# Patient Record
Sex: MALE | Race: WHITE | NOT HISPANIC OR LATINO | Employment: OTHER | URBAN - METROPOLITAN AREA
[De-identification: names, ages, dates, MRNs, and addresses within clinical notes are randomized per-mention and may not be internally consistent; named-entity substitution may affect disease eponyms.]

---

## 2021-02-15 ENCOUNTER — APPOINTMENT (OUTPATIENT)
Dept: PREADMISSION TESTING | Facility: HOSPITAL | Age: 76
End: 2021-02-15

## 2021-03-30 ENCOUNTER — OFFICE VISIT (OUTPATIENT)
Dept: CARDIOLOGY | Facility: CLINIC | Age: 76
End: 2021-03-30

## 2021-03-30 ENCOUNTER — TELEPHONE (OUTPATIENT)
Dept: CARDIOLOGY | Facility: CLINIC | Age: 76
End: 2021-03-30

## 2021-03-30 VITALS
SYSTOLIC BLOOD PRESSURE: 136 MMHG | BODY MASS INDEX: 31.35 KG/M2 | HEIGHT: 70 IN | WEIGHT: 219 LBS | HEART RATE: 69 BPM | RESPIRATION RATE: 12 BRPM | OXYGEN SATURATION: 98 % | TEMPERATURE: 97.8 F | DIASTOLIC BLOOD PRESSURE: 78 MMHG

## 2021-03-30 DIAGNOSIS — Z79.4 TYPE 2 DIABETES MELLITUS WITHOUT COMPLICATION, WITH LONG-TERM CURRENT USE OF INSULIN (HCC): ICD-10-CM

## 2021-03-30 DIAGNOSIS — E11.9 TYPE 2 DIABETES MELLITUS WITHOUT COMPLICATION, WITH LONG-TERM CURRENT USE OF INSULIN (HCC): ICD-10-CM

## 2021-03-30 DIAGNOSIS — I42.0 CARDIOMYOPATHY, DILATED (HCC): Primary | ICD-10-CM

## 2021-03-30 DIAGNOSIS — E78.5 HYPERLIPIDEMIA LDL GOAL <70: ICD-10-CM

## 2021-03-30 DIAGNOSIS — I10 ESSENTIAL HYPERTENSION: Primary | ICD-10-CM

## 2021-03-30 PROCEDURE — 99204 OFFICE O/P NEW MOD 45 MIN: CPT | Performed by: INTERNAL MEDICINE

## 2021-03-30 PROCEDURE — 93000 ELECTROCARDIOGRAM COMPLETE: CPT | Performed by: INTERNAL MEDICINE

## 2021-03-30 RX ORDER — SACUBITRIL AND VALSARTAN 97; 103 MG/1; MG/1
1 TABLET, FILM COATED ORAL 2 TIMES DAILY
Qty: 180 TABLET | Refills: 3 | Status: SHIPPED | OUTPATIENT
Start: 2021-03-30 | End: 2021-04-01 | Stop reason: SDUPTHER

## 2021-03-30 RX ORDER — HYDRALAZINE HYDROCHLORIDE 25 MG/1
25 TABLET, FILM COATED ORAL 2 TIMES DAILY
Qty: 180 TABLET | Refills: 3 | Status: SHIPPED | OUTPATIENT
Start: 2021-03-30

## 2021-03-30 RX ORDER — SACUBITRIL AND VALSARTAN 97; 103 MG/1; MG/1
1 TABLET, FILM COATED ORAL 2 TIMES DAILY
Qty: 180 TABLET | Refills: 3 | Status: SHIPPED | OUTPATIENT
Start: 2021-03-30 | End: 2021-03-30 | Stop reason: SDUPTHER

## 2021-03-30 RX ORDER — HYDRALAZINE HYDROCHLORIDE 25 MG/1
25 TABLET, FILM COATED ORAL 2 TIMES DAILY
Qty: 14 TABLET | Refills: 0 | Status: SHIPPED | OUTPATIENT
Start: 2021-03-30 | End: 2021-08-02

## 2021-03-30 NOTE — PROGRESS NOTES
[facility]  [facility]ARDIOLOGY  [address] [place] [address]  Dept: [phone]  Dept Fax: [phone]    Joe Fung  1945    New Patient Office Note    History of Present Illness:  Joe Fung is a 76 y.o. male who presents to the clinic for new patient. For cardiomyopathy- Male 76 years old who has been seen by Dr West but his insurance will not allow him to see him anymore so he came over to see us, He has heart failure and apparently cardiomyopathy, he was in [place] in 2019 and was treated with diuretics, Entresto, Toprol , and Torsemide, he is doing better, , no edema, he takes torsemide PRN< feels tired and fatigue BP is 150.80, has SOB on exertion, walking over 2 blocks, . EKG sinus rhythm with HR 63, , will increase Entresto to 97.103 bid, , keep all the others meds, we might need to add Aldactone     The following portions of the patient's history were reviewed and updated as appropriate: allergies, current medications, past family history, past medical history, past social history, past surgical history and problem list.    Medications:  Alpha-Lipoic Acid tablet  cetirizine  Cinnamon  coenzyme Q10  DULoxetine  Entresto tablet  fexofenadine  finasteride  gabapentin  insulin glargine  melatonin  meloxicam  metFORMIN  metoprolol succinate XL  montelukast  multivitamin with minerals tablet  torsemide  traZODone  vitamin B-12  Vitamin D3 capsule  vitamin E    Subjective  Allergies   Allergen Reactions   • Ace Inhibitors Cough        Past Medical History:   Diagnosis Date   • Adenomatous polyp of colon    • Bilateral exudative age-related macular degeneration (CMS/HCC)    • BMI 33.0-33.9,adult    • BPH associated with nocturia    • Bursitis of left elbow    • Chronic fatigue    • Chronic systolic heart failure (CMS/HCC)    • CKD (chronic kidney disease)    • Deficiency anemia    • Diabetic retinopathy (CMS/HCC)    • Diastolic dysfunction    • Dilated  cardiomyopathy (CMS/HCC)    • Elevated lipids    • Frequent falls    • Insomnia, unspecified    • Long term (current) use of insulin (CMS/HCC)    • Mild episode of recurrent major depressive disorder (CMS/HCC)    • Mixed hyperlipidemia    • Nocturia    • Other long term (current) drug therapy    • Perennial allergic rhinitis    • Peripheral polyneuropathy    • Primary osteoarthritis involving multiple joints    • Rotator cuff tear    • Skin cancer    • Small vessel coronary artery disease    • Type 2 diabetes mellitus with diabetic neuropathy, with long-term current use of insulin (CMS/HCC)    • Vitamin D insufficiency        Past Surgical History:   Procedure Laterality Date   • CATARACT EXTRACTION     • ELBOW OLECRANNON BURSA EXCISION     • KNEE ARTHROSCOPY Bilateral    • ROTATOR CUFF REPAIR         Family History   Problem Relation Age of Onset   • Stroke Mother    • Diabetes Mother    • Cancer Mother    • Diabetes Maternal Grandmother    • Heart disease Maternal Grandmother         Social History     Socioeconomic History   • Marital status:      Spouse name: Not on file   • Number of children: Not on file   • Years of education: Not on file   • Highest education level: Not on file   Tobacco Use   • Smoking status: Never Smoker   • Smokeless tobacco: Never Used   Substance and Sexual Activity   • Drug use: Never   • Sexual activity: Defer       Review of Systems   Constitutional: Negative.    HENT: Negative.    Respiratory: Negative.    Cardiovascular: Negative.    Endocrine: Negative.    Genitourinary: Negative.    Musculoskeletal: Negative.    Skin: Negative.    Allergic/Immunologic: Negative.    Neurological: Negative.    Hematological: Negative.    Psychiatric/Behavioral: Negative.        Cardiovascular Procedures    ECHO/MUGA:   STRESS TESTS:   CARDIAC CATH:   DEVICES:   HOLTER:   CT/MRI:   VASCULAR:   CARDIOTHORACIC:     Objective  Vitals:    03/30/21 1421   BP: 136/78   BP Location: Left arm  "  Patient Position: Sitting   Cuff Size: Adult   Pulse: 69   Resp: 12   Temp: 97.8 °F (36.6 °C)   TempSrc: Infrared   SpO2: 98%   Weight: 99.3 kg (219 lb)   Height: 177.8 cm (70\")   PainSc: 0-No pain       Physical Exam  Cardiovascular:      PMI at left midclavicular line. Normal rate. Regular rhythm. Normal S1. Normal S2.      Murmurs: There is no murmur.      No gallop. No click. No rub.   Pulses:     Intact distal pulses.   Edema:     Peripheral edema absent.          Diagnostic Data    ECG 12 Lead    Date/Time: 3/30/2021 3:12 PM  Performed by: Cade North MD  Authorized by: Cade North MD   Comparison: compared with previous ECG   Rhythm: sinus rhythm  Rate: normal  BPM: 63  QRS axis: left    Clinical impression: normal ECG            Assessment and Plan  Diagnoses and all orders for this visit:    Cardiomyopathy, dilated (CMS/HCC)-He seems to have non ischemic cardiomyopathy- no data. Has had cardiac cath at [place], will increase Entresto 97.103 bid, keep Toprol xl 50 mg     Hyperlipidemia LDL goal <70- on Atorvastatin 20 mg    Type 2 diabetes mellitus without complication, with long-term current use of insulin (CMS/HCC)    Other orders  -     sacubitril-valsartan (Entresto)  MG tablet; Take 1 tablet by mouth 2 (Two) Times a Day.        Return in about 4 months (around 7/30/2021) for Recheck.    Cade North MD  03/30/2021  "

## 2021-04-01 DIAGNOSIS — I42.0 CARDIOMYOPATHY, DILATED (HCC): ICD-10-CM

## 2021-04-01 RX ORDER — SACUBITRIL AND VALSARTAN 97; 103 MG/1; MG/1
1 TABLET, FILM COATED ORAL 2 TIMES DAILY
Qty: 180 TABLET | Refills: 3 | Status: SHIPPED | OUTPATIENT
Start: 2021-04-01 | End: 2021-05-21

## 2021-04-01 NOTE — TELEPHONE ENCOUNTER
WIFE CALLING BACK HE'S ON 2 BLOOD PRESSURE MEDS AND THEY ADDED hydrALAZINE (APRESOLINE) 25 MG tablet    HER QUESTION IS WHY IS HE TAKING 2 BLOOD PRESSURE MEDICATIONS    [phone]

## 2021-04-01 NOTE — TELEPHONE ENCOUNTER
Patient said [employer] to get his Entresto, Dr North needs to get a form from [email or web address] and send the completed form and a new prescription

## 2021-04-07 ENCOUNTER — TELEPHONE (OUTPATIENT)
Dept: CARDIOLOGY | Facility: CLINIC | Age: 76
End: 2021-04-07

## 2021-04-15 ENCOUNTER — TELEPHONE (OUTPATIENT)
Dept: CARDIOLOGY | Facility: CLINIC | Age: 76
End: 2021-04-15

## 2021-04-27 ENCOUNTER — TELEPHONE (OUTPATIENT)
Dept: CARDIOLOGY | Facility: CLINIC | Age: 76
End: 2021-04-27

## 2021-04-27 NOTE — TELEPHONE ENCOUNTER
Pt needs to fill out application and needs last taxes or last social security notice. Pt needs Pic id and issuance (a copy of it ) left message for pt

## 2021-04-27 NOTE — TELEPHONE ENCOUNTER
ENTRESTO TOO EXPENSIVE,,,,,,,, [employer] IS SUPPOSE TO BE HELPING BUT NEEDS INFO SENT OVER  FAX:  [phone]

## 2021-04-29 ENCOUNTER — TELEPHONE (OUTPATIENT)
Dept: CARDIOLOGY | Facility: CLINIC | Age: 76
End: 2021-04-29

## 2021-05-12 ENCOUNTER — TELEPHONE (OUTPATIENT)
Dept: CARDIOLOGY | Facility: CLINIC | Age: 76
End: 2021-05-12

## 2021-05-12 NOTE — TELEPHONE ENCOUNTER
Information not right need her to fill out right form and bring in tax  Social security form  information  Insurance pic id and she need to fill out right form . Left messge for pt

## 2021-05-12 NOTE — TELEPHONE ENCOUNTER
CALLING TO MAKE SURE YOU HAVE FAXED OVER MED REQUEST TO [facility] FOR ENTRESTO

## 2021-05-13 DIAGNOSIS — I42.0 CARDIOMYOPATHY, DILATED (HCC): ICD-10-CM

## 2021-05-13 RX ORDER — SACUBITRIL AND VALSARTAN 97; 103 MG/1; MG/1
1 TABLET, FILM COATED ORAL 2 TIMES DAILY
Qty: 180 TABLET | Refills: 3 | Status: CANCELLED | OUTPATIENT
Start: 2021-05-13

## 2021-05-17 ENCOUNTER — TELEPHONE (OUTPATIENT)
Dept: CARDIOLOGY | Facility: CLINIC | Age: 76
End: 2021-05-17

## 2021-05-21 DIAGNOSIS — I42.0 CARDIOMYOPATHY, DILATED (HCC): ICD-10-CM

## 2021-05-21 RX ORDER — SACUBITRIL AND VALSARTAN 97; 103 MG/1; MG/1
1 TABLET, FILM COATED ORAL 2 TIMES DAILY
Qty: 180 TABLET | Refills: 3 | Status: SHIPPED | OUTPATIENT
Start: 2021-05-21 | End: 2022-10-24

## 2021-06-02 ENCOUNTER — TELEPHONE (OUTPATIENT)
Dept: CARDIOLOGY | Facility: CLINIC | Age: 76
End: 2021-06-02

## 2021-06-02 NOTE — TELEPHONE ENCOUNTER
Pt was told that he needs to keep the same medication and to keep not  chairs that swille . Pt did fall from that . Pt asked if they could finish his 49-51 I said yes take two .

## 2021-06-02 NOTE — TELEPHONE ENCOUNTER
WIFE CALLING ASKING IF HE CAN TAKE A LOWER DOES IN MORNING AND HIGHER ONE AT NIGHT??    PLEASE CALL HER    [phone]      sacubitril-valsartan (Entresto)  MG tablet

## 2021-08-02 ENCOUNTER — OFFICE VISIT (OUTPATIENT)
Dept: CARDIOLOGY | Facility: CLINIC | Age: 76
End: 2021-08-02

## 2021-08-02 VITALS
TEMPERATURE: 99 F | SYSTOLIC BLOOD PRESSURE: 128 MMHG | OXYGEN SATURATION: 96 % | BODY MASS INDEX: 32.64 KG/M2 | DIASTOLIC BLOOD PRESSURE: 72 MMHG | HEIGHT: 70 IN | WEIGHT: 228 LBS | RESPIRATION RATE: 12 BRPM | HEART RATE: 86 BPM

## 2021-08-02 DIAGNOSIS — Z79.4 TYPE 2 DIABETES MELLITUS WITHOUT COMPLICATION, WITH LONG-TERM CURRENT USE OF INSULIN (HCC): ICD-10-CM

## 2021-08-02 DIAGNOSIS — I42.0 CARDIOMYOPATHY, DILATED (HCC): Primary | ICD-10-CM

## 2021-08-02 DIAGNOSIS — E11.9 TYPE 2 DIABETES MELLITUS WITHOUT COMPLICATION, WITH LONG-TERM CURRENT USE OF INSULIN (HCC): ICD-10-CM

## 2021-08-02 DIAGNOSIS — E78.5 HYPERLIPIDEMIA LDL GOAL <70: ICD-10-CM

## 2021-08-02 PROCEDURE — 99214 OFFICE O/P EST MOD 30 MIN: CPT | Performed by: INTERNAL MEDICINE

## 2021-08-02 NOTE — PROGRESS NOTES
[facility]  [facility] CARDIOLOGY  [address] [place] [address]  Dept: [phone]  Dept Fax: [phone]    Joe Fung  1945    Follow Up Office Visit Note    History of Present Illness:  Joe Fung is a 76 y.o. male who presents to the clinic for Follow-up. Heart failure- we do not have the records from [place] but last echo his Ef is normal ? He does not takes the water pill because makes him to go to the bathroom , he claims to have Cough and SOB specially at night time, advised to take the water pill, on Entresto 49.51 bid and Toprol xl 50 mg    The following portions of the patient's history were reviewed and updated as appropriate: allergies, current medications, past family history, past medical history, past social history, past surgical history and problem list.    Medications:  Alpha-Lipoic Acid tablet  cetirizine  Cinnamon  coenzyme Q10  DULoxetine  Entresto tablet  fexofenadine  finasteride  gabapentin  hydrALAZINE  insulin glargine  melatonin  meloxicam  metFORMIN  metoprolol succinate XL  montelukast  multivitamin with minerals tablet  torsemide  traZODone  vitamin B-12  Vitamin D3 capsule  vitamin E    Subjective  Allergies   Allergen Reactions   • Ace Inhibitors Cough        Past Medical History:   Diagnosis Date   • Adenomatous polyp of colon    • Bilateral exudative age-related macular degeneration (CMS/HCC)    • BMI 33.0-33.9,adult    • BPH associated with nocturia    • Bursitis of left elbow    • Chronic fatigue    • Chronic systolic heart failure (CMS/HCC)    • CKD (chronic kidney disease)    • Deficiency anemia    • Diabetic retinopathy (CMS/HCC)    • Diastolic dysfunction    • Dilated cardiomyopathy (CMS/HCC)    • Elevated lipids    • Frequent falls    • Insomnia, unspecified    • Long term (current) use of insulin (CMS/HCC)    • Mild episode of recurrent major depressive disorder (CMS/HCC)    • Mixed hyperlipidemia    • Nocturia    •  Other long term (current) drug therapy    • Perennial allergic rhinitis    • Peripheral polyneuropathy    • Primary osteoarthritis involving multiple joints    • Rotator cuff tear    • Skin cancer    • Small vessel coronary artery disease    • Type 2 diabetes mellitus with diabetic neuropathy, with long-term current use of insulin (CMS/[facility])    • Vitamin D insufficiency        Past Surgical History:   Procedure Laterality Date   • CATARACT EXTRACTION     • ELBOW OLECRANNON BURSA EXCISION     • KNEE ARTHROSCOPY Bilateral    • ROTATOR CUFF REPAIR         Family History   Problem Relation Age of Onset   • Stroke Mother    • Diabetes Mother    • Cancer Mother    • Diabetes Maternal Grandmother    • Heart disease Maternal Grandmother         Social History     Socioeconomic History   • Marital status:      Spouse name: Not on file   • Number of children: Not on file   • Years of education: Not on file   • Highest education level: Not on file   Tobacco Use   • Smoking status: Never Smoker   • Smokeless tobacco: Never Used   Vaping Use   • Vaping Use: Never used   Substance and Sexual Activity   • Alcohol use: Yes   • Drug use: Never   • Sexual activity: Defer       Review of Systems   Constitutional: Negative.    HENT: Negative.    Respiratory: Positive for shortness of breath.    Cardiovascular: Negative.    Endocrine: Negative.    Genitourinary: Negative.    Musculoskeletal: Negative.    Skin: Negative.    Allergic/Immunologic: Negative.    Neurological: Negative.    Hematological: Negative.    Psychiatric/Behavioral: Negative.    All other systems reviewed and are negative.      Cardiovascular Procedures    ECHO/MUGA:   STRESS TESTS:   CARDIAC CATH:   DEVICES:   HOLTER:   CT/MRI:   VASCULAR:   CARDIOTHORACIC:     Objective  Vitals:    08/02/21 1532   BP: 128/72   BP Location: Left arm   Patient Position: Sitting   Cuff Size: Adult   Pulse: 86   Resp: 12   Temp: 99 °F (37.2 °C)   TempSrc: Infrared   SpO2: 96%  "  Weight: 103 kg (228 lb)   Height: 177.8 cm (70\")   PainSc: 0-No pain     Body mass index is 32.71 kg/m².     Physical Exam  Vitals reviewed.   Constitutional:       Appearance: Healthy appearance. Not in distress.   Eyes:      Pupils: Pupils are equal, round, and reactive to light.   HENT:    Mouth/Throat:      Pharynx: Oropharynx is clear.   Neck:      Thyroid: Thyroid normal.      Vascular: No JVR. JVD normal.   Pulmonary:      Effort: Pulmonary effort is normal.      Breath sounds: Normal breath sounds. No wheezing. No rhonchi. No rales.   Chest:      Chest wall: Not tender to palpatation.   Cardiovascular:      PMI at left midclavicular line. Normal rate. Regular rhythm. Normal S1. Normal S2.      Murmurs: There is no murmur.      No gallop. No click. No rub.   Pulses:     Intact distal pulses.   Edema:     Thigh: bilateral 2+ edema of the thigh.     Pretibial: bilateral 2+ edema of the pretibial area.     Ankle: bilateral 2+ edema of the ankle.     Feet: bilateral 2+ edema of the feet.  Abdominal:      General: Bowel sounds are normal.      Palpations: Abdomen is soft.      Tenderness: There is no abdominal tenderness.   Musculoskeletal: Normal range of motion.         General: No tenderness.      Cervical back: Normal range of motion and neck supple. Skin:     General: Skin is warm and dry.   Neurological:      General: No focal deficit present.      Mental Status: Alert and oriented to person, place and time.          Diagnostic Data  Procedures    Assessment and Plan  Diagnoses and all orders for this visit:    Cardiomyopathy, dilated (CMS/HCC)-apparently was low in [place] no records, last echo normal, on Entresto and Toprol plus torsemide 10 mg, advised to take the water pill daily, he has some cough and SOB when he lye down at the recliner     Hyperlipidemia LDL goal <70- on Lipitor 20 mg    Type 2 diabetes mellitus without complication, with long-term current use of insulin (CMS/HCC)         Return " in about 6 months (around 2/2/2022) for Recheck.    Cade North MD  08/02/2021

## 2022-03-30 ENCOUNTER — TELEPHONE (OUTPATIENT)
Dept: FAMILY MEDICINE CLINIC | Facility: CLINIC | Age: 77
End: 2022-03-30

## 2022-04-07 ENCOUNTER — TELEPHONE (OUTPATIENT)
Dept: FAMILY MEDICINE CLINIC | Facility: CLINIC | Age: 77
End: 2022-04-07

## 2022-04-07 NOTE — TELEPHONE ENCOUNTER
Referrral to Hematology/oncology, hematologists are experts in persistent anemia, so we need to refer him to one. See where they would prefer to go..[facility]

## 2022-04-07 NOTE — TELEPHONE ENCOUNTER
Patients wife called, stated that Dr Irene referred patient to Gastro, and all the testing came back normal. Yet his counts are still low and patient is always tired. They were asking on what their next steps would be. Please advise.

## 2022-04-08 DIAGNOSIS — D64.9 ANEMIA, UNSPECIFIED TYPE: Primary | ICD-10-CM

## 2022-04-21 ENCOUNTER — TELEPHONE (OUTPATIENT)
Dept: FAMILY MEDICINE CLINIC | Facility: CLINIC | Age: 77
End: 2022-04-21

## 2022-05-17 ENCOUNTER — TELEPHONE (OUTPATIENT)
Dept: FAMILY MEDICINE CLINIC | Facility: CLINIC | Age: 77
End: 2022-05-17

## 2022-05-27 RX ORDER — METOPROLOL SUCCINATE 50 MG/1
TABLET, EXTENDED RELEASE ORAL
Qty: 90 TABLET | Refills: 0 | Status: SHIPPED | OUTPATIENT
Start: 2022-05-27 | End: 2022-06-23

## 2022-05-27 RX ORDER — TORSEMIDE 10 MG/1
TABLET ORAL
Qty: 90 TABLET | Refills: 0 | Status: SHIPPED | OUTPATIENT
Start: 2022-05-27 | End: 2022-05-31

## 2022-05-27 RX ORDER — VENLAFAXINE HYDROCHLORIDE 37.5 MG/1
CAPSULE, EXTENDED RELEASE ORAL
Qty: 90 CAPSULE | Refills: 0 | Status: SHIPPED | OUTPATIENT
Start: 2022-05-27 | End: 2022-08-18 | Stop reason: SDUPTHER

## 2022-05-31 ENCOUNTER — CONSULT (OUTPATIENT)
Dept: ONCOLOGY | Facility: CLINIC | Age: 77
End: 2022-05-31

## 2022-05-31 VITALS
TEMPERATURE: 98.2 F | RESPIRATION RATE: 18 BRPM | HEART RATE: 80 BPM | BODY MASS INDEX: 28.77 KG/M2 | OXYGEN SATURATION: 98 % | HEIGHT: 70 IN | SYSTOLIC BLOOD PRESSURE: 135 MMHG | WEIGHT: 201 LBS | DIASTOLIC BLOOD PRESSURE: 64 MMHG

## 2022-05-31 DIAGNOSIS — D64.9 NORMOCHROMIC NORMOCYTIC ANEMIA: Chronic | ICD-10-CM

## 2022-05-31 DIAGNOSIS — D64.9 NORMOCHROMIC NORMOCYTIC ANEMIA: Primary | Chronic | ICD-10-CM

## 2022-05-31 PROCEDURE — 99204 OFFICE O/P NEW MOD 45 MIN: CPT | Performed by: INTERNAL MEDICINE

## 2022-05-31 RX ORDER — DAPAGLIFLOZIN 5 MG/1
TABLET, FILM COATED ORAL
COMMUNITY
End: 2022-12-05 | Stop reason: SDUPTHER

## 2022-05-31 RX ORDER — BUMETANIDE 2 MG/1
TABLET ORAL
COMMUNITY

## 2022-05-31 RX ORDER — PANTOPRAZOLE SODIUM 40 MG/1
TABLET, DELAYED RELEASE ORAL
COMMUNITY
End: 2022-11-29

## 2022-05-31 RX ORDER — ATORVASTATIN CALCIUM 20 MG/1
TABLET, FILM COATED ORAL
COMMUNITY
Start: 2022-05-17 | End: 2022-10-24

## 2022-05-31 NOTE — PROGRESS NOTES
"CHIEF COMPLAINT: Fatigue disproportional to anemia    REASON FOR REFERRAL: Same      RECORDS OBTAINED  Records of the patients history including those obtained from primary care were reviewed and summarized in detail.    Oncology/Hematology History Overview Note   1.  Normochromic normocytic anemia  2.  History of adenoma  3.  Diastolic dysfunction   4.  Diabetes  5.  Hyperlipidemia  6.  Polyneuropathy  7.  Macular degeneration  8.  Chronic kidney disease  9.  Diarrhea felt to be due to Bumex  10.  Osteoarthritis    Hematology history timeline:  -2019 colonoscopy with polyps.  Next 1 due 2024.  -November 2021 hemoglobin 12.4 with normal ferritin, iron, B12, folate Per primary care note.  -2/10/2022 iron 82 ferritin 67.5.  Folic acid greater than 22.5.  Hemoglobin 11.9 with MCV 97 and normal white count platelet count and differential.      -5/31/2022 [facility] initial consultation: Here because of his \"anemia\".  He is barely anemic with normochromic normocytic indices which would make iron deficiency or folate deficiency or B12 deficiency unlikely and none of the serologies suggest this.  His wife and he are both convinced he has had an EGD and a colonoscopy done within the last year with Dr. Jerez and I will try to get those results but in the meantime before putting him through an M panel, hemolytic work-up, megaloblastic work-up, and certainly before do anything more aggressive like a bone marrow biopsy to look for myelodysplasia, I will simply repeat his blood work now and again in 4 to 5 months to see what the trend is.  If it is normalizing or stable he is not sure how much work-up he did want to do.  The odds of myelodysplasia or some more sinister marrow disorder with normochromic normocytic indices is unlikely.  With a combination of mild chronic kidney disease and polyneuropathy, if his anemia worsens we will also check the M panel for possible monoclonal gammopathy as a hint towards plasma " cell dyscrasia.           HISTORY OF PRESENT ILLNESS:  The patient is a 77 y.o.  male, referred for fatigue without jaundice or icterus or change in the color caliber or consistency of stools.  Patient's wife states that he has had EGD and colonoscopy with Dr. Jerez done within the last year and he recalls that as well though I do not have those reports.  Note from Dr. Irene states that that was in 2019.    REVIEW OF SYSTEMS:  No jaundice or icterus.  No rash.  No rachel synovitis.  Chronic peripheral polyneuropathy.    Past Medical History:   Diagnosis Date   • Adenomatous polyp of colon    • Bilateral exudative age-related macular degeneration (HCC)    • BMI 33.0-33.9,adult    • BPH associated with nocturia    • Bursitis of left elbow    • Chronic fatigue    • Chronic systolic heart failure (HCC)    • CKD (chronic kidney disease)    • Diabetic retinopathy (HCC)    • Diastolic dysfunction    • Dilated cardiomyopathy (HCC)    • Elevated lipids    • Frequent falls    • Insomnia, unspecified    • Long term (current) use of insulin (HCC)    • Mild episode of recurrent major depressive disorder (HCC)    • Mixed hyperlipidemia    • Nocturia    • Other long term (current) drug therapy    • Perennial allergic rhinitis    • Peripheral polyneuropathy    • Primary osteoarthritis involving multiple joints    • Rotator cuff tear    • Skin cancer    • Small vessel coronary artery disease    • Type 2 diabetes mellitus with diabetic neuropathy, with long-term current use of insulin (HCC)    • Vitamin D insufficiency      Past Surgical History:   Procedure Laterality Date   • CATARACT EXTRACTION     • ELBOW OLECRANNON BURSA EXCISION     • KNEE ARTHROSCOPY Bilateral    • ROTATOR CUFF REPAIR         Current Outpatient Medications on File Prior to Visit   Medication Sig Dispense Refill   • Alpha-Lipoic Acid 600 MG tablet Take  by mouth.     • atorvastatin (LIPITOR) 20 MG tablet      • bumetanide (BUMEX) 2 MG tablet bumetanide 2 mg  tablet   Take 1 tablet every day by oral route in the morning.     • cetirizine (zyrTEC) 10 MG tablet Take 10 mg by mouth Daily.     • Cholecalciferol (Vitamin D3) 25 MCG (1000 UT) capsule Take  by mouth.     • Cinnamon 500 MG capsule Take  by mouth.     • coenzyme Q10 100 MG capsule Take  by mouth.     • dapagliflozin (Farxiga) 5 MG tablet tablet Farxiga 5 mg tablet   Take 1 tablet every day by oral route.     • fexofenadine (ALLEGRA) 180 MG tablet Take 180 mg by mouth Daily.     • finasteride (PROSCAR) 5 MG tablet Take 5 mg by mouth Daily.     • gabapentin (NEURONTIN) 100 MG capsule Take 100 mg by mouth 2 (Two) Times a Day As Needed.     • hydrALAZINE (APRESOLINE) 25 MG tablet Take 1 tablet by mouth 2 (two) times a day. 180 tablet 3   • insulin glargine (LANTUS, SEMGLEE) 100 UNIT/ML injection Inject 40 Units under the skin into the appropriate area as directed 2 (Two) Times a Day. QAM QPM     • melatonin 1 MG tablet Take 1 mg by mouth.     • meloxicam (MOBIC) 7.5 MG tablet Take  by mouth Daily As Needed. 1-2 TABLETS WITH FOOD     • metFORMIN (GLUCOPHAGE) 500 MG tablet Take 500 mg by mouth 2 (Two) Times a Day With Meals.     • metoprolol succinate XL (TOPROL-XL) 50 MG 24 hr tablet TAKE 1 TABLET DAILY 90 tablet 0   • Mirabegron ER (Myrbetriq) 50 MG tablet sustained-release 24 hour 24 hr tablet Myrbetriq 50 mg tablet,extended release   Take 1 tablet every day by oral route in the morning.     • montelukast (SINGULAIR) 10 MG tablet Take 10 mg by mouth Every Night.     • multivitamin with minerals (Multivitamin Adults) tablet tablet Take 1 tablet by mouth Daily. WITH FOOD     • pantoprazole (PROTONIX) 40 MG EC tablet pantoprazole 40 mg tablet,delayed release   Take 1 tablet every day by oral route.     • sacubitril-valsartan (Entresto)  MG tablet Take 1 tablet by mouth 2 (Two) Times a Day. 180 tablet 3   • traZODone (DESYREL) 50 MG tablet Take  by mouth At Night As Needed for Sleep. 1/2 - 1 TABLET     •  "venlafaxine XR (EFFEXOR-XR) 37.5 MG 24 hr capsule TAKE 1 CAPSULE DAILY WITH  FOOD FOR DEPRESSION AND    CHRONIC BACK/JOINT PAIN. 90 capsule 0   • vitamin B-12 (CYANOCOBALAMIN) 1000 MCG tablet Take 1,000 mcg by mouth Daily.     • vitamin E 100 UNIT capsule Take  by mouth.     • [DISCONTINUED] torsemide (DEMADEX) 10 MG tablet TAKE 1 TABLET DAILY AS     NEEDED 90 tablet 0     No current facility-administered medications on file prior to visit.       Allergies   Allergen Reactions   • Ace Inhibitors Cough       Social History     Socioeconomic History   • Marital status:    Tobacco Use   • Smoking status: Never Smoker   • Smokeless tobacco: Never Used   Vaping Use   • Vaping Use: Never used   Substance and Sexual Activity   • Alcohol use: Yes   • Drug use: Never   • Sexual activity: Defer       Family History   Problem Relation Age of Onset   • Stroke Mother    • Diabetes Mother    • Cancer Mother    • Diabetes Maternal Grandmother    • Heart disease Maternal Grandmother        PHYSICAL EXAM:  No jaundice or icterus.  No respiratory distress.  No rashes.    /64   Pulse 80   Temp 98.2 °F (36.8 °C)   Resp 18   Ht 177.8 cm (70\")   Wt 91.2 kg (201 lb)   SpO2 98%   BMI 28.84 kg/m²     ECOG score: 2           ECOG: (2) Ambulatory & Capable of Self Care, Unable to Carry Out Work Activity, Up & About Greater Than 50% of Waking Hours    No results found for: HGB, HCT, MCV, PLT, WBC, NEUTROABS, LYMPHSABS, MONOSABS, EOSABS, BASOSABS  No results found for: GLUCOSE, BUN, CREATININE, NA, K, CL, CO2, CALCIUM, PROTEINTOT, ALBUMIN, BILITOT, ALKPHOS, AST, ALT      Assessment & Plan     1.  Normochromic normocytic anemia  2.  History of adenoma  3.  Diastolic dysfunction   4.  Diabetes  5.  Hyperlipidemia  6.  Polyneuropathy  7.  Macular degeneration  8.  Chronic kidney disease  9.  Diarrhea felt to be due to Bumex  10.  Osteoarthritis    Hematology history timeline:  -2019 colonoscopy with polyps.  Next 1 due " "2024.  -November 2021 hemoglobin 12.4 with normal ferritin, iron, B12, folate Per primary care note.  -2/10/2022 iron 82 ferritin 67.5.  Folic acid greater than 22.5.  Hemoglobin 11.9 with MCV 97 and normal white count platelet count and differential.    -5/31/2022 [facility] initial consultation: Here because of his \"anemia\".  He is barely anemic with normochromic normocytic indices which would make iron deficiency or folate deficiency or B12 deficiency unlikely and none of the serologies suggest this.  His wife and he are both convinced he has had an EGD and a colonoscopy done within the last year with Dr. Jerez and I will try to get those results but in the meantime before putting him through an M panel, hemolytic work-up, megaloblastic work-up, and certainly before do anything more aggressive like a bone marrow biopsy to look for myelodysplasia, I will simply repeat his blood work now and again in 4 to 5 months to see what the trend is.  If it is normalizing or stable he is not sure how much work-up he did want to do.  The odds of myelodysplasia or some more sinister marrow disorder with normochromic normocytic indices is unlikely.  With a combination of mild chronic kidney disease and polyneuropathy, if his anemia worsens we will also check the M panel for possible monoclonal gammopathy as a hint towards plasma cell dyscrasia.    Total time of care today inclusive of time spent today prior to his visit collecting records and cataloging results of those records as outlined above from primary care and during visit translating that information and investigating other potential signs or symptoms relative to potential causes of anemia during his visit and after visit instituting the plan as outlined above took 45 minutes of total patient care time throughout the day today.    Afshin Das MD    5/31/2022  "

## 2022-06-01 LAB
BASOPHILS # BLD AUTO: 0.1 X10E3/UL (ref 0–0.2)
BASOPHILS NFR BLD AUTO: 1 %
EOSINOPHIL # BLD AUTO: 0.5 X10E3/UL (ref 0–0.4)
EOSINOPHIL NFR BLD AUTO: 5 %
ERYTHROCYTE [DISTWIDTH] IN BLOOD BY AUTOMATED COUNT: 14.6 % (ref 11.6–15.4)
HCT VFR BLD AUTO: 34 % (ref 37.5–51)
HGB BLD-MCNC: 11.5 G/DL (ref 13–17.7)
IMM GRANULOCYTES # BLD AUTO: 0.1 X10E3/UL (ref 0–0.1)
IMM GRANULOCYTES NFR BLD AUTO: 1 %
LYMPHOCYTES # BLD AUTO: 3.9 X10E3/UL (ref 0.7–3.1)
LYMPHOCYTES NFR BLD AUTO: 43 %
MCH RBC QN AUTO: 32.6 PG (ref 26.6–33)
MCHC RBC AUTO-ENTMCNC: 33.8 G/DL (ref 31.5–35.7)
MCV RBC AUTO: 96 FL (ref 79–97)
MONOCYTES # BLD AUTO: 0.5 X10E3/UL (ref 0.1–0.9)
MONOCYTES NFR BLD AUTO: 6 %
NEUTROPHILS # BLD AUTO: 4.1 X10E3/UL (ref 1.4–7)
NEUTROPHILS NFR BLD AUTO: 44 %
PLATELET # BLD AUTO: 232 X10E3/UL (ref 150–450)
RBC # BLD AUTO: 3.53 X10E6/UL (ref 4.14–5.8)
WBC # BLD AUTO: 9.2 X10E3/UL (ref 3.4–10.8)

## 2022-06-07 ENCOUNTER — TELEPHONE (OUTPATIENT)
Dept: ONCOLOGY | Facility: CLINIC | Age: 77
End: 2022-06-07

## 2022-06-07 NOTE — TELEPHONE ENCOUNTER
Called patients wife and left message for her to return call. Patients Hgb and MCV on CBC from 5/31/22 are stable.

## 2022-06-07 NOTE — TELEPHONE ENCOUNTER
Caller: KYREE RAMIREZ    Relationship: Emergency Contact    Best call back number: [phone]    What test was performed: LABS    When was the test performed: 05/31    Where was the test performed:

## 2022-06-23 ENCOUNTER — OFFICE VISIT (OUTPATIENT)
Dept: FAMILY MEDICINE CLINIC | Facility: CLINIC | Age: 77
End: 2022-06-23

## 2022-06-23 VITALS
DIASTOLIC BLOOD PRESSURE: 76 MMHG | BODY MASS INDEX: 30.35 KG/M2 | HEIGHT: 70 IN | SYSTOLIC BLOOD PRESSURE: 118 MMHG | OXYGEN SATURATION: 95 % | HEART RATE: 88 BPM | WEIGHT: 212 LBS

## 2022-06-23 DIAGNOSIS — E78.2 MIXED HYPERLIPIDEMIA: ICD-10-CM

## 2022-06-23 DIAGNOSIS — M19.042 PRIMARY OSTEOARTHRITIS OF BOTH HANDS: ICD-10-CM

## 2022-06-23 DIAGNOSIS — I42.0 CARDIOMYOPATHY, DILATED: ICD-10-CM

## 2022-06-23 DIAGNOSIS — N32.81 OVERACTIVE BLADDER: ICD-10-CM

## 2022-06-23 DIAGNOSIS — E78.5 HYPERLIPIDEMIA LDL GOAL <70: ICD-10-CM

## 2022-06-23 DIAGNOSIS — E11.621 DIABETIC ULCER OF TOE OF RIGHT FOOT ASSOCIATED WITH TYPE 2 DIABETES MELLITUS, LIMITED TO BREAKDOWN OF SKIN: ICD-10-CM

## 2022-06-23 DIAGNOSIS — E11.22 TYPE 2 DIABETES MELLITUS WITH STAGE 3B CHRONIC KIDNEY DISEASE, WITH LONG-TERM CURRENT USE OF INSULIN: Primary | ICD-10-CM

## 2022-06-23 DIAGNOSIS — M19.041 PRIMARY OSTEOARTHRITIS OF BOTH HANDS: ICD-10-CM

## 2022-06-23 DIAGNOSIS — E11.42 TYPE 2 DIABETES MELLITUS WITH PERIPHERAL NEUROPATHY: ICD-10-CM

## 2022-06-23 DIAGNOSIS — Z79.4 TYPE 2 DIABETES MELLITUS WITH STAGE 3B CHRONIC KIDNEY DISEASE, WITH LONG-TERM CURRENT USE OF INSULIN: Primary | ICD-10-CM

## 2022-06-23 DIAGNOSIS — N39.41 URGE INCONTINENCE OF URINE: ICD-10-CM

## 2022-06-23 DIAGNOSIS — R53.83 FATIGUE, UNSPECIFIED TYPE: ICD-10-CM

## 2022-06-23 DIAGNOSIS — D64.9 NORMOCHROMIC NORMOCYTIC ANEMIA: Chronic | ICD-10-CM

## 2022-06-23 DIAGNOSIS — N40.1 LOWER URINARY TRACT SYMPTOMS DUE TO BENIGN PROSTATIC HYPERPLASIA: ICD-10-CM

## 2022-06-23 DIAGNOSIS — R35.1 NOCTURIA: ICD-10-CM

## 2022-06-23 DIAGNOSIS — I50.22 CHRONIC SYSTOLIC HEART FAILURE: ICD-10-CM

## 2022-06-23 DIAGNOSIS — Z79.899 ENCOUNTER FOR LONG-TERM (CURRENT) USE OF OTHER MEDICATIONS: ICD-10-CM

## 2022-06-23 DIAGNOSIS — D53.9 DEFICIENCY ANEMIA: ICD-10-CM

## 2022-06-23 DIAGNOSIS — N18.32 TYPE 2 DIABETES MELLITUS WITH STAGE 3B CHRONIC KIDNEY DISEASE, WITH LONG-TERM CURRENT USE OF INSULIN: Primary | ICD-10-CM

## 2022-06-23 DIAGNOSIS — L97.511 DIABETIC ULCER OF TOE OF RIGHT FOOT ASSOCIATED WITH TYPE 2 DIABETES MELLITUS, LIMITED TO BREAKDOWN OF SKIN: ICD-10-CM

## 2022-06-23 PROBLEM — I50.20 SYSTOLIC HEART FAILURE: Status: ACTIVE | Noted: 2018-03-01

## 2022-06-23 PROCEDURE — 1160F RVW MEDS BY RX/DR IN RCRD: CPT | Performed by: FAMILY MEDICINE

## 2022-06-23 PROCEDURE — G0438 PPPS, INITIAL VISIT: HCPCS | Performed by: FAMILY MEDICINE

## 2022-06-23 PROCEDURE — 36415 COLL VENOUS BLD VENIPUNCTURE: CPT | Performed by: FAMILY MEDICINE

## 2022-06-23 PROCEDURE — 1170F FXNL STATUS ASSESSED: CPT | Performed by: FAMILY MEDICINE

## 2022-06-23 RX ORDER — ASPIRIN 81 MG/1
81 TABLET, CHEWABLE ORAL DAILY
COMMUNITY

## 2022-06-24 ENCOUNTER — TELEPHONE (OUTPATIENT)
Dept: FAMILY MEDICINE CLINIC | Facility: CLINIC | Age: 77
End: 2022-06-24

## 2022-06-24 NOTE — TELEPHONE ENCOUNTER
I SENT THE REFERRAL FOR HIM TO [facility].  THEY HAVE CONTACTED HIM AND HE TOLD THEM HE WILL ONLY COME TO AN APPOINTMENT THAT IS 4:00PM OR AFTER.  THEY DO NOT SCHEDULE THIS LATE.  [facility] STATED HE SAID HIS WIFE CAN'T GET OFF WORK TO TAKE HIM.   THEY TOLD THE PATIENT TO JUST CALL THEM BACK IF HE CHANGED HIS MIND.

## 2022-06-25 LAB
ALBUMIN SERPL-MCNC: 4.3 G/DL (ref 3.7–4.7)
ALBUMIN/GLOB SERPL: 1.9 {RATIO} (ref 1.2–2.2)
ALP SERPL-CCNC: 101 IU/L (ref 44–121)
ALT SERPL-CCNC: 19 IU/L (ref 0–44)
AST SERPL-CCNC: 29 IU/L (ref 0–40)
BASOPHILS # BLD AUTO: 0.1 X10E3/UL (ref 0–0.2)
BASOPHILS NFR BLD AUTO: 1 %
BILIRUB SERPL-MCNC: 1.4 MG/DL (ref 0–1.2)
BUN SERPL-MCNC: 35 MG/DL (ref 8–27)
BUN/CREAT SERPL: 30 (ref 10–24)
CALCIUM SERPL-MCNC: 9 MG/DL (ref 8.6–10.2)
CHLORIDE SERPL-SCNC: 102 MMOL/L (ref 96–106)
CHOLEST SERPL-MCNC: 100 MG/DL (ref 100–199)
CO2 SERPL-SCNC: 26 MMOL/L (ref 20–29)
CREAT SERPL-MCNC: 1.15 MG/DL (ref 0.76–1.27)
EGFRCR SERPLBLD CKD-EPI 2021: 66 ML/MIN/1.73
EOSINOPHIL # BLD AUTO: 0.3 X10E3/UL (ref 0–0.4)
EOSINOPHIL NFR BLD AUTO: 3 %
ERYTHROCYTE [DISTWIDTH] IN BLOOD BY AUTOMATED COUNT: 13.6 % (ref 11.6–15.4)
FERRITIN SERPL-MCNC: 87 NG/ML (ref 30–400)
FOLATE SERPL-MCNC: >20 NG/ML
GLOBULIN SER CALC-MCNC: 2.3 G/DL (ref 1.5–4.5)
GLUCOSE SERPL-MCNC: 113 MG/DL (ref 65–99)
HBA1C MFR BLD: 5.3 % (ref 4.8–5.6)
HCT VFR BLD AUTO: 38.1 % (ref 37.5–51)
HDLC SERPL-MCNC: 34 MG/DL
HGB BLD-MCNC: 13 G/DL (ref 13–17.7)
IMM GRANULOCYTES # BLD AUTO: 0 X10E3/UL (ref 0–0.1)
IMM GRANULOCYTES NFR BLD AUTO: 0 %
IRON SERPL-MCNC: 73 UG/DL (ref 38–169)
LDLC SERPL CALC-MCNC: 44 MG/DL (ref 0–99)
LYMPHOCYTES # BLD AUTO: 4.3 X10E3/UL (ref 0.7–3.1)
LYMPHOCYTES NFR BLD AUTO: 43 %
MCH RBC QN AUTO: 33.2 PG (ref 26.6–33)
MCHC RBC AUTO-ENTMCNC: 34.1 G/DL (ref 31.5–35.7)
MCV RBC AUTO: 97 FL (ref 79–97)
MONOCYTES # BLD AUTO: 0.6 X10E3/UL (ref 0.1–0.9)
MONOCYTES NFR BLD AUTO: 6 %
NEUTROPHILS # BLD AUTO: 4.7 X10E3/UL (ref 1.4–7)
NEUTROPHILS NFR BLD AUTO: 47 %
PLATELET # BLD AUTO: 201 X10E3/UL (ref 150–450)
POTASSIUM SERPL-SCNC: 4.7 MMOL/L (ref 3.5–5.2)
PROT SERPL-MCNC: 6.6 G/DL (ref 6–8.5)
RBC # BLD AUTO: 3.92 X10E6/UL (ref 4.14–5.8)
SODIUM SERPL-SCNC: 141 MMOL/L (ref 134–144)
TRIGL SERPL-MCNC: 119 MG/DL (ref 0–149)
TSH SERPL DL<=0.005 MIU/L-ACNC: 2.5 UIU/ML (ref 0.45–4.5)
VIT B12 SERPL-MCNC: >2000 PG/ML (ref 232–1245)
VLDLC SERPL CALC-MCNC: 22 MG/DL (ref 5–40)
WBC # BLD AUTO: 10 X10E3/UL (ref 3.4–10.8)

## 2022-06-26 ENCOUNTER — TELEPHONE (OUTPATIENT)
Dept: FAMILY MEDICINE CLINIC | Facility: CLINIC | Age: 77
End: 2022-06-26

## 2022-06-26 NOTE — PROGRESS NOTES
The ABCs of the Annual Wellness Visit  Initial Medicare Wellness Visit    Chief Complaint   Patient presents with   • Annual Exam     Medicare wellness check up      Subjective   History of Present Illness:  Joe Fung is a 77 y.o. male who presents for an Initial Medicare Wellness Visit.    The following portions of the patient's history were reviewed and   updated as appropriate: .     Compared to one year ago, the patient feels his physical   health is better.    Compared to one year ago, the patient feels his mental   health is better.    Recent Hospitalizations:  He was not admitted to the hospital during the last year.       Current Medical Providers:  Patient Care Team:  Vimal Irene MD as PCP - General (Family Medicine)  Cade North MD as Consulting Physician (Cardiology)    Outpatient Medications Prior to Visit   Medication Sig Dispense Refill   • Alpha-Lipoic Acid 600 MG tablet Take  by mouth.     • aspirin 81 MG chewable tablet Chew 81 mg Daily.     • atorvastatin (LIPITOR) 20 MG tablet      • bumetanide (BUMEX) 2 MG tablet bumetanide 2 mg tablet   Take 1 tablet every day by oral route in the morning.     • cetirizine (zyrTEC) 10 MG tablet Take 10 mg by mouth Daily.     • Cholecalciferol (Vitamin D3) 25 MCG (1000 UT) capsule Take  by mouth.     • Cinnamon 500 MG capsule Take  by mouth.     • coenzyme Q10 100 MG capsule Take  by mouth.     • dapagliflozin (Farxiga) 5 MG tablet tablet Farxiga 5 mg tablet   Take 1 tablet every day by oral route.     • finasteride (PROSCAR) 5 MG tablet Take 5 mg by mouth Daily.     • gabapentin (NEURONTIN) 100 MG capsule Take 100 mg by mouth 2 (Two) Times a Day As Needed.     • hydrALAZINE (APRESOLINE) 25 MG tablet Take 1 tablet by mouth 2 (two) times a day. 180 tablet 3   • insulin glargine (LANTUS, SEMGLEE) 100 UNIT/ML injection Inject 40 Units under the skin into the appropriate area as directed 2 (Two) Times a Day. QAM QPM     • meloxicam (MOBIC) 7.5 MG  tablet Take  by mouth Daily As Needed. 1-2 TABLETS WITH FOOD     • metFORMIN (GLUCOPHAGE) 500 MG tablet Take 500 mg by mouth 2 (Two) Times a Day With Meals.     • Mirabegron ER (MYRBETRIQ) 50 MG tablet sustained-release 24 hour 24 hr tablet Myrbetriq 50 mg tablet,extended release   Take 1 tablet every day by oral route in the morning.     • montelukast (SINGULAIR) 10 MG tablet Take 10 mg by mouth Every Night.     • multivitamin with minerals tablet tablet Take 1 tablet by mouth Daily. WITH FOOD     • pantoprazole (PROTONIX) 40 MG EC tablet pantoprazole 40 mg tablet,delayed release   Take 1 tablet every day by oral route.     • sacubitril-valsartan (Entresto)  MG tablet Take 1 tablet by mouth 2 (Two) Times a Day. 180 tablet 3   • venlafaxine XR (EFFEXOR-XR) 37.5 MG 24 hr capsule TAKE 1 CAPSULE DAILY WITH  FOOD FOR DEPRESSION AND    CHRONIC BACK/JOINT PAIN. 90 capsule 0   • vitamin B-12 (CYANOCOBALAMIN) 1000 MCG tablet Take 1,000 mcg by mouth Daily.     • vitamin E 100 UNIT capsule Take  by mouth.     • traZODone (DESYREL) 50 MG tablet Take  by mouth At Night As Needed for Sleep. 1/2 - 1 TABLET     • fexofenadine (ALLEGRA) 180 MG tablet Take 180 mg by mouth Daily.     • melatonin 1 MG tablet Take 1 mg by mouth.     • metoprolol succinate XL (TOPROL-XL) 50 MG 24 hr tablet TAKE 1 TABLET DAILY 90 tablet 0     No facility-administered medications prior to visit.       No opioid medication identified on active medication list. I have reviewed chart for other potential  high risk medication/s and harmful drug interactions in the elderly.          Aspirin is on active medication list.       Patient Active Problem List   Diagnosis   • Cardiomyopathy, dilated (HCC)   • Hyperlipidemia LDL goal <70   • Type 2 diabetes mellitus with peripheral neuropathy (HCC)   • Normochromic normocytic anemia   • Hyperlipidemia   • Urge incontinence of urine   • Nocturia   • Lower urinary tract symptoms due to benign prostatic hyperplasia  "  • Systolic heart failure (HCC)   • Encounter for long-term (current) use of other medications   • Deficiency anemia   • Overactive bladder   • Primary osteoarthritis of both hands   • Diabetic ulcer of toe of right foot associated with type 2 diabetes mellitus, limited to breakdown of skin (HCC)     Advance Care Planning  Advance Directive is on file.  ACP discussion was declined by the patient. Patient has an advance directive in EMR which is still valid.           Objective       Vitals:    06/23/22 1457   BP: 118/76   BP Location: Left arm   Patient Position: Sitting   Cuff Size: Large Adult   Pulse: 88   SpO2: 95%   Weight: 96.2 kg (212 lb)   Height: 177.8 cm (70\")     Estimated body mass index is 30.42 kg/m² as calculated from the following:    Height as of this encounter: 177.8 cm (70\").    Weight as of this encounter: 96.2 kg (212 lb).    BMI is >= 30 and <35. (Class 1 Obesity). The following options were offered after discussion;: exercise counseling/recommendations and nutrition counseling/recommendations      Does the patient have evidence of cognitive impairment? No    Physical Exam  Lab Results   Component Value Date    CHLPL 100 06/23/2022    TRIG 119 06/23/2022    HDL 34 (L) 06/23/2022    LDL 44 06/23/2022    VLDL 22 06/23/2022    HGBA1C 5.3 06/23/2022          HEALTH RISK ASSESSMENT    Smoking Status:  Social History     Tobacco Use   Smoking Status Never Smoker   Smokeless Tobacco Never Used     Alcohol Consumption:  Social History     Substance and Sexual Activity   Alcohol Use Yes     Fall Risk Screen:    JOSEADI Fall Risk Assessment was completed, and patient is at HIGH risk for falls. Assessment completed on:6/23/2022    Depression Screen:   PHQ-2/PHQ-9 Depression Screening 6/23/2022   Little Interest or Pleasure in Doing Things 0-->not at all   Feeling Down, Depressed or Hopeless 3-->nearly every day   Trouble Falling or Staying Asleep, or Sleeping Too Much 3-->nearly every day   Feeling Tired " or Having Little Energy 3-->nearly every day   Poor Appetite or Overeating 3-->nearly every day   Feeling Bad about Yourself - or that You are a Failure or Have Let Yourself or Your Family Down 3-->nearly every day   Trouble Concentrating on Things, Such as Reading the Newspaper or Watching Television 0-->not at all   Moving or Speaking So Slowly that Other People Could Have Noticed? Or the Opposite - Being So Fidgety 0-->not at all   Thoughts that You Would be Better Off Dead or of Hurting Yourself in Some Way 1-->several days   PHQ-9: Brief Depression Severity Measure Score 16   If You Checked Off Any Problems, How Difficult Have These Problems Made It For You to Do Your Work, Take Care of Things at Home, or Get Along with Other People? somewhat difficult       Health Habits and Functional and Cognitive Screening:  Functional & Cognitive Status 6/23/2022   Do you have difficulty preparing food and eating? No   Do you have difficulty bathing yourself, getting dressed or grooming yourself? No   Do you have difficulty using the toilet? No   Do you have difficulty moving around from place to place? Yes   Do you have trouble with steps or getting out of a bed or a chair? Yes   Current Diet Well Balanced Diet   Dental Exam Up to date   Eye Exam Up to date   Exercise (times per week) 0 times per week   Current Exercises Include Walking   Do you need help using the phone?  No   Are you deaf or do you have serious difficulty hearing?  Yes   Do you need help with transportation? Yes   Do you need help shopping? No   Do you need help preparing meals?  No   Do you need help with housework?  No   Do you need help with laundry? No   Do you need help taking your medications? Yes   Do you need help managing money? No   Do you ever drive or ride in a car without wearing a seat belt? No   Have you felt unusual stress, anger or loneliness in the last month? Yes   Who do you live with? Spouse   If you need help, do you have trouble  finding someone available to you? No   Have you been bothered in the last four weeks by sexual problems? No   Do you have difficulty concentrating, remembering or making decisions? No       Age-appropriate Screening Schedule:  Refer to the list below for future screening recommendations based on patient's age, sex and/or medical conditions. Orders for these recommended tests are listed in the plan section. The patient has been provided with a written plan.    Health Maintenance   Topic Date Due   • URINE MICROALBUMIN  Never done   • TDAP/TD VACCINES (1 - Tdap) Never done   • ZOSTER VACCINE (1 of 2) Never done   • DIABETIC FOOT EXAM  Never done   • INFLUENZA VACCINE  10/01/2022   • HEMOGLOBIN A1C  12/23/2022   • DIABETIC EYE EXAM  04/29/2023   • LIPID PANEL  06/23/2023            Assessment & Plan   CMS Preventative Services Quick Reference  Risk Factors Identified During Encounter  Cardiovascular Disease  Chronic Pain   Depression/Dysphoria  Fall Risk-High or Moderate  Inadequate Social Support, Isolation, Loneliness, Lack of Transportation, Financial Difficulties, or Caregiver Stress   Inactivity/Sedentary  Obesity/Overweight   Urinary Incontinence  The above risks/problems have been discussed with the patient.  Follow up actions/plans if indicated are seen below in the Assessment/Plan Section.  Pertinent information has been shared with the patient in the After Visit Summary.    Diagnoses and all orders for this visit:    1. Type 2 diabetes mellitus with stage 3b chronic kidney disease, with long-term current use of insulin (HCC) (Primary)  -     Hemoglobin A1c; Future  -     Hemoglobin A1c    2. Type 2 diabetes mellitus with peripheral neuropathy (HCC)    3. Encounter for long-term (current) use of other medications  -     CBC Auto Differential; Future  -     Comprehensive Metabolic Panel; Future  -     CBC Auto Differential  -     Comprehensive Metabolic Panel    4. Mixed hyperlipidemia  -     Lipid Panel;  Future  -     Lipid Panel    5. Cardiomyopathy, dilated (HCC)    6. Hyperlipidemia LDL goal <70    7. Normochromic normocytic anemia    8. Urge incontinence of urine    9. Nocturia    10. Lower urinary tract symptoms due to benign prostatic hyperplasia    11. Chronic systolic heart failure (HCC)    12. Deficiency anemia  -     Ferritin; Future  -     Iron; Future  -     Vitamin B12; Future  -     Folate; Future  -     Ferritin  -     Iron  -     Vitamin B12  -     Folate    13. Overactive bladder    14. Fatigue, unspecified type  -     TSH; Future  -     TSH    15. Primary osteoarthritis of both hands  -     Ambulatory Referral to Orthopedic Surgery    16. Diabetic ulcer of toe of right foot associated with type 2 diabetes mellitus, limited to breakdown of skin (HCC)  -     Ambulatory Referral to Wound Clinic        Follow Up:  No follow-ups on file.     An After Visit Summary and PPPS were made available to the patient.        Patient did say the arthritis in his hands is worse and wanted to be referred to an orthopedic hand specialist.  We are trying to increase his gabapentin to 1-3 100 mg pills at at bedtime to help with better sleep, as he still has not been sleeping well.  The dose can be titrated further if needed.  He was once again advised to use meloxicam very sparingly if at all and the hazards are risk were discussed.  Labs were drawn and meds refilled

## 2022-06-26 NOTE — TELEPHONE ENCOUNTER
Computer is saying they are incomplete variables or smart list that need to be completed before I can close the note.  Do you know what this is?  Please let me know

## 2022-06-27 ENCOUNTER — TELEPHONE (OUTPATIENT)
Dept: FAMILY MEDICINE CLINIC | Facility: CLINIC | Age: 77
End: 2022-06-27

## 2022-06-27 NOTE — TELEPHONE ENCOUNTER
Not sure if you can help me but I did an initial wellness exam on this patient last week, and the computer is not letting me close at the note because it said that incomplete variables or smart sets are pending on this patient that I need to fill out.  Betsey is not here so I thought I would ask you if you can help me figure this 1 out.  Thanks

## 2022-07-19 ENCOUNTER — OFFICE VISIT (OUTPATIENT)
Dept: FAMILY MEDICINE CLINIC | Facility: CLINIC | Age: 77
End: 2022-07-19

## 2022-07-19 VITALS
RESPIRATION RATE: 18 BRPM | DIASTOLIC BLOOD PRESSURE: 70 MMHG | HEART RATE: 85 BPM | SYSTOLIC BLOOD PRESSURE: 122 MMHG | BODY MASS INDEX: 30.43 KG/M2 | WEIGHT: 212.6 LBS | HEIGHT: 70 IN | OXYGEN SATURATION: 98 %

## 2022-07-19 DIAGNOSIS — E11.42 TYPE 2 DIABETES MELLITUS WITH PERIPHERAL NEUROPATHY: ICD-10-CM

## 2022-07-19 DIAGNOSIS — Z79.899 ENCOUNTER FOR LONG-TERM (CURRENT) USE OF OTHER MEDICATIONS: ICD-10-CM

## 2022-07-19 DIAGNOSIS — I42.0 CARDIOMYOPATHY, DILATED: ICD-10-CM

## 2022-07-19 DIAGNOSIS — I50.22 CHRONIC SYSTOLIC HEART FAILURE: ICD-10-CM

## 2022-07-19 DIAGNOSIS — K21.9 GASTROESOPHAGEAL REFLUX DISEASE WITHOUT ESOPHAGITIS: ICD-10-CM

## 2022-07-19 DIAGNOSIS — I51.89 DIASTOLIC DYSFUNCTION: ICD-10-CM

## 2022-07-19 DIAGNOSIS — F51.01 PRIMARY INSOMNIA: Primary | ICD-10-CM

## 2022-07-19 PROCEDURE — 99214 OFFICE O/P EST MOD 30 MIN: CPT | Performed by: FAMILY MEDICINE

## 2022-07-19 RX ORDER — ESZOPICLONE 1 MG/1
TABLET, FILM COATED ORAL
Qty: 30 TABLET | Refills: 5 | Status: SHIPPED | OUTPATIENT
Start: 2022-07-19 | End: 2022-12-16

## 2022-07-20 PROBLEM — I51.89 DIASTOLIC DYSFUNCTION: Status: ACTIVE | Noted: 2022-07-20

## 2022-07-20 PROBLEM — K21.9 GASTROESOPHAGEAL REFLUX DISEASE WITHOUT ESOPHAGITIS: Status: ACTIVE | Noted: 2022-07-20

## 2022-07-20 NOTE — PROGRESS NOTES
"Chief Complaint  ER follow up    Subjective      Joe Fung presents to [facility]  History of Present Illness  Patient woke up at 5 AM recently with severe pain in the upper chest.  His wife drove him to the emergency room, and cardiac work-up was unremarkable.  He said he was given a GI cocktail which instantly relieved his symptoms.  He says he has had acid reflux for many years but never had symptoms like this before.  There has been no dysphagia or odynophagia, and he did not eat anything out of the ordinary.  No blood in stool or melena.  He has not had any recurrence of symptoms.  When he has had cardiac symptoms in the past, they have been different from this as well.  He has followed up with his cardiologist as well.  We discussed differential diagnosis.    Patient says he still is having insomnia problems.  When he tries to take a little bit more gabapentin at bedtime, he stays drowsy a lot of the day.  Therefore this is not a suitable option.  Nonpharmacologic measures have not been of benefit to him.  He denies any depression or anxiety symptoms currently.    Objective   Vital Signs:   Vitals:    07/19/22 1535   BP: 122/70   Pulse: 85   Resp: 18   SpO2: 98%   Weight: 96.4 kg (212 lb 9.6 oz)   Height: 177.8 cm (70\")      /70   Pulse 85   Resp 18   Ht 177.8 cm (70\")   Wt 96.4 kg (212 lb 9.6 oz)   SpO2 98%   BMI 30.50 kg/m²     Body mass index is 30.5 kg/m².    Review of Systems   Constitutional: Negative for activity change, appetite change, chills, diaphoresis and fever.   HENT: Negative for nosebleeds, sore throat and voice change.    Respiratory: Negative for choking, chest tightness, shortness of breath and wheezing.    Cardiovascular: Negative for chest pain, palpitations and leg swelling.   Gastrointestinal: Negative for abdominal distention, abdominal pain, anal bleeding, blood in stool, constipation, diarrhea, nausea, vomiting, GERD and indigestion. "   Endocrine: Negative for polydipsia and polyphagia.   Genitourinary: Negative for dysuria, frequency, hematuria and urgency.   Musculoskeletal: Negative for back pain, joint swelling, myalgias and neck pain.   Neurological: Positive for numbness. Negative for tremors, seizures, syncope, facial asymmetry, speech difficulty, headache, memory problem and confusion.   Hematological: Negative for adenopathy.       Past History:  Medical History: has a past medical history of Adenomatous polyp of colon, Bilateral exudative age-related macular degeneration (HCC), BMI 33.0-33.9,adult, BPH associated with nocturia, Bursitis of left elbow, Chronic fatigue, Chronic systolic heart failure ([facility]), CKD (chronic kidney disease), Diabetic retinopathy ([facility]), Diastolic dysfunction, Dilated cardiomyopathy ([facility]), Elevated lipids, Frequent falls, Insomnia, unspecified, Long term (current) use of insulin ([facility]), Mild episode of recurrent major depressive disorder ([facility]), Mixed hyperlipidemia, Nocturia, Other long term (current) drug therapy, Perennial allergic rhinitis, Peripheral polyneuropathy, Primary osteoarthritis involving multiple joints, Rotator cuff tear, Skin cancer, Small vessel coronary artery disease, Type 2 diabetes mellitus with diabetic neuropathy, with long-term current use of insulin ([facility]), and Vitamin D insufficiency.   Surgical History: has a past surgical history that includes Knee arthroscopy (Bilateral); Rotator cuff repair; Elbow bursa surgery; and Cataract extraction.   Family History: family history includes Cancer in his mother; Diabetes in his maternal grandmother and mother; Heart disease in his maternal grandmother; Stroke in his mother.   Social History: reports that he has never smoked. He has never used smokeless tobacco. He reports current alcohol use. He reports that he does not use drugs.      Current Outpatient Medications:   •  Alpha-Lipoic Acid 600 MG tablet, Take  by mouth., Disp: , Rfl:   •  aspirin  81 MG chewable tablet, Chew 81 mg Daily., Disp: , Rfl:   •  atorvastatin (LIPITOR) 20 MG tablet, , Disp: , Rfl:   •  bumetanide (BUMEX) 2 MG tablet, bumetanide 2 mg tablet  Take 1 tablet every day by oral route in the morning., Disp: , Rfl:   •  cetirizine (zyrTEC) 10 MG tablet, Take 10 mg by mouth Daily., Disp: , Rfl:   •  Cholecalciferol (Vitamin D3) 25 MCG (1000 UT) capsule, Take  by mouth., Disp: , Rfl:   •  Cinnamon 500 MG capsule, Take  by mouth., Disp: , Rfl:   •  coenzyme Q10 100 MG capsule, Take  by mouth., Disp: , Rfl:   •  Continuous Glucose Monitor Sup kit, 1 kit Every 14 (Fourteen) Days. Apply one sensor every 14 days as instructed, Disp: 6 kit, Rfl: 3  •  dapagliflozin (Farxiga) 5 MG tablet tablet, Farxiga 5 mg tablet  Take 1 tablet every day by oral route., Disp: , Rfl:   •  finasteride (PROSCAR) 5 MG tablet, Take 5 mg by mouth Daily., Disp: , Rfl:   •  gabapentin (NEURONTIN) 100 MG capsule, Take 100 mg by mouth 2 (Two) Times a Day As Needed., Disp: , Rfl:   •  hydrALAZINE (APRESOLINE) 25 MG tablet, Take 1 tablet by mouth 2 (two) times a day., Disp: 180 tablet, Rfl: 3  •  insulin glargine (LANTUS, SEMGLEE) 100 UNIT/ML injection, Inject 40 Units under the skin into the appropriate area as directed 2 (Two) Times a Day. QAM QPM, Disp: , Rfl:   •  meloxicam (MOBIC) 7.5 MG tablet, Take  by mouth Daily As Needed. 1-2 TABLETS WITH FOOD, Disp: , Rfl:   •  metFORMIN (GLUCOPHAGE) 500 MG tablet, Take 500 mg by mouth 2 (Two) Times a Day With Meals., Disp: , Rfl:   •  Mirabegron ER (MYRBETRIQ) 50 MG tablet sustained-release 24 hour 24 hr tablet, Myrbetriq 50 mg tablet,extended release  Take 1 tablet every day by oral route in the morning., Disp: , Rfl:   •  montelukast (SINGULAIR) 10 MG tablet, Take 10 mg by mouth Every Night., Disp: , Rfl:   •  multivitamin with minerals tablet tablet, Take 1 tablet by mouth Daily. WITH FOOD, Disp: , Rfl:   •  pantoprazole (PROTONIX) 40 MG EC tablet, pantoprazole 40 mg  tablet,delayed release  Take 1 tablet every day by oral route., Disp: , Rfl:   •  sacubitril-valsartan (Entresto)  MG tablet, Take 1 tablet by mouth 2 (Two) Times a Day., Disp: 180 tablet, Rfl: 3  •  venlafaxine XR (EFFEXOR-XR) 37.5 MG 24 hr capsule, TAKE 1 CAPSULE DAILY WITH  FOOD FOR DEPRESSION AND    CHRONIC BACK/JOINT PAIN., Disp: 90 capsule, Rfl: 0  •  vitamin B-12 (CYANOCOBALAMIN) 1000 MCG tablet, Take 1,000 mcg by mouth Daily., Disp: , Rfl:   •  vitamin E 100 UNIT capsule, Take  by mouth., Disp: , Rfl:   •  eszopiclone (LUNESTA) 1 MG tablet, Take immediately before bedtime, 1 or 2 pills po, Disp: 30 tablet, Rfl: 5    Allergies: Lortab [hydrocodone-acetaminophen]    Physical Exam  Constitutional:       General: He is not in acute distress.     Appearance: He is obese. He is not toxic-appearing.   HENT:      Head: Normocephalic and atraumatic.      Right Ear: Ear canal and external ear normal.      Left Ear: Ear canal and external ear normal.      Nose: Nose normal.      Mouth/Throat:      Mouth: Mucous membranes are moist.      Pharynx: Oropharynx is clear.   Eyes:      General: No scleral icterus.     Extraocular Movements: Extraocular movements intact.      Conjunctiva/sclera: Conjunctivae normal.      Pupils: Pupils are equal, round, and reactive to light.   Neck:      Vascular: No carotid bruit.   Cardiovascular:      Rate and Rhythm: Normal rate and regular rhythm.      Pulses: Normal pulses.      Heart sounds: Normal heart sounds. No murmur heard.    No gallop.   Pulmonary:      Effort: Pulmonary effort is normal.      Breath sounds: Normal breath sounds. No wheezing or rales.   Chest:      Chest wall: No tenderness.   Abdominal:      General: Bowel sounds are normal. There is no distension.      Palpations: Abdomen is soft.      Tenderness: There is no abdominal tenderness. There is no guarding or rebound.   Musculoskeletal:         General: No swelling, tenderness or deformity. Normal range of  motion.      Cervical back: Normal range of motion. No rigidity.      Right lower leg: No edema.      Left lower leg: No edema.   Lymphadenopathy:      Cervical: No cervical adenopathy.   Skin:     General: Skin is warm and dry.      Capillary Refill: Capillary refill takes less than 2 seconds.      Coloration: Skin is not pale.      Findings: No erythema or rash.   Neurological:      General: No focal deficit present.      Mental Status: He is alert and oriented to person, place, and time.      Cranial Nerves: No cranial nerve deficit.      Coordination: Coordination normal.      Gait: Gait abnormal (Somewhat unsteady gait).   Psychiatric:         Mood and Affect: Mood normal.         Behavior: Behavior normal.         Judgment: Judgment normal.          Result Review :                 Assessment and Plan   Diagnoses and all orders for this visit:    1. Primary insomnia (Primary)  -     eszopiclone (LUNESTA) 1 MG tablet; Take immediately before bedtime, 1 or 2 pills po  Dispense: 30 tablet; Refill: 5  Patient has tried nonpharmacologic methods for sleep as well as over-the-counter medications and gabapentin with no relief.  We discussed the risk and possible side effects of sleeping medicines, including memory loss, increased risk for falls and serious or life-threatening injuries, etc.  Patient is willing to accept the risks.  Therefore were going to try Lunesta 1 mg at bedtime, and he may titrate up to 2 mg if needed.  His wife watch him closely and if this is not well-tolerated or ineffective they will stop and call me back.  2. Type 2 diabetes mellitus with peripheral neuropathy (HCC)  Patient's diabetes is well controlled at this time but is peripheral neuropathy does cause ongoing problems with gait.  Patient has been doing cardiac rehab and was improving significantly, but that he had to stop going for some other medical issue.  Therefore, he has lost some ground.  A [facility] nurse said something to him about  possibly going to vestibular rehab, but when I discussed signs and symptoms of benign paroxysmal positional vertigo, he says he does not have those and does not think that will help him.  He has been planning on going to the gym to start exercising more, and is leaving today to go to the local indoor pool to start exercising, so he wishes to just do this for now.  3. Cardiomyopathy, dilated (HCC)  Patient's chronic heart failure seems well controlled, and the episode of chest pain that took him to the ER recently was deemed noncardiac.  We discussed signs and symptoms of coronary ischemia as well as acid reflux disease and esophageal spasm.  If symptoms return he will come back or go to the ER or notify his cardiologist of all the above  4. Chronic systolic heart failure (HCC)    5. Encounter for long-term (current) use of other medications    6. Gastroesophageal reflux disease without esophagitis  Patient's GERD has typically been well controlled with his current medications.  The recent episode was unlike his previous, it may been due to esophageal spasm.  If symptoms return I will refer to GI for upper endoscopy.  7. Diastolic dysfunction                 Follow Up   No follow-ups on file.  Patient was given instructions and counseling regarding his condition or for health maintenance advice. Please see specific information pulled into the AVS if appropriate.     Vimal Irene MD

## 2022-08-18 RX ORDER — VENLAFAXINE HYDROCHLORIDE 37.5 MG/1
37.5 CAPSULE, EXTENDED RELEASE ORAL DAILY
Qty: 90 CAPSULE | Refills: 3 | Status: SHIPPED | OUTPATIENT
Start: 2022-08-18

## 2022-08-18 RX ORDER — TORSEMIDE 10 MG/1
TABLET ORAL
Qty: 90 TABLET | Refills: 0 | OUTPATIENT
Start: 2022-08-18

## 2022-08-18 RX ORDER — VENLAFAXINE HYDROCHLORIDE 37.5 MG/1
CAPSULE, EXTENDED RELEASE ORAL
Qty: 90 CAPSULE | Refills: 0 | OUTPATIENT
Start: 2022-08-18

## 2022-08-18 RX ORDER — METOPROLOL SUCCINATE 50 MG/1
TABLET, EXTENDED RELEASE ORAL
Qty: 90 TABLET | Refills: 0 | OUTPATIENT
Start: 2022-08-18

## 2022-08-18 NOTE — TELEPHONE ENCOUNTER
I sent in the venlafaxine already for 90 with 3 refills. However, the Metoprolol ER and the Torsemide are not on his current medicine list? Please check with pt or his wife--she often knows more about his meds.

## 2022-08-31 RX ORDER — VENLAFAXINE HYDROCHLORIDE 37.5 MG/1
37.5 CAPSULE, EXTENDED RELEASE ORAL DAILY
Qty: 90 CAPSULE | Refills: 3 | OUTPATIENT
Start: 2022-08-31

## 2022-09-01 NOTE — TELEPHONE ENCOUNTER
Was sent on August 18, 2022 for 90 with 3 refills.  If his mail order pharmacy is having difficulty getting this to him on time, please see if they want us to send a 30-day prescription to the local pharmacy

## 2022-10-11 ENCOUNTER — OFFICE VISIT (OUTPATIENT)
Dept: ONCOLOGY | Facility: CLINIC | Age: 77
End: 2022-10-11

## 2022-10-11 VITALS
DIASTOLIC BLOOD PRESSURE: 74 MMHG | WEIGHT: 215 LBS | TEMPERATURE: 97.8 F | OXYGEN SATURATION: 97 % | SYSTOLIC BLOOD PRESSURE: 138 MMHG | RESPIRATION RATE: 20 BRPM | HEART RATE: 83 BPM | HEIGHT: 70 IN | BODY MASS INDEX: 30.78 KG/M2

## 2022-10-11 DIAGNOSIS — D64.9 NORMOCHROMIC NORMOCYTIC ANEMIA: Primary | Chronic | ICD-10-CM

## 2022-10-11 PROCEDURE — 99213 OFFICE O/P EST LOW 20 MIN: CPT | Performed by: INTERNAL MEDICINE

## 2022-10-11 NOTE — PROGRESS NOTES
"CHIEF COMPLAINT: No new somatic complaints    Problem List:  Oncology/Hematology History Overview Note   1.  Normochromic normocytic anemia  2.  History of adenoma  3.  Diastolic dysfunction   4.  Diabetes  5.  Hyperlipidemia  6.  Polyneuropathy  7.  Macular degeneration  8.  Chronic kidney disease  9.  Diarrhea felt to be due to Bumex  10.  Osteoarthritis    Hematology history timeline:  -2019 colonoscopy with polyps.  Next 1 due 2024.  -12/22/2020 iron 91.  Ferritin 63.6  -November 2021 hemoglobin 12.4 with normal ferritin, iron, B12, folate Per primary care note.  -2/10/2022 iron 82 ferritin 67.5.  Folic acid greater than 22.5.  Hemoglobin 11.9 with MCV 97 and normal white count platelet count and differential.  - 3/16/2022 EGD duodenal biopsy x2, stomach antrum biopsy x2 unremarkable.  Descending colon polyp on colonoscopy as well as sigmoid polyp both tubular adenomas without dysplasia    -5/31/2022 [facility] hematology initial consultation: Here because of his \"anemia\".  He is barely anemic with normochromic normocytic indices which would make iron deficiency or folate deficiency or B12 deficiency unlikely and none of the serologies suggest this.  His wife and he are both convinced he has had an EGD and a colonoscopy done within the last year with Dr. Jerez and I will try to get those results but in the meantime before putting him through an M panel, hemolytic work-up, megaloblastic work-up, and certainly before do anything more aggressive like a bone marrow biopsy to look for myelodysplasia, I will simply repeat his blood work now and again in 4 to 5 months to see what the trend is.  If it is normalizing or stable he is not sure how much work-up he did want to do.  The odds of myelodysplasia or some more sinister marrow disorder with normochromic normocytic indices is unlikely.  With a combination of mild chronic kidney disease and polyneuropathy, if his anemia worsens we will also check the M panel for " possible monoclonal gammopathy as a hint towards plasma cell dyscrasia.    -5/31/2022 hemoglobin 11.5 With MCV 96 otherwise unremarkable CBC and differential.  -6/23/2022 hemoglobin 13 MCV 97.  Ferritin 87 with iron 73, B12 greater than 2000, and folate greater than 20  - 10/6/2022 hemoglobin 12.8 with MCV 99.2 otherwise unremarkable CBC       Normochromic normocytic anemia       HISTORY OF PRESENT ILLNESS:  The patient is a 77 y.o. 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)    • BMI 33.0-33.9,adult    • BPH associated with nocturia    • Bursitis of left elbow    • Chronic fatigue    • Chronic systolic heart failure (HCC)    • CKD (chronic kidney disease)    • Diabetic retinopathy (HCC)    • Diastolic dysfunction    • Dilated cardiomyopathy (HCC)    • Elevated lipids    • Frequent falls    • Insomnia, unspecified    • Long term (current) use of insulin ([facility])    • Mild episode of recurrent major depressive disorder ([facility])    • Mixed hyperlipidemia    • Nocturia    • Other long term (current) drug therapy    • Perennial allergic rhinitis    • Peripheral polyneuropathy    • Primary osteoarthritis involving multiple joints    • Rotator cuff tear    • Skin cancer    • Small vessel coronary artery disease    • Type 2 diabetes mellitus with diabetic neuropathy, with long-term current use of insulin ([facility])    • Vitamin D insufficiency      Past Surgical History:   Procedure Laterality Date   • CATARACT EXTRACTION     • ELBOW OLECRANNON BURSA EXCISION     • KNEE ARTHROSCOPY Bilateral    • ROTATOR CUFF REPAIR         Allergies   Allergen Reactions   • Lortab [Hydrocodone-Acetaminophen] Nausea Only       Family History and Social History reviewed and changed as necessary    REVIEW OF SYSTEM:   No change in color caliber or consistency of stools.  Feels fine.    PHYSICAL EXAM:  No  "jaundice or icterus.  No pallor    Vitals:    10/11/22 1526   Height: 177.8 cm (70\")     There were no vitals filed for this visit.       ECOG score: 2           Vitals reviewed.      Lab Results   Component Value Date    HGB 13.0 06/23/2022    HCT 38.1 06/23/2022    MCV 97 06/23/2022     06/23/2022    WBC 10.0 06/23/2022    NEUTROABS 4.7 06/23/2022    LYMPHSABS 4.3 (H) 06/23/2022    MONOSABS 0.6 06/23/2022    EOSABS 0.3 06/23/2022    BASOSABS 0.1 06/23/2022       Lab Results   Component Value Date    GLUCOSE 113 (H) 06/23/2022    BUN 35 (H) 06/23/2022    CREATININE 1.15 06/23/2022     06/23/2022    K 4.7 06/23/2022     06/23/2022    CO2 26 06/23/2022    CALCIUM 9.0 06/23/2022    ALBUMIN 4.3 06/23/2022    BILITOT 1.4 (H) 06/23/2022    ALKPHOS 101 06/23/2022    AST 29 06/23/2022    ALT 19 06/23/2022             ASSESSMENT & PLAN:  1.  Normochromic normocytic anemia  2.  History of adenoma  3.  Diastolic dysfunction   4.  Diabetes  5.  Hyperlipidemia  6.  Polyneuropathy  7.  Macular degeneration  8.  Chronic kidney disease  9.  Diarrhea felt to be due to Bumex  10.  Osteoarthritis    Hematology history timeline:  -2019 colonoscopy with polyps.  Next 1 due 2024.  -12/22/2020 iron 91.  Ferritin 63.6  -November 2021 hemoglobin 12.4 with normal ferritin, iron, B12, folate Per primary care note.  -2/10/2022 iron 82 ferritin 67.5.  Folic acid greater than 22.5.  Hemoglobin 11.9 with MCV 97 and normal white count platelet count and differential.  - 3/16/2022 EGD duodenal biopsy x2, stomach antrum biopsy x2 unremarkable.  Descending colon polyp on colonoscopy as well as sigmoid polyp both tubular adenomas without dysplasia    -5/31/2022 [facility] hematology initial consultation: Here because of his \"anemia\".  He is barely anemic with normochromic normocytic indices which would make iron deficiency or folate deficiency or B12 deficiency unlikely and none of the serologies suggest this.  His wife and he are " both convinced he has had an EGD and a colonoscopy done within the last year with Dr. Jerez and I will try to get those results but in the meantime before putting him through an M panel, hemolytic work-up, megaloblastic work-up, and certainly before do anything more aggressive like a bone marrow biopsy to look for myelodysplasia, I will simply repeat his blood work now and again in 4 to 5 months to see what the trend is.  If it is normalizing or stable he is not sure how much work-up he did want to do.  The odds of myelodysplasia or some more sinister marrow disorder with normochromic normocytic indices is unlikely.  With a combination of mild chronic kidney disease and polyneuropathy, if his anemia worsens we will also check the M panel for possible monoclonal gammopathy as a hint towards plasma cell dyscrasia.    -5/31/2022 hemoglobin 11.5 With MCV 96 otherwise unremarkable CBC and differential.  -6/23/2022 hemoglobin 13 MCV 97.  Ferritin 87 with iron 73, B12 greater than 2000, and folate greater than 20  - 10/6/2022 hemoglobin 12.8 with MCV 99.2 otherwise unremarkable CBC    -10/11/2022 [facility] hematology follow-up: Over the last 6 months, he essentially has not been anemic.  He is mildly macrocytic but without B12 or folate deficiency.  No further hematologic evaluation at this junction unless he develops significant anemia I.  E.  Refer him back if he needs less than a hemoglobin of 10 but otherwise no further hematology work-up.  I will see as needed.    Total time of care today 15 minutes      Afshin Das MD    10/11/2022       vomiting

## 2022-10-24 DIAGNOSIS — I42.0 CARDIOMYOPATHY, DILATED: ICD-10-CM

## 2022-10-24 RX ORDER — SACUBITRIL AND VALSARTAN 97; 103 MG/1; MG/1
TABLET, FILM COATED ORAL
Qty: 180 TABLET | Refills: 0 | Status: SHIPPED | OUTPATIENT
Start: 2022-10-24 | End: 2022-12-16 | Stop reason: SDUPTHER

## 2022-10-24 RX ORDER — FINASTERIDE 5 MG/1
TABLET, FILM COATED ORAL
Qty: 90 TABLET | Refills: 3 | Status: SHIPPED | OUTPATIENT
Start: 2022-10-24

## 2022-10-24 RX ORDER — MONTELUKAST SODIUM 10 MG/1
TABLET ORAL
Qty: 90 TABLET | Refills: 3 | Status: SHIPPED | OUTPATIENT
Start: 2022-10-24

## 2022-10-24 RX ORDER — ATORVASTATIN CALCIUM 20 MG/1
TABLET, FILM COATED ORAL
Qty: 90 TABLET | Refills: 3 | Status: SHIPPED | OUTPATIENT
Start: 2022-10-24

## 2022-11-22 DIAGNOSIS — E11.42 TYPE 2 DIABETES MELLITUS WITH PERIPHERAL NEUROPATHY: Primary | ICD-10-CM

## 2022-11-22 RX ORDER — GABAPENTIN 100 MG/1
100 CAPSULE ORAL 2 TIMES DAILY PRN
Qty: 180 CAPSULE | Refills: 1 | Status: SHIPPED | OUTPATIENT
Start: 2022-11-22 | End: 2023-03-17 | Stop reason: SDUPTHER

## 2022-11-22 RX ORDER — GABAPENTIN 100 MG/1
100 CAPSULE ORAL 2 TIMES DAILY PRN
Qty: 60 CAPSULE | Refills: 0 | Status: SHIPPED | OUTPATIENT
Start: 2022-11-22 | End: 2022-11-22 | Stop reason: SDUPTHER

## 2022-11-29 RX ORDER — INSULIN GLARGINE 100 [IU]/ML
INJECTION, SOLUTION SUBCUTANEOUS
Qty: 75 ML | Refills: 3 | Status: SHIPPED | OUTPATIENT
Start: 2022-11-29

## 2022-11-29 RX ORDER — PANTOPRAZOLE SODIUM 40 MG/1
TABLET, DELAYED RELEASE ORAL
Qty: 90 TABLET | Refills: 3 | Status: SHIPPED | OUTPATIENT
Start: 2022-11-29

## 2022-11-30 ENCOUNTER — TELEPHONE (OUTPATIENT)
Dept: FAMILY MEDICINE CLINIC | Facility: CLINIC | Age: 77
End: 2022-11-30

## 2022-11-30 RX ORDER — DAPAGLIFLOZIN 5 MG/1
TABLET, FILM COATED ORAL
Qty: 30 TABLET | Status: CANCELLED | OUTPATIENT
Start: 2022-11-30

## 2022-12-05 RX ORDER — DAPAGLIFLOZIN 5 MG/1
5 TABLET, FILM COATED ORAL DAILY
Qty: 90 TABLET | Refills: 1 | Status: SHIPPED | OUTPATIENT
Start: 2022-12-05 | End: 2022-12-05

## 2022-12-05 RX ORDER — DAPAGLIFLOZIN 10 MG/1
10 TABLET, FILM COATED ORAL DAILY
Qty: 90 TABLET | Refills: 3 | Status: SHIPPED | OUTPATIENT
Start: 2022-12-05

## 2022-12-05 NOTE — TELEPHONE ENCOUNTER
I spoke to Winter the wife, She said patient is taking Farxiga 10mg daily. Original script was wrote 2/2022 by you. [place] and I am not sure if it is in the database for the pharmacy's to e-scribe this.

## 2022-12-16 ENCOUNTER — OFFICE VISIT (OUTPATIENT)
Dept: FAMILY MEDICINE CLINIC | Facility: CLINIC | Age: 77
End: 2022-12-16

## 2022-12-16 VITALS
HEART RATE: 98 BPM | BODY MASS INDEX: 29.99 KG/M2 | RESPIRATION RATE: 18 BRPM | OXYGEN SATURATION: 97 % | WEIGHT: 209 LBS | DIASTOLIC BLOOD PRESSURE: 72 MMHG | SYSTOLIC BLOOD PRESSURE: 120 MMHG

## 2022-12-16 DIAGNOSIS — I42.0 CARDIOMYOPATHY, DILATED: ICD-10-CM

## 2022-12-16 DIAGNOSIS — M19.042 PRIMARY OSTEOARTHRITIS OF BOTH HANDS: ICD-10-CM

## 2022-12-16 DIAGNOSIS — N40.1 LOWER URINARY TRACT SYMPTOMS DUE TO BENIGN PROSTATIC HYPERPLASIA: ICD-10-CM

## 2022-12-16 DIAGNOSIS — I51.89 DIASTOLIC DYSFUNCTION: ICD-10-CM

## 2022-12-16 DIAGNOSIS — N32.81 OVERACTIVE BLADDER: ICD-10-CM

## 2022-12-16 DIAGNOSIS — K21.9 GASTROESOPHAGEAL REFLUX DISEASE WITHOUT ESOPHAGITIS: ICD-10-CM

## 2022-12-16 DIAGNOSIS — M19.041 PRIMARY OSTEOARTHRITIS OF BOTH HANDS: ICD-10-CM

## 2022-12-16 DIAGNOSIS — D53.9 DEFICIENCY ANEMIA: ICD-10-CM

## 2022-12-16 DIAGNOSIS — E78.5 HYPERLIPIDEMIA LDL GOAL <70: ICD-10-CM

## 2022-12-16 DIAGNOSIS — R53.83 OTHER FATIGUE: ICD-10-CM

## 2022-12-16 DIAGNOSIS — E11.42 TYPE 2 DIABETES MELLITUS WITH PERIPHERAL NEUROPATHY: Primary | ICD-10-CM

## 2022-12-16 DIAGNOSIS — Z79.899 ENCOUNTER FOR LONG-TERM (CURRENT) USE OF OTHER MEDICATIONS: ICD-10-CM

## 2022-12-16 DIAGNOSIS — I50.22 CHRONIC SYSTOLIC HEART FAILURE: ICD-10-CM

## 2022-12-16 DIAGNOSIS — F51.04 PSYCHOPHYSIOLOGICAL INSOMNIA: Primary | ICD-10-CM

## 2022-12-16 DIAGNOSIS — N39.41 URGE INCONTINENCE OF URINE: ICD-10-CM

## 2022-12-16 LAB
POC AMPHETAMINES: NEGATIVE
POC COCAINE: NEGATIVE
POC MICROALBUMIN URINE: NORMAL
POC OPIATES: NEGATIVE
POC THC: NEGATIVE

## 2022-12-16 PROCEDURE — 99214 OFFICE O/P EST MOD 30 MIN: CPT | Performed by: FAMILY MEDICINE

## 2022-12-16 PROCEDURE — 80305 DRUG TEST PRSMV DIR OPT OBS: CPT | Performed by: FAMILY MEDICINE

## 2022-12-16 PROCEDURE — 82044 UR ALBUMIN SEMIQUANTITATIVE: CPT | Performed by: FAMILY MEDICINE

## 2022-12-16 PROCEDURE — 36415 COLL VENOUS BLD VENIPUNCTURE: CPT | Performed by: FAMILY MEDICINE

## 2022-12-16 RX ORDER — ESZOPICLONE 2 MG/1
2 TABLET, FILM COATED ORAL NIGHTLY
Qty: 30 TABLET | Refills: 5 | Status: SHIPPED | OUTPATIENT
Start: 2022-12-16

## 2022-12-16 RX ORDER — SACUBITRIL AND VALSARTAN 97; 103 MG/1; MG/1
1 TABLET, FILM COATED ORAL 2 TIMES DAILY
Qty: 180 TABLET | Refills: 1 | Status: SHIPPED | OUTPATIENT
Start: 2022-12-16 | End: 2023-03-17 | Stop reason: SDUPTHER

## 2022-12-17 LAB
ALBUMIN SERPL-MCNC: 4.9 G/DL (ref 3.7–4.7)
ALBUMIN/GLOB SERPL: 2.2 {RATIO} (ref 1.2–2.2)
ALP SERPL-CCNC: 103 IU/L (ref 44–121)
ALT SERPL-CCNC: 16 IU/L (ref 0–44)
AST SERPL-CCNC: 20 IU/L (ref 0–40)
BASOPHILS # BLD AUTO: 0.1 X10E3/UL (ref 0–0.2)
BASOPHILS NFR BLD AUTO: 1 %
BILIRUB SERPL-MCNC: 1.5 MG/DL (ref 0–1.2)
BUN SERPL-MCNC: 25 MG/DL (ref 8–27)
BUN/CREAT SERPL: 26 (ref 10–24)
CALCIUM SERPL-MCNC: 9.1 MG/DL (ref 8.6–10.2)
CHLORIDE SERPL-SCNC: 108 MMOL/L (ref 96–106)
CHOLEST SERPL-MCNC: 111 MG/DL (ref 100–199)
CO2 SERPL-SCNC: 19 MMOL/L (ref 20–29)
CREAT SERPL-MCNC: 0.98 MG/DL (ref 0.76–1.27)
EGFRCR SERPLBLD CKD-EPI 2021: 80 ML/MIN/1.73
EOSINOPHIL # BLD AUTO: 0.2 X10E3/UL (ref 0–0.4)
EOSINOPHIL NFR BLD AUTO: 3 %
ERYTHROCYTE [DISTWIDTH] IN BLOOD BY AUTOMATED COUNT: 13.1 % (ref 11.6–15.4)
FERRITIN SERPL-MCNC: 38 NG/ML (ref 30–400)
FOLATE SERPL-MCNC: >20 NG/ML
GLOBULIN SER CALC-MCNC: 2.2 G/DL (ref 1.5–4.5)
GLUCOSE SERPL-MCNC: 128 MG/DL (ref 70–99)
HBA1C MFR BLD: 5.8 % (ref 4.8–5.6)
HCT VFR BLD AUTO: 39.2 % (ref 37.5–51)
HDLC SERPL-MCNC: 35 MG/DL
HGB BLD-MCNC: 13.3 G/DL (ref 13–17.7)
IMM GRANULOCYTES # BLD AUTO: 0 X10E3/UL (ref 0–0.1)
IMM GRANULOCYTES NFR BLD AUTO: 0 %
IRON SERPL-MCNC: 76 UG/DL (ref 38–169)
LDLC SERPL CALC-MCNC: 43 MG/DL (ref 0–99)
LYMPHOCYTES # BLD AUTO: 2.9 X10E3/UL (ref 0.7–3.1)
LYMPHOCYTES NFR BLD AUTO: 32 %
MAGNESIUM SERPL-MCNC: 2.4 MG/DL (ref 1.6–2.3)
MCH RBC QN AUTO: 32.4 PG (ref 26.6–33)
MCHC RBC AUTO-ENTMCNC: 33.9 G/DL (ref 31.5–35.7)
MCV RBC AUTO: 96 FL (ref 79–97)
MONOCYTES # BLD AUTO: 0.6 X10E3/UL (ref 0.1–0.9)
MONOCYTES NFR BLD AUTO: 7 %
NEUTROPHILS # BLD AUTO: 5.1 X10E3/UL (ref 1.4–7)
NEUTROPHILS NFR BLD AUTO: 57 %
PLATELET # BLD AUTO: 210 X10E3/UL (ref 150–450)
POTASSIUM SERPL-SCNC: 4.3 MMOL/L (ref 3.5–5.2)
PROT SERPL-MCNC: 7.1 G/DL (ref 6–8.5)
RBC # BLD AUTO: 4.1 X10E6/UL (ref 4.14–5.8)
SODIUM SERPL-SCNC: 145 MMOL/L (ref 134–144)
T4 FREE SERPL-MCNC: 1.08 NG/DL (ref 0.82–1.77)
TRIGL SERPL-MCNC: 201 MG/DL (ref 0–149)
TSH SERPL DL<=0.005 MIU/L-ACNC: 2.39 UIU/ML (ref 0.45–4.5)
VIT B12 SERPL-MCNC: 882 PG/ML (ref 232–1245)
VLDLC SERPL CALC-MCNC: 33 MG/DL (ref 5–40)
WBC # BLD AUTO: 8.9 X10E3/UL (ref 3.4–10.8)

## 2022-12-18 NOTE — PROGRESS NOTES
Chief Complaint  Diabetes    Subjective      Joe Fung presents to [facility] PRIMARY [facility]  History of Present Illness  Patient is here for 6-month follow-up on diabetes and multiple other chronic health problems.  His glucose has been running 117-136 most of the time when checked fasting, and 178-216 when checked 2 hours postprandial.  He has not had significant hypoglycemic symptoms in a while.  His mood has been good.  He still feels tired all the time, which is nothing new, but unfortunately is never improved.  He says his BPH symptoms are better and that he only has nocturia x1 now.  He did recently have a CT scan done by his cardiologist that showed an ascending aortic aneurysm of 4.8 cm, and cardiology will be following up on this.  The CT also showed a patulous esophagus, but the patient has been to gastroenterology and just had an EGD and colonoscopy about a year ago, so this is really nothing new.  His insomnia improved a little bit with Lunesta 1 mg, but at least half the time he still wakes up after 4 to 5 hours, and at times he is try taking to the pills as I have advised him to do, and 2 mg works much better without any significant side effects.  However, he will need a new prescription for the 10 mg pill sent in.  Objective   Vital Signs:   Vitals:    12/16/22 1429   BP: 120/72   Pulse: 98   Resp: 18   SpO2: 97%   Weight: 94.8 kg (209 lb)      /72   Pulse 98   Resp 18   Wt 94.8 kg (209 lb)   SpO2 97%   BMI 29.99 kg/m²     Body mass index is 29.99 kg/m².    Review of Systems   Constitutional: Positive for fatigue. Negative for chills, diaphoresis, fever and unexpected weight loss.   HENT: Negative for ear discharge, ear pain, mouth sores, nosebleeds, rhinorrhea, sinus pressure, sore throat, swollen glands and trouble swallowing.    Eyes: Negative for blurred vision, double vision, pain, redness and visual disturbance.   Respiratory: Negative for cough, chest  tightness, shortness of breath and wheezing.    Cardiovascular: Negative for chest pain, palpitations and leg swelling.        PND, orthopnea   Gastrointestinal: Negative for abdominal distention, abdominal pain, blood in stool, constipation, diarrhea, nausea, vomiting and GERD.        Dysphagia, odynophagia   Endocrine: Negative for polydipsia, polyphagia and polyuria.   Genitourinary: Positive for nocturia. Negative for dysuria, hematuria and urinary incontinence.   Musculoskeletal: Negative for arthralgias (unusual/atypica), back pain, gait problem, joint swelling and myalgias.   Skin: Negative for rash, skin lesions (worrisome/suspicious), wound and bruise.   Allergic/Immunologic: Negative for food allergies.   Neurological: Negative for dizziness, tremors, seizures, syncope, weakness, light-headedness, numbness and headache.   Hematological: Negative for adenopathy. Does not bruise/bleed easily.   Psychiatric/Behavioral: Positive for sleep disturbance. Negative for suicidal ideas and depressed mood. The patient is not nervous/anxious.        Past History:  Medical History: has a past medical history of Adenomatous polyp of colon, Bilateral exudative age-related macular degeneration (HCC), BMI 33.0-33.9,adult, BPH associated with nocturia, Bursitis of left elbow, Chronic fatigue, Chronic systolic heart failure ([facility]), CKD (chronic kidney disease), Diabetic retinopathy ([facility]), Diastolic dysfunction, Dilated cardiomyopathy (HCC), Elevated lipids, Frequent falls, Insomnia, unspecified, Long term (current) use of insulin (HCC), Mild episode of recurrent major depressive disorder ([facility]), Mixed hyperlipidemia, Nocturia, Other long term (current) drug therapy, Perennial allergic rhinitis, Peripheral polyneuropathy, Primary osteoarthritis involving multiple joints, Rotator cuff tear, Skin cancer, Small vessel coronary artery disease, Type 2 diabetes mellitus with diabetic neuropathy, with long-term current use of insulin  (HCC), and Vitamin D insufficiency.   Surgical History: has a past surgical history that includes Knee arthroscopy (Bilateral); Rotator cuff repair; Elbow bursa surgery; and Cataract extraction.   Family History: family history includes Cancer in his mother; Diabetes in his maternal grandmother and mother; Heart disease in his maternal grandmother; Stroke in his mother.   Social History: reports that he has never smoked. He has never used smokeless tobacco. He reports current alcohol use. He reports that he does not use drugs.      Current Outpatient Medications:   •  Alpha-Lipoic Acid 600 MG tablet, Take  by mouth., Disp: , Rfl:   •  aspirin 81 MG chewable tablet, Chew 81 mg Daily., Disp: , Rfl:   •  atorvastatin (LIPITOR) 20 MG tablet, TAKE 1 TABLET DAILY, Disp: 90 tablet, Rfl: 3  •  bumetanide (BUMEX) 2 MG tablet, bumetanide 2 mg tablet  Take 1 tablet every day by oral route in the morning., Disp: , Rfl:   •  cetirizine (zyrTEC) 10 MG tablet, Take 10 mg by mouth Daily., Disp: , Rfl:   •  Cholecalciferol (Vitamin D3) 25 MCG (1000 UT) capsule, Take  by mouth., Disp: , Rfl:   •  Cinnamon 500 MG capsule, Take  by mouth., Disp: , Rfl:   •  coenzyme Q10 100 MG capsule, Take  by mouth., Disp: , Rfl:   •  Continuous Glucose Monitor Sup kit, 1 kit Every 14 (Fourteen) Days. Apply one sensor every 14 days as instructed, Disp: 6 kit, Rfl: 3  •  dapagliflozin Propanediol (Farxiga) 10 MG tablet, Take 10 mg by mouth Daily., Disp: 90 tablet, Rfl: 3  •  finasteride (PROSCAR) 5 MG tablet, TAKE 1 TABLET DAILY, Disp: 90 tablet, Rfl: 3  •  gabapentin (NEURONTIN) 100 MG capsule, Take 1 capsule by mouth 2 (Two) Times a Day As Needed (neuropathy)., Disp: 180 capsule, Rfl: 1  •  hydrALAZINE (APRESOLINE) 25 MG tablet, Take 1 tablet by mouth 2 (two) times a day., Disp: 180 tablet, Rfl: 3  •  insulin glargine (LANTUS, SEMGLEE) 100 UNIT/ML injection, Inject 40 Units under the skin into the appropriate area as directed 2 (Two) Times a Day.  QAM QPM, Disp: , Rfl:   •  Lantus SoloStar 100 UNIT/ML injection pen, INJECT 40 UNITS            SUBCUTANEOUSLY EVERY       MORNING AND EVERY EVENING, Disp: 75 mL, Rfl: 3  •  meloxicam (MOBIC) 7.5 MG tablet, Take  by mouth Daily As Needed. 1-2 TABLETS WITH FOOD, Disp: , Rfl:   •  Mirabegron ER (MYRBETRIQ) 50 MG tablet sustained-release 24 hour 24 hr tablet, Myrbetriq 50 mg tablet,extended release  Take 1 tablet every day by oral route in the morning., Disp: , Rfl:   •  montelukast (SINGULAIR) 10 MG tablet, TAKE 1 TABLET EVERY EVENING, Disp: 90 tablet, Rfl: 3  •  multivitamin with minerals tablet tablet, Take 1 tablet by mouth Daily. WITH FOOD, Disp: , Rfl:   •  pantoprazole (PROTONIX) 40 MG EC tablet, TAKE 1 TABLET DAILY, Disp: 90 tablet, Rfl: 3  •  venlafaxine XR (EFFEXOR-XR) 37.5 MG 24 hr capsule, Take 1 capsule by mouth Daily., Disp: 90 capsule, Rfl: 3  •  vitamin B-12 (CYANOCOBALAMIN) 1000 MCG tablet, Take 1,000 mcg by mouth Daily., Disp: , Rfl:   •  vitamin E 100 UNIT capsule, Take  by mouth., Disp: , Rfl:   •  eszopiclone (LUNESTA) 2 MG tablet, Take 1 tablet by mouth Every Night. Take immediately before bedtime, Disp: 30 tablet, Rfl: 5  •  metFORMIN (GLUCOPHAGE) 500 MG tablet, Take 1 tablet by mouth 2 (Two) Times a Day With Meals., Disp: 180 tablet, Rfl: 1  •  sacubitril-valsartan (Entresto)  MG tablet, Take 1 tablet by mouth 2 (Two) Times a Day., Disp: 180 tablet, Rfl: 1    Allergies: Lortab [hydrocodone-acetaminophen]    Physical Exam  Constitutional:       General: He is not in acute distress.     Appearance: He is not toxic-appearing.   HENT:      Head: Normocephalic and atraumatic.      Right Ear: Ear canal and external ear normal.      Left Ear: Ear canal and external ear normal.      Nose: Nose normal.      Mouth/Throat:      Mouth: Mucous membranes are moist.      Pharynx: Oropharynx is clear.   Eyes:      General: No scleral icterus.     Extraocular Movements: Extraocular movements intact.       Conjunctiva/sclera: Conjunctivae normal.      Pupils: Pupils are equal, round, and reactive to light.   Neck:      Vascular: No carotid bruit.   Cardiovascular:      Rate and Rhythm: Normal rate. Rhythm irregular.      Pulses: Normal pulses.      Heart sounds: Normal heart sounds.   Pulmonary:      Effort: Pulmonary effort is normal.      Breath sounds: Normal breath sounds.   Chest:      Chest wall: No tenderness.   Abdominal:      General: Bowel sounds are normal. There is no distension.      Palpations: Abdomen is soft.      Tenderness: There is no abdominal tenderness. There is no guarding or rebound.   Musculoskeletal:         General: No swelling or deformity. Normal range of motion.      Cervical back: Normal range of motion. No rigidity.      Right lower leg: No edema.      Left lower leg: No edema.   Lymphadenopathy:      Cervical: No cervical adenopathy.   Skin:     General: Skin is warm and dry.      Capillary Refill: Capillary refill takes less than 2 seconds.      Coloration: Skin is not pale.      Findings: No erythema.   Neurological:      General: No focal deficit present.      Mental Status: He is alert and oriented to person, place, and time.      Cranial Nerves: No cranial nerve deficit.      Motor: No weakness.      Coordination: Coordination normal.      Gait: Gait normal.   Psychiatric:         Mood and Affect: Mood normal.         Behavior: Behavior normal.         Thought Content: Thought content normal.         Judgment: Judgment normal.                   Assessment and Plan   Diagnoses and all orders for this visit:    1. Type 2 diabetes mellitus with peripheral neuropathy (HCC) (Primary)  -     Hemoglobin A1c; Future  -     Hemoglobin A1c  -     POC Microalbumin  Diabetes is under much better control with current medications and those will be continued.  We are checking labs today.  His neuropathy symptoms are well controlled.  2. Hyperlipidemia LDL goal <70  -     Lipid Panel; Future  -      Lipid Panel    3. Cardiomyopathy, dilated (HCC)  Patient's cardiomyopathy is well compensated with current medicines and those will be continued, and this is being comanaged with cardiology  4. Urge incontinence of urine    5. Lower urinary tract symptoms due to benign prostatic hyperplasia    6. Chronic systolic heart failure (HCC)  As stated above under IM #3, this appears well compensated and he will continue current medications, comanaged with cardiology  7. Encounter for long-term (current) use of other medications  -     Comprehensive Metabolic Panel; Future  -     Magnesium; Future  -     Comprehensive Metabolic Panel  -     Magnesium  -     POC Urine Drug Screen, Triage    8. Deficiency anemia  -     CBC & Differential; Future  -     Vitamin B12; Future  -     Ferritin; Future  -     Iron; Future  -     Folate; Future  -     CBC & Differential  -     Vitamin B12  -     Ferritin  -     Iron  -     Folate    9. Overactive bladder    10. Primary osteoarthritis of both hands    11. Gastroesophageal reflux disease without esophagitis    12. Diastolic dysfunction    13. Other fatigue  -     TSH+Free T4; Future  -     TSH+Free T4  Patient's Lunesta will be increased to 2 mg daily, he will follow up with cardiology over his a sending aortic aneurysm as advised, and I will see him back in 6 months or sooner if needed.          Follow Up   No follow-ups on file.  Patient was given instructions and counseling regarding his condition or for health maintenance advice. Please see specific information pulled into the AVS if appropriate.     Vimal Irene MD

## 2022-12-19 DIAGNOSIS — E11.42 TYPE 2 DIABETES MELLITUS WITH PERIPHERAL NEUROPATHY: ICD-10-CM

## 2022-12-19 RX ORDER — GABAPENTIN 100 MG/1
CAPSULE ORAL
Qty: 60 CAPSULE | OUTPATIENT
Start: 2022-12-19

## 2022-12-20 ENCOUNTER — TELEPHONE (OUTPATIENT)
Dept: FAMILY MEDICINE CLINIC | Facility: CLINIC | Age: 77
End: 2022-12-20

## 2022-12-20 NOTE — TELEPHONE ENCOUNTER
Pt's wife called and wanted to know why UDS was done. I explained we have to do those when a pt. Is on a controlled medication. She wanted to know which one would it have been for. I told her it was the gabapentin. She said she didn't like that it was done and there was nothing that warranted it to be done and now they will have to pay for it. I explained that is laws and policy that prescribing providers have to do that on patient that take controlled to be able to prescribe it. She ask what was the law. I told her it was online but that I could give her a copy if she would like. She said that was ok she would look it up and get back to us. I told her that was fine and I was sorry she felt like that but it has to be done.

## 2022-12-27 RX ORDER — PEN NEEDLE, DIABETIC 31 GX5/16"
1 NEEDLE, DISPOSABLE MISCELLANEOUS 2 TIMES DAILY
Qty: 200 EACH | Refills: 3 | Status: SHIPPED | OUTPATIENT
Start: 2022-12-27

## 2022-12-27 NOTE — TELEPHONE ENCOUNTER
Rx Refill Note  Requested Prescriptions     Pending Prescriptions Disp Refills   • Droplet Pen Needles 31G X 8 MM misc [Pharmacy Med Name: DROPLET NDL  PEN [id number]] 100 each 3     Sig: USE FOR LANTUS INJECTION   DAILY.      Last office visit with prescribing clinician: 12/16/2022   Last telemedicine visit with prescribing clinician: 3/17/2023   Next office visit with prescribing clinician: 3/17/2023                         Would you like a call back once the refill request has been completed: [] Yes [] No    If the office needs to give you a call back, can they leave a voicemail: [] Yes [] No    Cristin Earl MA  12/27/22, 10:27 EST

## 2023-03-08 NOTE — TELEPHONE ENCOUNTER
Original phone message was sent on 4/1; Patient said [employer] to get his Entresto, Dr North needs to get a form from [email or web address] and send the completed form and a new prescription.  Wife called back asking the status of this form being filled out, and prescription sent. Please call her back, [phone]    The patient is Stable - Low risk of patient condition declining or worsening    Shift Goals  Clinical Goals: Manage withdrawl symptoms and improved WATs, tolerate HFNC  Patient Goals: n/a -- infant  Family Goals: POC update, keep patient comfortable and improve withdrawing symptoms    Progress made toward(s) clinical / shift goals:  Patient with improved withdrawal symptoms, patient did not require additional PRNs for bridging between weaning medications.    Problem: Respiratory  Goal: Patient will achieve/maintain optimum respiratory ventilation and gas exchange  Outcome: Progressing     Problem: Fluid Volume  Goal: Fluid volume balance will be maintained  Outcome: Progressing     Problem: Nutrition - Standard  Goal: Patient's nutritional and fluid intake will be adequate or improve  Outcome: Progressing     Problem: Urinary Elimination  Goal: Establish and maintain regular urinary output  Outcome: Progressing     Problem: Skin Integrity  Goal: Skin integrity is maintained or improved  Outcome: Progressing     Problem: Fall Risk  Goal: Patient will remain free from falls  Outcome: Progressing

## 2023-03-17 ENCOUNTER — OFFICE VISIT (OUTPATIENT)
Dept: FAMILY MEDICINE CLINIC | Facility: CLINIC | Age: 78
End: 2023-03-17
Payer: MEDICARE

## 2023-03-17 VITALS
WEIGHT: 222.06 LBS | OXYGEN SATURATION: 97 % | RESPIRATION RATE: 17 BRPM | BODY MASS INDEX: 31.79 KG/M2 | HEIGHT: 70 IN | TEMPERATURE: 98.1 F | HEART RATE: 96 BPM | SYSTOLIC BLOOD PRESSURE: 106 MMHG | DIASTOLIC BLOOD PRESSURE: 66 MMHG

## 2023-03-17 DIAGNOSIS — N32.81 OVERACTIVE BLADDER: ICD-10-CM

## 2023-03-17 DIAGNOSIS — E78.5 HYPERLIPIDEMIA LDL GOAL <70: ICD-10-CM

## 2023-03-17 DIAGNOSIS — K21.9 GASTROESOPHAGEAL REFLUX DISEASE WITHOUT ESOPHAGITIS: ICD-10-CM

## 2023-03-17 DIAGNOSIS — Z79.899 ENCOUNTER FOR LONG-TERM (CURRENT) USE OF OTHER MEDICATIONS: ICD-10-CM

## 2023-03-17 DIAGNOSIS — E78.2 MIXED HYPERLIPIDEMIA: ICD-10-CM

## 2023-03-17 DIAGNOSIS — E11.42 TYPE 2 DIABETES MELLITUS WITH PERIPHERAL NEUROPATHY: Primary | ICD-10-CM

## 2023-03-17 DIAGNOSIS — R53.83 OTHER FATIGUE: ICD-10-CM

## 2023-03-17 DIAGNOSIS — Z11.59 NEED FOR HEPATITIS C SCREENING TEST: ICD-10-CM

## 2023-03-17 DIAGNOSIS — I42.0 CARDIOMYOPATHY, DILATED: ICD-10-CM

## 2023-03-17 DIAGNOSIS — I51.89 DIASTOLIC DYSFUNCTION: ICD-10-CM

## 2023-03-17 DIAGNOSIS — D53.9 DEFICIENCY ANEMIA: ICD-10-CM

## 2023-03-17 DIAGNOSIS — I50.22 CHRONIC SYSTOLIC HEART FAILURE: ICD-10-CM

## 2023-03-17 PROCEDURE — 1160F RVW MEDS BY RX/DR IN RCRD: CPT | Performed by: FAMILY MEDICINE

## 2023-03-17 PROCEDURE — 99214 OFFICE O/P EST MOD 30 MIN: CPT | Performed by: FAMILY MEDICINE

## 2023-03-17 RX ORDER — GABAPENTIN 300 MG/1
300 CAPSULE ORAL NIGHTLY
Qty: 90 CAPSULE | Refills: 1 | Status: SHIPPED | OUTPATIENT
Start: 2023-03-17

## 2023-03-17 RX ORDER — GABAPENTIN 100 MG/1
100 CAPSULE ORAL 2 TIMES DAILY PRN
Qty: 180 CAPSULE | Refills: 1 | Status: SHIPPED | OUTPATIENT
Start: 2023-03-17

## 2023-03-17 RX ORDER — SACUBITRIL AND VALSARTAN 97; 103 MG/1; MG/1
1 TABLET, FILM COATED ORAL 2 TIMES DAILY
Qty: 180 TABLET | Refills: 1 | Status: SHIPPED | OUTPATIENT
Start: 2023-03-17

## 2023-03-17 NOTE — PROGRESS NOTES
"Chief Complaint  Follow-up (3 month follow)    Subjective      Joe Fung presents to [facility]  History of Present Illness  Patient is here for 3-month checkup on diabetes and multiple other issues.  Glucoses been running from , generally between 90 and 160 and doing much better.  He said he has gained a little weight, but has not had a bowel movement in a few days, although he denies any abdominal pain or loss of appetite.  He has not had any bad hypoglycemic episodes.  Denies any chest pain shortness of breath and his leg swelling is very well controlled.  He does say his neuropathy symptoms have been a bit worse lately, and in particular seems to wake up about 2 in the morning with his feet burning and tingling, so he would like to think about having a higher dose of gabapentin for bedtime  Objective   Vital Signs:   Vitals:    03/17/23 1512   BP: 106/66   BP Location: Left arm   Patient Position: Sitting   Cuff Size: Adult   Pulse: 96   Resp: 17   Temp: 98.1 °F (36.7 °C)   TempSrc: Oral   SpO2: 97%   Weight: 101 kg (222 lb 1 oz)   Height: 177.8 cm (70\")      /66 (BP Location: Left arm, Patient Position: Sitting, Cuff Size: Adult)   Pulse 96   Temp 98.1 °F (36.7 °C) (Oral)   Resp 17   Ht 177.8 cm (70\")   Wt 101 kg (222 lb 1 oz)   SpO2 97%   BMI 31.86 kg/m²     Body mass index is 31.86 kg/m².    Review of Systems   Constitutional: Negative for activity change, appetite change, chills, fever and unexpected weight loss.   HENT: Negative for ear discharge, ear pain, mouth sores, nosebleeds, rhinorrhea, sinus pressure, sore throat, swollen glands and trouble swallowing.    Eyes: Negative for blurred vision, double vision, pain, redness and visual disturbance.   Respiratory: Negative for cough, chest tightness, shortness of breath and wheezing.    Cardiovascular: Negative for chest pain, palpitations and leg swelling.        PND, orthopnea   Gastrointestinal: " Negative for abdominal distention, abdominal pain, blood in stool, constipation, diarrhea, nausea, vomiting and GERD.        Dysphagia, odynophagia   Endocrine: Negative for polydipsia, polyphagia and polyuria.   Genitourinary: Negative for difficulty urinating, dysuria, hematuria and urinary incontinence.   Musculoskeletal: Negative for arthralgias (unusual/atypica), back pain, gait problem, joint swelling, myalgias and neck pain.   Skin: Negative for rash, skin lesions (worrisome/suspicious), wound and bruise.   Allergic/Immunologic: Negative for food allergies.   Neurological: Positive for numbness. Negative for dizziness, tremors, seizures, syncope, weakness, light-headedness, headache and memory problem.   Hematological: Negative for adenopathy. Does not bruise/bleed easily.   Psychiatric/Behavioral: Negative for suicidal ideas and depressed mood. The patient is not nervous/anxious.        Past History:  Medical History: has a past medical history of Adenomatous polyp of colon, Bilateral exudative age-related macular degeneration (HCC), BMI 33.0-33.9,adult, BPH associated with nocturia, Bursitis of left elbow, Chronic fatigue, Chronic systolic heart failure ([facility]), CKD (chronic kidney disease), Diabetic retinopathy ([facility]), Diastolic dysfunction, Dilated cardiomyopathy ([facility]), Elevated lipids, Frequent falls, Insomnia, unspecified, Long term (current) use of insulin ([facility]), Mild episode of recurrent major depressive disorder ([facility]), Mixed hyperlipidemia, Nocturia, Other long term (current) drug therapy, Perennial allergic rhinitis, Peripheral polyneuropathy, Primary osteoarthritis involving multiple joints, Rotator cuff tear, Skin cancer, Small vessel coronary artery disease, Type 2 diabetes mellitus with diabetic neuropathy, with long-term current use of insulin ([facility]), and Vitamin D insufficiency.   Surgical History: has a past surgical history that includes Knee arthroscopy (Bilateral); Rotator cuff repair; Elbow  bursa surgery; and Cataract extraction.   Family History: family history includes Cancer in his mother; Diabetes in his maternal grandmother and mother; Heart disease in his maternal grandmother; Stroke in his mother.   Social History: reports that he has never smoked. He has never used smokeless tobacco. He reports current alcohol use. He reports that he does not use drugs.      Current Outpatient Medications:   •  Alpha-Lipoic Acid 600 MG tablet, Take  by mouth., Disp: , Rfl:   •  aspirin 81 MG chewable tablet, Chew 1 tablet Daily., Disp: , Rfl:   •  atorvastatin (LIPITOR) 20 MG tablet, TAKE 1 TABLET DAILY, Disp: 90 tablet, Rfl: 3  •  bumetanide (BUMEX) 2 MG tablet, bumetanide 2 mg tablet  Take 1 tablet every day by oral route in the morning., Disp: , Rfl:   •  cetirizine (zyrTEC) 10 MG tablet, Take 1 tablet by mouth Daily., Disp: , Rfl:   •  Cholecalciferol (Vitamin D3) 25 MCG (1000 UT) capsule, Take  by mouth., Disp: , Rfl:   •  Cinnamon 500 MG capsule, Take  by mouth., Disp: , Rfl:   •  coenzyme Q10 100 MG capsule, Take  by mouth., Disp: , Rfl:   •  Continuous Glucose Monitor Sup kit, 1 kit Every 14 (Fourteen) Days. Apply one sensor every 14 days as instructed, Disp: 6 kit, Rfl: 3  •  dapagliflozin Propanediol (Farxiga) 10 MG tablet, Take 10 mg by mouth Daily., Disp: 90 tablet, Rfl: 3  •  eszopiclone (LUNESTA) 2 MG tablet, Take 1 tablet by mouth Every Night. Take immediately before bedtime, Disp: 30 tablet, Rfl: 5  •  finasteride (PROSCAR) 5 MG tablet, TAKE 1 TABLET DAILY, Disp: 90 tablet, Rfl: 3  •  gabapentin (NEURONTIN) 100 MG capsule, Take 1 capsule by mouth 2 (Two) Times a Day As Needed (neuropathy)., Disp: 180 capsule, Rfl: 1  •  hydrALAZINE (APRESOLINE) 25 MG tablet, Take 1 tablet by mouth 2 (two) times a day., Disp: 180 tablet, Rfl: 3  •  Insulin Pen Needle (Droplet Pen Needles) 31G X 8 MM misc, Inject 1 each under the skin into the appropriate area as directed 2 (Two) Times a Day., Disp: 200 each, Rfl:  3  •  Lantus SoloStar 100 UNIT/ML injection pen, INJECT 40 UNITS            SUBCUTANEOUSLY EVERY       MORNING AND EVERY EVENING, Disp: 75 mL, Rfl: 3  •  meloxicam (MOBIC) 7.5 MG tablet, Take  by mouth Daily As Needed. 1-2 TABLETS WITH FOOD, Disp: , Rfl:   •  Mirabegron ER (MYRBETRIQ) 50 MG tablet sustained-release 24 hour 24 hr tablet, Myrbetriq 50 mg tablet,extended release  Take 1 tablet every day by oral route in the morning., Disp: , Rfl:   •  montelukast (SINGULAIR) 10 MG tablet, TAKE 1 TABLET EVERY EVENING, Disp: 90 tablet, Rfl: 3  •  multivitamin with minerals tablet tablet, Take 1 tablet by mouth Daily. WITH FOOD, Disp: , Rfl:   •  pantoprazole (PROTONIX) 40 MG EC tablet, TAKE 1 TABLET DAILY, Disp: 90 tablet, Rfl: 3  •  venlafaxine XR (EFFEXOR-XR) 37.5 MG 24 hr capsule, Take 1 capsule by mouth Daily., Disp: 90 capsule, Rfl: 3  •  vitamin B-12 (CYANOCOBALAMIN) 1000 MCG tablet, Take 1 tablet by mouth Daily., Disp: , Rfl:   •  vitamin E 100 UNIT capsule, Take  by mouth., Disp: , Rfl:   •  gabapentin (NEURONTIN) 300 MG capsule, Take 1 capsule by mouth Every Night., Disp: 90 capsule, Rfl: 1  •  metFORMIN (GLUCOPHAGE) 500 MG tablet, Take 1 tablet by mouth 2 (Two) Times a Day With Meals., Disp: 180 tablet, Rfl: 1  •  sacubitril-valsartan (Entresto)  MG tablet, Take 1 tablet by mouth 2 (Two) Times a Day., Disp: 180 tablet, Rfl: 1    Allergies: Lortab [hydrocodone-acetaminophen]    Physical Exam  Constitutional:       General: He is not in acute distress.     Appearance: He is obese. He is not toxic-appearing.   HENT:      Head: Normocephalic and atraumatic.      Right Ear: Ear canal and external ear normal.      Left Ear: Ear canal and external ear normal.      Nose: Nose normal.      Mouth/Throat:      Mouth: Mucous membranes are moist.      Pharynx: Oropharynx is clear.   Eyes:      General: No scleral icterus.     Extraocular Movements: Extraocular movements intact.      Conjunctiva/sclera: Conjunctivae  normal.      Pupils: Pupils are equal, round, and reactive to light.   Neck:      Vascular: No carotid bruit.   Cardiovascular:      Rate and Rhythm: Normal rate and regular rhythm.      Pulses: Normal pulses.      Heart sounds: Normal heart sounds.   Pulmonary:      Effort: Pulmonary effort is normal.      Breath sounds: Normal breath sounds.   Chest:      Chest wall: No tenderness.   Abdominal:      General: Bowel sounds are normal. There is no distension.      Palpations: Abdomen is soft. There is no mass.      Tenderness: There is no abdominal tenderness. There is no guarding or rebound.   Musculoskeletal:         General: No swelling or deformity. Normal range of motion.      Cervical back: Normal range of motion. No rigidity.      Right lower leg: No edema.      Left lower leg: No edema.   Lymphadenopathy:      Cervical: No cervical adenopathy.   Skin:     General: Skin is warm and dry.      Capillary Refill: Capillary refill takes less than 2 seconds.      Coloration: Skin is not pale.      Findings: No erythema or rash.   Neurological:      General: No focal deficit present.      Mental Status: He is alert and oriented to person, place, and time.      Cranial Nerves: No cranial nerve deficit.      Motor: No weakness.      Coordination: Coordination normal.      Gait: Gait normal.   Psychiatric:         Mood and Affect: Mood normal.         Behavior: Behavior normal.         Thought Content: Thought content normal.         Judgment: Judgment normal.                   Assessment and Plan   Diagnoses and all orders for this visit:    1. Type 2 diabetes mellitus with peripheral neuropathy (HCC) (Primary)  -     Hemoglobin A1c; Future  -     gabapentin (NEURONTIN) 100 MG capsule; Take 1 capsule by mouth 2 (Two) Times a Day As Needed (neuropathy).  Dispense: 180 capsule; Refill: 1  -     gabapentin (NEURONTIN) 300 MG capsule; Take 1 capsule by mouth Every Night.  Dispense: 90 capsule; Refill: 1  -     Hemoglobin  A1c  Glycemic control is excellent with current medications, including Farxiga 10 mg daily, metformin 500 mg twice a day, and Lantus.  We will check labs today, and if all goes well I can see him back in 6 months or sooner if needed  2. Hyperlipidemia LDL goal <70  -     Lipid Panel; Future  -     Lipid Panel    3. Cardiomyopathy, dilated (HCC)  This is well controlled with Farxiga 10 mg daily, hydralazine 25 mg twice a day, Entresto  twice a day, and Bumex 2 mg daily, comanaged with cardiology  4. Mixed hyperlipidemia    5. Encounter for long-term (current) use of other medications  -     CBC & Differential; Future  -     Comprehensive Metabolic Panel; Future  -     Magnesium; Future  -     CBC & Differential  -     Comprehensive Metabolic Panel  -     Magnesium    6. Chronic systolic heart failure (HCC)  This is well controlled with medicines as listed number item #3 above, no additional comments, doing well  7. Deficiency anemia    8. Overactive bladder    9. Gastroesophageal reflux disease without esophagitis    10. Diastolic dysfunction    11. Other fatigue  -     TSH+Free T4; Future  -     TSH+Free T4    12. Need for hepatitis C screening test  -     Hepatitis C Antibody; Future  -     Hepatitis C Antibody            Follow Up   Return in about 6 months (around 9/17/2023) for Recheck.  Patient was given instructions and counseling regarding his condition or for health maintenance advice. Please see specific information pulled into the AVS if appropriate.     Vimal Irene MD

## 2023-03-18 LAB
ALBUMIN SERPL-MCNC: 4.2 G/DL (ref 3.7–4.7)
ALBUMIN/GLOB SERPL: 1.8 {RATIO} (ref 1.2–2.2)
ALP SERPL-CCNC: 88 IU/L (ref 44–121)
ALT SERPL-CCNC: 14 IU/L (ref 0–44)
AST SERPL-CCNC: 17 IU/L (ref 0–40)
BASOPHILS # BLD AUTO: 0 X10E3/UL (ref 0–0.2)
BASOPHILS NFR BLD AUTO: 1 %
BILIRUB SERPL-MCNC: 1.3 MG/DL (ref 0–1.2)
BUN SERPL-MCNC: 25 MG/DL (ref 8–27)
BUN/CREAT SERPL: 26 (ref 10–24)
CALCIUM SERPL-MCNC: 8.8 MG/DL (ref 8.6–10.2)
CHLORIDE SERPL-SCNC: 107 MMOL/L (ref 96–106)
CHOLEST SERPL-MCNC: 106 MG/DL (ref 100–199)
CO2 SERPL-SCNC: 20 MMOL/L (ref 20–29)
CREAT SERPL-MCNC: 0.95 MG/DL (ref 0.76–1.27)
EGFRCR SERPLBLD CKD-EPI 2021: 83 ML/MIN/1.73
EOSINOPHIL # BLD AUTO: 0.1 X10E3/UL (ref 0–0.4)
EOSINOPHIL NFR BLD AUTO: 1 %
ERYTHROCYTE [DISTWIDTH] IN BLOOD BY AUTOMATED COUNT: 13.2 % (ref 11.6–15.4)
GLOBULIN SER CALC-MCNC: 2.3 G/DL (ref 1.5–4.5)
GLUCOSE SERPL-MCNC: 138 MG/DL (ref 70–99)
HBA1C MFR BLD: 5.9 % (ref 4.8–5.6)
HCT VFR BLD AUTO: 38.6 % (ref 37.5–51)
HCV IGG SERPL QL IA: NON REACTIVE
HDLC SERPL-MCNC: 33 MG/DL
HGB BLD-MCNC: 13.3 G/DL (ref 13–17.7)
IMM GRANULOCYTES # BLD AUTO: 0 X10E3/UL (ref 0–0.1)
IMM GRANULOCYTES NFR BLD AUTO: 0 %
LDLC SERPL CALC-MCNC: 40 MG/DL (ref 0–99)
LYMPHOCYTES # BLD AUTO: 2.4 X10E3/UL (ref 0.7–3.1)
LYMPHOCYTES NFR BLD AUTO: 27 %
MAGNESIUM SERPL-MCNC: 2.2 MG/DL (ref 1.6–2.3)
MCH RBC QN AUTO: 32 PG (ref 26.6–33)
MCHC RBC AUTO-ENTMCNC: 34.5 G/DL (ref 31.5–35.7)
MCV RBC AUTO: 93 FL (ref 79–97)
MONOCYTES # BLD AUTO: 0.6 X10E3/UL (ref 0.1–0.9)
MONOCYTES NFR BLD AUTO: 7 %
NEUTROPHILS # BLD AUTO: 5.7 X10E3/UL (ref 1.4–7)
NEUTROPHILS NFR BLD AUTO: 64 %
PLATELET # BLD AUTO: 199 X10E3/UL (ref 150–450)
POTASSIUM SERPL-SCNC: 4.7 MMOL/L (ref 3.5–5.2)
PROT SERPL-MCNC: 6.5 G/DL (ref 6–8.5)
RBC # BLD AUTO: 4.15 X10E6/UL (ref 4.14–5.8)
SODIUM SERPL-SCNC: 144 MMOL/L (ref 134–144)
T4 FREE SERPL-MCNC: 1.16 NG/DL (ref 0.82–1.77)
TRIGL SERPL-MCNC: 202 MG/DL (ref 0–149)
TSH SERPL DL<=0.005 MIU/L-ACNC: 1.66 UIU/ML (ref 0.45–4.5)
VLDLC SERPL CALC-MCNC: 33 MG/DL (ref 5–40)
WBC # BLD AUTO: 8.9 X10E3/UL (ref 3.4–10.8)

## 2023-03-23 DIAGNOSIS — F51.01 PRIMARY INSOMNIA: ICD-10-CM

## 2023-03-23 RX ORDER — ESZOPICLONE 1 MG/1
TABLET, FILM COATED ORAL
Qty: 30 TABLET | OUTPATIENT
Start: 2023-03-23

## 2023-03-23 NOTE — TELEPHONE ENCOUNTER
Caller: KYREE RAMIREZ    Relationship to patient: Emergency Contact    Best call back number: [phone]    Patient is needing: KYREE RAMIREZ CALLED TO ADVISE PATIENT NEEDS TO HAVE THE ESZOPICLONE 1MG PRESCRIPTION    KYREE RAMIREZ WOULD LIKE THIS CALLED IN TO:     [facility] [id number] [facility] [address] - [phone]  - [phone] FX

## 2023-03-28 NOTE — TELEPHONE ENCOUNTER
Pt contacted the pt says that he actually both scripts and you already knew this??   Says he sometimes switches back and forth?

## 2023-04-03 ENCOUNTER — TELEPHONE (OUTPATIENT)
Dept: FAMILY MEDICINE CLINIC | Facility: CLINIC | Age: 78
End: 2023-04-03
Payer: MEDICARE

## 2023-04-03 NOTE — TELEPHONE ENCOUNTER
Patients wife called and stated that  has fever,chills,chest pain on left side,weak and can barely walk.     Spoke with ,  had advised they go to the ER with those symptoms because of the health issues the patient has. Dr. Irene said he needs a full work up with his health issues and those symptoms at the ED.    Wife is upset that  will not see him and advising him to go to ER.    Wife said they are not going to the ER but will be taking him to doctor elsewhere but not ER.

## 2023-05-04 ENCOUNTER — OFFICE VISIT (OUTPATIENT)
Dept: FAMILY MEDICINE CLINIC | Facility: CLINIC | Age: 78
End: 2023-05-04
Payer: MEDICARE

## 2023-05-04 VITALS
DIASTOLIC BLOOD PRESSURE: 64 MMHG | RESPIRATION RATE: 17 BRPM | SYSTOLIC BLOOD PRESSURE: 112 MMHG | OXYGEN SATURATION: 100 % | BODY MASS INDEX: 31.86 KG/M2 | HEART RATE: 89 BPM | HEIGHT: 70 IN

## 2023-05-04 DIAGNOSIS — I50.22 CHRONIC SYSTOLIC HEART FAILURE: ICD-10-CM

## 2023-05-04 DIAGNOSIS — S40.811A: ICD-10-CM

## 2023-05-04 DIAGNOSIS — I10 ESSENTIAL HYPERTENSION: ICD-10-CM

## 2023-05-04 DIAGNOSIS — I42.0 CARDIOMYOPATHY, DILATED: ICD-10-CM

## 2023-05-04 DIAGNOSIS — I95.1 ORTHOSTATIC HYPOTENSION: Primary | ICD-10-CM

## 2023-05-04 DIAGNOSIS — S40.812A ABRASION OF LEFT UPPER EXTREMITY, INITIAL ENCOUNTER: ICD-10-CM

## 2023-05-04 PROBLEM — E66.9 OBESITY WITH BODY MASS INDEX 30 OR GREATER: Status: ACTIVE | Noted: 2021-07-01

## 2023-05-04 PROBLEM — H54.3 UNQUALIFIED VISUAL LOSS, BOTH EYES: Status: ACTIVE | Noted: 2022-07-22

## 2023-05-04 PROBLEM — Z96.1 PSEUDOPHAKIA OF BOTH EYES: Status: ACTIVE | Noted: 2022-07-22

## 2023-05-04 PROBLEM — H52.4 PRESBYOPIA OF BOTH EYES: Status: ACTIVE | Noted: 2022-07-22

## 2023-05-04 PROBLEM — H52.223 REGULAR ASTIGMATISM OF BOTH EYES: Status: ACTIVE | Noted: 2022-07-22

## 2023-05-04 PROBLEM — H35.3130 BILATERAL NONEXUDATIVE AGE-RELATED MACULAR DEGENERATION: Status: ACTIVE | Noted: 2022-07-22

## 2023-05-04 RX ORDER — BUMETANIDE 1 MG/1
TABLET ORAL
COMMUNITY
Start: 2023-03-18 | End: 2023-05-04

## 2023-05-04 RX ORDER — CEPHRADINE 500 MG
CAPSULE ORAL
COMMUNITY
End: 2023-05-04

## 2023-05-04 RX ORDER — HYDRALAZINE HYDROCHLORIDE 10 MG/1
10 TABLET, FILM COATED ORAL 2 TIMES DAILY
Qty: 60 TABLET | Refills: 1 | Status: SHIPPED | OUTPATIENT
Start: 2023-05-04

## 2023-05-04 RX ORDER — ZINC GLUCONATE 50 MG
TABLET ORAL
COMMUNITY

## 2023-05-04 RX ORDER — SULFAMETHOXAZOLE AND TRIMETHOPRIM 800; 160 MG/1; MG/1
TABLET ORAL
COMMUNITY
Start: 2023-04-03 | End: 2023-05-04

## 2023-05-04 RX ORDER — TAMSULOSIN HYDROCHLORIDE 0.4 MG/1
CAPSULE ORAL
COMMUNITY
End: 2023-05-04

## 2023-05-04 RX ORDER — TORSEMIDE 10 MG/1
TABLET ORAL
COMMUNITY
End: 2023-05-04

## 2023-05-04 RX ORDER — FLUOXETINE 10 MG/1
TABLET, FILM COATED ORAL
COMMUNITY
End: 2023-05-04

## 2023-05-04 RX ORDER — ASCORBIC ACID 500 MG
TABLET ORAL
COMMUNITY

## 2023-05-04 NOTE — PROGRESS NOTES
.Chief Complaint  Fall (Had a fall yesterday. He has some cuts on his arms.), Loss of Consciousness (He had a near syncope episode today. He slumped over and nearly fell again. He is unsure if he passed out with his fall, yesterday.), Dizziness (Has had some dizziness and he loses some vision and feels like he may pass out.), and Hypotension (Patient has had some low BP reading over several days.)    Subjective          History of Present Illness  Joe Fung is here today with his wife.  Patient and wife state that he has days where he is feeling off and feels more lightheaded and like he is passing out.  He states that he is done this for a couple of days over the past few months.  He usually notices this happening when he stands up quickly.  His wife has taken sitting and standing blood pressures that demonstrate that his blood pressures drop significantly.  Yesterday he stood up and got dizzy and fell.  He managed to skin both forearms a bit.  She states that he was standing at the kitchen counter today and started to slip down the counter.    Home BP sitting to standing  104/64 (85) then 77/54 (96) standing    113/62 (83) 104/71 (90) standing    Patient states that he occasionally gets a chest pain in his right breast.  He states that he can touch it with his finger and is unsure if it is a bruise.  He states that he has had some dehydration in the past.  He states that he pees multiple times a day.  He states he only drinks 1 to 2 cups of coffee a day and has been concentrating on drinking more fluids.    He sees  at [facility] cardiology in [place].  Last time they saw him was 4 to 6 months ago.  His cardiologist said that his fraction is good and put him on a longer timeframe to follow-up.  He told that the meds are working and that he would stay a bit more tired.    Objective   Vital Signs:   /64 (BP Location: Left arm, Patient Position: Sitting, Cuff Size: Adult)   Pulse 89   Resp  "17   Ht 177.8 cm (70\")   SpO2 100%   BMI 31.86 kg/m²     Body mass index is 31.86 kg/m².      Review of Systems      Current Outpatient Medications:   •  Alpha-Lipoic Acid 600 MG tablet, Take  by mouth., Disp: , Rfl:   •  ascorbic acid (VITAMIN C) 500 MG tablet, Take 1 tablet every day by oral route., Disp: , Rfl:   •  aspirin 81 MG chewable tablet, Chew 1 tablet Daily., Disp: , Rfl:   •  atorvastatin (LIPITOR) 20 MG tablet, TAKE 1 TABLET DAILY, Disp: 90 tablet, Rfl: 3  •  bumetanide (BUMEX) 2 MG tablet, bumetanide 2 mg tablet  Take 1 tablet every day by oral route in the morning., Disp: , Rfl:   •  cetirizine (zyrTEC) 10 MG tablet, Take 1 tablet by mouth Daily., Disp: , Rfl:   •  Cinnamon 500 MG capsule, Take  by mouth., Disp: , Rfl:   •  coenzyme Q10 100 MG capsule, Take  by mouth., Disp: , Rfl:   •  Continuous Glucose Monitor Sup kit, 1 kit Every 14 (Fourteen) Days. Apply one sensor every 14 days as instructed, Disp: 6 kit, Rfl: 3  •  dapagliflozin Propanediol (Farxiga) 10 MG tablet, Take 10 mg by mouth Daily., Disp: 90 tablet, Rfl: 3  •  finasteride (PROSCAR) 5 MG tablet, TAKE 1 TABLET DAILY, Disp: 90 tablet, Rfl: 3  •  gabapentin (NEURONTIN) 100 MG capsule, Take 1 capsule by mouth 2 (Two) Times a Day As Needed (neuropathy)., Disp: 180 capsule, Rfl: 1  •  Insulin Pen Needle (Droplet Pen Needles) 31G X 8 MM misc, Inject 1 each under the skin into the appropriate area as directed 2 (Two) Times a Day., Disp: 200 each, Rfl: 3  •  Lantus SoloStar 100 UNIT/ML injection pen, INJECT 40 UNITS            SUBCUTANEOUSLY EVERY       MORNING AND EVERY EVENING, Disp: 75 mL, Rfl: 3  •  Mirabegron ER (MYRBETRIQ) 50 MG tablet sustained-release 24 hour 24 hr tablet, Myrbetriq 50 mg tablet,extended release  Take 1 tablet every day by oral route in the morning., Disp: , Rfl:   •  montelukast (SINGULAIR) 10 MG tablet, TAKE 1 TABLET EVERY EVENING, Disp: 90 tablet, Rfl: 3  •  multivitamin with minerals tablet tablet, Take 1 tablet " by mouth Daily. WITH FOOD, Disp: , Rfl:   •  pantoprazole (PROTONIX) 40 MG EC tablet, TAKE 1 TABLET DAILY, Disp: 90 tablet, Rfl: 3  •  sacubitril-valsartan (Entresto)  MG tablet, Take 1 tablet by mouth 2 (Two) Times a Day., Disp: 180 tablet, Rfl: 1  •  venlafaxine XR (EFFEXOR-XR) 37.5 MG 24 hr capsule, Take 1 capsule by mouth Daily., Disp: 90 capsule, Rfl: 3  •  Zinc 50 MG tablet, Take  by mouth., Disp: , Rfl:   •  hydrALAZINE (APRESOLINE) 10 MG tablet, Take 1 tablet by mouth 2 (Two) Times a Day., Disp: 60 tablet, Rfl: 1  •  metFORMIN (GLUCOPHAGE) 500 MG tablet, Take 1 tablet by mouth 2 (Two) Times a Day With Meals., Disp: 180 tablet, Rfl: 1    Allergies: Lortab [hydrocodone-acetaminophen]    Physical Exam  Vitals and nursing note reviewed.   Constitutional:       General: He is not in acute distress.     Appearance: Normal appearance. He is normal weight. He is not ill-appearing, toxic-appearing or diaphoretic.   HENT:      Head: Normocephalic and atraumatic.      Right Ear: Tympanic membrane, ear canal and external ear normal.      Left Ear: Tympanic membrane, ear canal and external ear normal.      Nose: Nose normal.      Mouth/Throat:      Mouth: Mucous membranes are moist.   Eyes:      Pupils: Pupils are equal, round, and reactive to light.   Cardiovascular:      Rate and Rhythm: Normal rate and regular rhythm.      Comments: Heart sounds distant  Pulmonary:      Effort: Pulmonary effort is normal.      Breath sounds: Normal breath sounds.   Skin:     Comments: Few small scrapes on both forearms healing well   Neurological:      General: No focal deficit present.      Mental Status: He is alert.   Psychiatric:         Mood and Affect: Mood normal.          Result Review :                   Assessment and Plan    Diagnoses and all orders for this visit:    1. Orthostatic hypotension (Primary)  -     hydrALAZINE (APRESOLINE) 10 MG tablet; Take 1 tablet by mouth 2 (Two) Times a Day.  Dispense: 60 tablet;  Refill: 1  Discussed case with PCP Dr. Irene.  With his decreased blood pressure will decrease his hydralazine from 25 mg twice a day to 10 mg twice a day.  We will have him continue to monitor his blood pressure.  We also would have him call his cardiologist to check in with him and see if he has any other suggestions with his medications.  Otherwise we will keep his medical other medications the same  2. Chronic systolic heart failure    3. Cardiomyopathy, dilated    4. Essential hypertension    5. Abrasion of upper extremity bilateral initial encounter - healing well routine care-- rebandaged in office    Follow Up   No follow-ups on file.  Patient was given instructions and counseling regarding his condition or for health maintenance advice. Please see specific information pulled into the AVS if appropriate.     SKINNY Andino  05/04/2023

## 2023-06-16 DIAGNOSIS — E11.42 TYPE 2 DIABETES MELLITUS WITH PERIPHERAL NEUROPATHY: ICD-10-CM

## 2023-06-16 RX ORDER — VENLAFAXINE HYDROCHLORIDE 37.5 MG/1
CAPSULE, EXTENDED RELEASE ORAL
Qty: 90 CAPSULE | Refills: 3 | Status: SHIPPED | OUTPATIENT
Start: 2023-06-16

## 2023-06-16 RX ORDER — GABAPENTIN 300 MG/1
CAPSULE ORAL
Qty: 90 CAPSULE | OUTPATIENT
Start: 2023-06-16

## 2023-06-16 NOTE — TELEPHONE ENCOUNTER
This medicine is not on his active medicine list. Pt told me in past that 300mg caused too many side effects, so not sure why this is being requested.

## 2023-09-09 DIAGNOSIS — E11.42 TYPE 2 DIABETES MELLITUS WITH PERIPHERAL NEUROPATHY: ICD-10-CM

## 2023-09-11 RX ORDER — GABAPENTIN 300 MG/1
CAPSULE ORAL
Qty: 90 CAPSULE | Refills: 5 | Status: SHIPPED | OUTPATIENT
Start: 2023-09-11

## 2023-09-11 RX ORDER — GABAPENTIN 100 MG/1
CAPSULE ORAL
Qty: 180 CAPSULE | Refills: 5 | Status: SHIPPED | OUTPATIENT
Start: 2023-09-11

## 2023-09-18 ENCOUNTER — OFFICE VISIT (OUTPATIENT)
Dept: FAMILY MEDICINE CLINIC | Facility: CLINIC | Age: 78
End: 2023-09-18
Payer: MEDICARE

## 2023-09-18 VITALS
SYSTOLIC BLOOD PRESSURE: 120 MMHG | DIASTOLIC BLOOD PRESSURE: 82 MMHG | HEIGHT: 69 IN | OXYGEN SATURATION: 99 % | HEART RATE: 66 BPM | BODY MASS INDEX: 33.49 KG/M2 | WEIGHT: 226.1 LBS

## 2023-09-18 DIAGNOSIS — I10 ESSENTIAL HYPERTENSION: ICD-10-CM

## 2023-09-18 DIAGNOSIS — N40.1 LOWER URINARY TRACT SYMPTOMS DUE TO BENIGN PROSTATIC HYPERPLASIA: ICD-10-CM

## 2023-09-18 DIAGNOSIS — R29.6 FREQUENT FALLS: ICD-10-CM

## 2023-09-18 DIAGNOSIS — R53.83 OTHER FATIGUE: ICD-10-CM

## 2023-09-18 DIAGNOSIS — Z79.899 ENCOUNTER FOR LONG-TERM (CURRENT) USE OF OTHER MEDICATIONS: ICD-10-CM

## 2023-09-18 DIAGNOSIS — M19.042 PRIMARY OSTEOARTHRITIS OF BOTH HANDS: ICD-10-CM

## 2023-09-18 DIAGNOSIS — E11.42 TYPE 2 DIABETES MELLITUS WITH PERIPHERAL NEUROPATHY: Primary | ICD-10-CM

## 2023-09-18 DIAGNOSIS — R26.89 IMPAIRMENT OF BALANCE: ICD-10-CM

## 2023-09-18 DIAGNOSIS — N39.41 URGE INCONTINENCE OF URINE: ICD-10-CM

## 2023-09-18 DIAGNOSIS — M17.0 PRIMARY OSTEOARTHRITIS OF BOTH KNEES: ICD-10-CM

## 2023-09-18 DIAGNOSIS — M16.0 PRIMARY OSTEOARTHRITIS OF BOTH HIPS: ICD-10-CM

## 2023-09-18 DIAGNOSIS — M19.041 PRIMARY OSTEOARTHRITIS OF BOTH HANDS: ICD-10-CM

## 2023-09-18 DIAGNOSIS — K21.9 GASTROESOPHAGEAL REFLUX DISEASE WITHOUT ESOPHAGITIS: ICD-10-CM

## 2023-09-18 DIAGNOSIS — D53.9 DEFICIENCY ANEMIA: ICD-10-CM

## 2023-09-18 DIAGNOSIS — R53.1 GENERALIZED WEAKNESS: ICD-10-CM

## 2023-09-18 DIAGNOSIS — N32.81 OVERACTIVE BLADDER: ICD-10-CM

## 2023-09-18 DIAGNOSIS — I50.22 CHRONIC SYSTOLIC HEART FAILURE: ICD-10-CM

## 2023-09-18 DIAGNOSIS — E78.5 HYPERLIPIDEMIA LDL GOAL <70: ICD-10-CM

## 2023-09-18 RX ORDER — ATORVASTATIN CALCIUM 20 MG/1
20 TABLET, FILM COATED ORAL DAILY
Qty: 90 TABLET | Refills: 3 | Status: SHIPPED | OUTPATIENT
Start: 2023-09-18

## 2023-09-18 RX ORDER — LIDOCAINE 50 MG/G
1 OINTMENT TOPICAL
Qty: 240 G | Refills: 11 | Status: SHIPPED | OUTPATIENT
Start: 2023-09-18

## 2023-09-18 RX ORDER — MONTELUKAST SODIUM 10 MG/1
10 TABLET ORAL EVERY EVENING
Qty: 90 TABLET | Refills: 3 | Status: SHIPPED | OUTPATIENT
Start: 2023-09-18

## 2023-09-18 RX ORDER — PANTOPRAZOLE SODIUM 40 MG/1
40 TABLET, DELAYED RELEASE ORAL DAILY
Qty: 90 TABLET | Refills: 3 | Status: SHIPPED | OUTPATIENT
Start: 2023-09-18

## 2023-09-18 RX ORDER — CARVEDILOL 6.25 MG/1
1 TABLET ORAL 2 TIMES DAILY WITH MEALS
COMMUNITY

## 2023-09-18 RX ORDER — INSULIN GLARGINE 100 [IU]/ML
40 INJECTION, SOLUTION SUBCUTANEOUS NIGHTLY
Qty: 75 ML | Refills: 3 | Status: SHIPPED | OUTPATIENT
Start: 2023-09-18

## 2023-09-18 RX ORDER — FINASTERIDE 5 MG/1
5 TABLET, FILM COATED ORAL DAILY
Qty: 90 TABLET | Refills: 3 | Status: SHIPPED | OUTPATIENT
Start: 2023-09-18

## 2023-09-18 RX ORDER — PEN NEEDLE, DIABETIC 31 GX5/16"
1 NEEDLE, DISPOSABLE MISCELLANEOUS 2 TIMES DAILY
Qty: 200 EACH | Refills: 3 | Status: SHIPPED | OUTPATIENT
Start: 2023-09-18

## 2023-09-18 RX ORDER — DAPAGLIFLOZIN 10 MG/1
10 TABLET, FILM COATED ORAL DAILY
Qty: 90 TABLET | Refills: 3 | Status: SHIPPED | OUTPATIENT
Start: 2023-09-18 | End: 2023-09-18 | Stop reason: SDUPTHER

## 2023-09-18 RX ORDER — DAPAGLIFLOZIN 10 MG/1
10 TABLET, FILM COATED ORAL DAILY
Qty: 90 TABLET | Refills: 3 | Status: SHIPPED | OUTPATIENT
Start: 2023-09-18

## 2023-09-18 NOTE — PROGRESS NOTES
"Chief Complaint  Diabetes    Subjective      Joe Fung presents to [facility]  History of Present Illness  Patient is here for 6-month checkup on diabetes and multiple other problems.  His home blood sugar readings have been satisfactory, and his blood pressure readings have been good 2.  His cardiologist discontinued the Entresto because his blood pressure was getting too low.  Unfortunately, the patient continues to have significant impairment of balance which is multifactorial, related to generalized weakness, osteoarthritis of the knees and hips, and peripheral neuropathy from his diabetes.  As a result, he does continue to have frequent falls, even with appropriate safety precautions.  In the past he is declined to do physical therapy, but he now says that he is willing to do that.  The patient does not drive due to his medical conditions and impaired vision, and his wife works full-time, so he is essentially homebound and will have to arrange for home health consultation to get physical therapy and possibly Occupational Therapy in the home.    Patient has ongoing painful peripheral diabetic neuropathy, and he says over the last week his left foot pain has been worse.  He has tried taking a higher dose of gabapentin in the past but it made him too drowsy and woozy, and he fell even more.  Therefore, he would like to try some type of topical analgesic.  Objective   Vital Signs:   Vitals:    09/18/23 1544   BP: 120/82   BP Location: Left arm   Patient Position: Sitting   Cuff Size: Adult   Pulse: 66   SpO2: 99%   Weight: 103 kg (226 lb 1.6 oz)   Height: 175.3 cm (69\")      /82 (BP Location: Left arm, Patient Position: Sitting, Cuff Size: Adult)   Pulse 66   Ht 175.3 cm (69\")   Wt 103 kg (226 lb 1.6 oz)   SpO2 99%   BMI 33.39 kg/m²     Body mass index is 33.39 kg/m².    Review of Systems   Constitutional:  Positive for fatigue. Negative for chills, fever and " unexpected weight loss.   HENT:  Negative for ear discharge, ear pain, mouth sores, nosebleeds, rhinorrhea, sinus pressure, sore throat, swollen glands, trouble swallowing and voice change.    Eyes:  Negative for blurred vision, double vision, pain, redness and visual disturbance.   Respiratory:  Negative for cough, shortness of breath and wheezing.    Cardiovascular:  Negative for chest pain, palpitations and leg swelling.        PND, orthopnea   Gastrointestinal:  Negative for abdominal distention, abdominal pain, blood in stool, constipation, diarrhea, nausea, vomiting and GERD.        Dysphagia, odynophagia   Endocrine: Negative for polydipsia, polyphagia and polyuria.   Genitourinary:  Positive for urgency and urinary incontinence. Negative for dysuria and hematuria.   Musculoskeletal:  Positive for gait problem. Negative for arthralgias (unusual/atypica), joint swelling, myalgias and neck pain.   Skin:  Negative for rash, skin lesions (worrisome/suspicious), wound and bruise.   Allergic/Immunologic: Negative for food allergies.   Neurological:  Positive for dizziness, weakness, light-headedness and numbness. Negative for tremors, seizures, syncope, speech difficulty and headache.   Hematological:  Negative for adenopathy. Does not bruise/bleed easily.   Psychiatric/Behavioral:  Negative for suicidal ideas and depressed mood. The patient is not nervous/anxious.      Past History:  Medical History: has a past medical history of Adenomatous polyp of colon, Bilateral exudative age-related macular degeneration, BMI 33.0-33.9,adult, BPH associated with nocturia, Bursitis of left elbow, Chronic fatigue, Chronic systolic heart failure, CKD (chronic kidney disease), Diabetic retinopathy, Diastolic dysfunction, Dilated cardiomyopathy, Elevated lipids, Frequent falls, Insomnia, unspecified, Long term (current) use of insulin, Mild episode of recurrent major depressive disorder, Mixed hyperlipidemia, Nocturia, Other long  term (current) drug therapy, Perennial allergic rhinitis, Peripheral polyneuropathy, Primary osteoarthritis involving multiple joints, Rotator cuff tear, Skin cancer, Small vessel coronary artery disease, Type 2 diabetes mellitus with diabetic neuropathy, with long-term current use of insulin, and Vitamin D insufficiency.   Surgical History: has a past surgical history that includes Knee arthroscopy (Bilateral); Rotator cuff repair; Elbow bursa surgery; and Cataract extraction.   Family History: family history includes Cancer in his mother; Diabetes in his maternal grandmother and mother; Heart disease in his maternal grandmother; Stroke in his mother.   Social History: reports that he has never smoked. He has never used smokeless tobacco. He reports current alcohol use. He reports that he does not use drugs.      Current Outpatient Medications:     Alpha-Lipoic Acid 600 MG tablet, Take  by mouth., Disp: , Rfl:     ascorbic acid (VITAMIN C) 500 MG tablet, Take 1 tablet every day by oral route., Disp: , Rfl:     aspirin 81 MG chewable tablet, Chew 1 tablet Daily., Disp: , Rfl:     atorvastatin (LIPITOR) 20 MG tablet, Take 1 tablet by mouth Daily., Disp: 90 tablet, Rfl: 3    bumetanide (BUMEX) 2 MG tablet, Take 1 tablet by mouth Daily. Prn, Disp: , Rfl:     cetirizine (zyrTEC) 10 MG tablet, Take 1 tablet by mouth Daily., Disp: , Rfl:     Cinnamon 500 MG capsule, Take  by mouth., Disp: , Rfl:     coenzyme Q10 100 MG capsule, Take  by mouth., Disp: , Rfl:     Continuous Glucose Monitor Sup kit, 1 kit Every 14 (Fourteen) Days. Apply one sensor every 14 days as instructed, Disp: 6 kit, Rfl: 3    dapagliflozin Propanediol (Farxiga) 10 MG tablet, Take 10 mg by mouth Daily., Disp: 90 tablet, Rfl: 3    finasteride (PROSCAR) 5 MG tablet, Take 1 tablet by mouth Daily., Disp: 90 tablet, Rfl: 3    gabapentin (NEURONTIN) 100 MG capsule, TAKE ONE CAPSULE BY MOUTH TWICE A DAY AS NEEDED, Disp: 180 capsule, Rfl: 5    gabapentin  (NEURONTIN) 300 MG capsule, TAKE ONE CAPSULE BY MOUTH ONCE NIGHTLY, Disp: 90 capsule, Rfl: 5    hydrALAZINE (APRESOLINE) 10 MG tablet, Take 1 tablet by mouth 2 (Two) Times a Day., Disp: 60 tablet, Rfl: 1    Insulin Glargine (Lantus SoloStar) 100 UNIT/ML injection pen, Inject 40 Units under the skin into the appropriate area as directed Every Night., Disp: 75 mL, Rfl: 3    Insulin Pen Needle (Droplet Pen Needles) 31G X 8 MM misc, Inject 1 each under the skin into the appropriate area as directed 2 (Two) Times a Day., Disp: 200 each, Rfl: 3    metFORMIN (GLUCOPHAGE) 500 MG tablet, Take 1 tablet by mouth 2 (Two) Times a Day With Meals., Disp: 180 tablet, Rfl: 1    Mirabegron ER (MYRBETRIQ) 50 MG tablet sustained-release 24 hour 24 hr tablet, Myrbetriq 50 mg tablet,extended release  Take 1 tablet every day by oral route in the morning., Disp: , Rfl:     montelukast (SINGULAIR) 10 MG tablet, Take 1 tablet by mouth Every Evening., Disp: 90 tablet, Rfl: 3    multivitamin with minerals tablet tablet, Take 1 tablet by mouth Daily. WITH FOOD, Disp: , Rfl:     pantoprazole (PROTONIX) 40 MG EC tablet, Take 1 tablet by mouth Daily., Disp: 90 tablet, Rfl: 3    venlafaxine XR (EFFEXOR-XR) 37.5 MG 24 hr capsule, TAKE 1 CAPSULE DAILY, Disp: 90 capsule, Rfl: 3    carvedilol (COREG) 6.25 MG tablet, Take 1 tablet by mouth 2 (Two) Times a Day With Meals., Disp: , Rfl:     lidocaine (XYLOCAINE) 5 % ointment, Apply 1 application  topically to the appropriate area as directed Every 2 (Two) Hours As Needed for Mild Pain or Moderate Pain (for neuropathy pain)., Disp: 240 g, Rfl: 11    Allergies: Lortab [hydrocodone-acetaminophen]    Physical Exam  Constitutional:       General: He is not in acute distress.     Appearance: He is obese. He is not toxic-appearing.   HENT:      Head: Normocephalic and atraumatic.      Right Ear: Ear canal and external ear normal.      Left Ear: Ear canal and external ear normal.      Nose: Nose normal.       Mouth/Throat:      Mouth: Mucous membranes are moist.      Pharynx: Oropharynx is clear.   Eyes:      General: No scleral icterus.     Extraocular Movements: Extraocular movements intact.      Conjunctiva/sclera: Conjunctivae normal.      Pupils: Pupils are equal, round, and reactive to light.   Neck:      Vascular: No carotid bruit.   Cardiovascular:      Rate and Rhythm: Normal rate and regular rhythm.      Pulses: Normal pulses.           Dorsalis pedis pulses are 1+ on the right side and 1+ on the left side.        Posterior tibial pulses are 1+ on the right side and 1+ on the left side.      Heart sounds: Normal heart sounds.   Pulmonary:      Effort: Pulmonary effort is normal.      Breath sounds: Normal breath sounds.   Chest:      Chest wall: No tenderness.   Abdominal:      General: Bowel sounds are normal. There is no distension.      Palpations: Abdomen is soft. There is no mass.      Tenderness: There is no abdominal tenderness. There is no guarding or rebound.   Musculoskeletal:         General: No swelling or deformity. Normal range of motion.      Cervical back: Normal range of motion. No rigidity.      Right lower leg: No edema.      Left lower leg: No edema.      Right foot: No deformity.      Left foot: No deformity.   Feet:      Right foot:      Protective Sensation: 5 sites tested.   1 site sensed.     Skin integrity: Skin integrity normal.      Left foot:      Protective Sensation: 5 sites tested.  2 sites sensed.      Skin integrity: Skin integrity normal.      Comments:      Lymphadenopathy:      Cervical: No cervical adenopathy.   Skin:     General: Skin is warm and dry.      Capillary Refill: Capillary refill takes less than 2 seconds.      Coloration: Skin is not pale.      Findings: No erythema or rash.   Neurological:      General: No focal deficit present.      Mental Status: He is alert and oriented to person, place, and time.      Cranial Nerves: No cranial nerve deficit.       Coordination: Coordination normal.      Gait: Gait abnormal (Impairment of balance, uses cane).   Psychiatric:         Mood and Affect: Mood normal.         Behavior: Behavior normal.         Thought Content: Thought content normal.         Judgment: Judgment normal.                 Assessment and Plan   Diagnoses and all orders for this visit:    1. Type 2 diabetes mellitus with peripheral neuropathy (Primary)  -     Hemoglobin A1c  Diabetes appears to be well controlled with current medications including Farxiga 10 mg daily, Lantus 40 units nightly, and metformin 5 mg twice a day, and he will continue.  We will check labs today.  2. Hyperlipidemia LDL goal <70  -     Lipid Panel    3. Lower urinary tract symptoms due to benign prostatic hyperplasia    4. Encounter for long-term (current) use of other medications  -     CBC & Differential  -     Comprehensive Metabolic Panel  -     Magnesium    5. Chronic systolic heart failure  His cardiologist stopped Entresto due to hypotension, and the patient appears to be stable on current treatment including carvedilol 6.25 mg twice a day, Farxiga 10 mg daily, and Bumex 2 mg daily as needed swelling or fluid retention.  6. Deficiency anemia  -     Vitamin B12  -     Ferritin  -     Iron  -     Folate    7. Primary osteoarthritis of both hands    8. Gastroesophageal reflux disease without esophagitis    9. Overactive bladder  This problem has improved with Myrbetriq 50 mg daily and he will continue  10. Urge incontinence of urine  This problem has improved with Myrbetriq 50 mg daily and he will continue  11. Essential hypertension    12. Other fatigue  -     TSH+Free T4    13. Impairment of balance  -     Ambulatory Referral to Home Health  The patient is homebound during the day, and only gets out of the home in the evenings when his wife gets home from work and can take him places, which she cannot do during the day because she works full-time.  Therefore we will consult  home health and see what therapy services can be provided for him to help with his generalized weakness and impairment of gait with frequent falls.  14. Frequent falls  -     Ambulatory Referral to Home Health    15. Generalized weakness  -     Ambulatory Referral to Home Health    16. Primary osteoarthritis of both knees    17. Primary osteoarthritis of both hips    Other orders  -     lidocaine (XYLOCAINE) 5 % ointment; Apply 1 application  topically to the appropriate area as directed Every 2 (Two) Hours As Needed for Mild Pain or Moderate Pain (for neuropathy pain).  Dispense: 240 g; Refill: 11  -     atorvastatin (LIPITOR) 20 MG tablet; Take 1 tablet by mouth Daily.  Dispense: 90 tablet; Refill: 3  -     Discontinue: dapagliflozin Propanediol (Farxiga) 10 MG tablet; Take 10 mg by mouth Daily.  Dispense: 90 tablet; Refill: 3  -     finasteride (PROSCAR) 5 MG tablet; Take 1 tablet by mouth Daily.  Dispense: 90 tablet; Refill: 3  -     Insulin Pen Needle (Droplet Pen Needles) 31G X 8 MM misc; Inject 1 each under the skin into the appropriate area as directed 2 (Two) Times a Day.  Dispense: 200 each; Refill: 3  -     Insulin Glargine (Lantus SoloStar) 100 UNIT/ML injection pen; Inject 40 Units under the skin into the appropriate area as directed Every Night.  Dispense: 75 mL; Refill: 3  -     metFORMIN (GLUCOPHAGE) 500 MG tablet; Take 1 tablet by mouth 2 (Two) Times a Day With Meals.  Dispense: 180 tablet; Refill: 1  -     montelukast (SINGULAIR) 10 MG tablet; Take 1 tablet by mouth Every Evening.  Dispense: 90 tablet; Refill: 3  -     pantoprazole (PROTONIX) 40 MG EC tablet; Take 1 tablet by mouth Daily.  Dispense: 90 tablet; Refill: 3  -     dapagliflozin Propanediol (Farxiga) 10 MG tablet; Take 10 mg by mouth Daily.  Dispense: 90 tablet; Refill: 3            Follow Up   Return in about 6 months (around 3/18/2024) for Annual physical.  Patient was given instructions and counseling regarding his condition or for  health maintenance advice. Please see specific information pulled into the AVS if appropriate.     Vimal Irene MD

## 2023-09-19 ENCOUNTER — HOME HEALTH ADMISSION (OUTPATIENT)
Dept: HOME HEALTH SERVICES | Facility: HOME HEALTHCARE | Age: 78
End: 2023-09-19
Payer: COMMERCIAL

## 2023-09-19 DIAGNOSIS — D50.9 IRON DEFICIENCY ANEMIA, UNSPECIFIED IRON DEFICIENCY ANEMIA TYPE: ICD-10-CM

## 2023-09-19 DIAGNOSIS — D64.9 ANEMIA, UNSPECIFIED TYPE: Primary | ICD-10-CM

## 2023-09-19 LAB
ALBUMIN SERPL-MCNC: 4.3 G/DL (ref 3.8–4.8)
ALBUMIN/GLOB SERPL: 1.9 {RATIO} (ref 1.2–2.2)
ALP SERPL-CCNC: 81 IU/L (ref 44–121)
ALT SERPL-CCNC: 11 IU/L (ref 0–44)
AST SERPL-CCNC: 17 IU/L (ref 0–40)
BASOPHILS # BLD AUTO: 0 X10E3/UL (ref 0–0.2)
BASOPHILS NFR BLD AUTO: 1 %
BILIRUB SERPL-MCNC: 1.6 MG/DL (ref 0–1.2)
BUN SERPL-MCNC: 22 MG/DL (ref 8–27)
BUN/CREAT SERPL: 25 (ref 10–24)
CALCIUM SERPL-MCNC: 8.9 MG/DL (ref 8.6–10.2)
CHLORIDE SERPL-SCNC: 109 MMOL/L (ref 96–106)
CHOLEST SERPL-MCNC: 79 MG/DL (ref 100–199)
CO2 SERPL-SCNC: 23 MMOL/L (ref 20–29)
CREAT SERPL-MCNC: 0.87 MG/DL (ref 0.76–1.27)
EGFRCR SERPLBLD CKD-EPI 2021: 89 ML/MIN/1.73
EOSINOPHIL # BLD AUTO: 0.3 X10E3/UL (ref 0–0.4)
EOSINOPHIL NFR BLD AUTO: 3 %
ERYTHROCYTE [DISTWIDTH] IN BLOOD BY AUTOMATED COUNT: 13.6 % (ref 11.6–15.4)
FERRITIN SERPL-MCNC: 19 NG/ML (ref 30–400)
FOLATE SERPL-MCNC: >20 NG/ML
GLOBULIN SER CALC-MCNC: 2.3 G/DL (ref 1.5–4.5)
GLUCOSE SERPL-MCNC: 73 MG/DL (ref 70–99)
HBA1C MFR BLD: 5.4 % (ref 4.8–5.6)
HCT VFR BLD AUTO: 33.6 % (ref 37.5–51)
HDLC SERPL-MCNC: 36 MG/DL
HGB BLD-MCNC: 11.4 G/DL (ref 13–17.7)
IMM GRANULOCYTES # BLD AUTO: 0 X10E3/UL (ref 0–0.1)
IMM GRANULOCYTES NFR BLD AUTO: 0 %
IRON SERPL-MCNC: 54 UG/DL (ref 38–169)
LDLC SERPL CALC-MCNC: 25 MG/DL (ref 0–99)
LYMPHOCYTES # BLD AUTO: 2.2 X10E3/UL (ref 0.7–3.1)
LYMPHOCYTES NFR BLD AUTO: 30 %
Lab: NORMAL
MAGNESIUM SERPL-MCNC: 2.2 MG/DL (ref 1.6–2.3)
MCH RBC QN AUTO: 30.6 PG (ref 26.6–33)
MCHC RBC AUTO-ENTMCNC: 33.9 G/DL (ref 31.5–35.7)
MCV RBC AUTO: 90 FL (ref 79–97)
MONOCYTES # BLD AUTO: 0.5 X10E3/UL (ref 0.1–0.9)
MONOCYTES NFR BLD AUTO: 7 %
NEUTROPHILS # BLD AUTO: 4.2 X10E3/UL (ref 1.4–7)
NEUTROPHILS NFR BLD AUTO: 59 %
PLATELET # BLD AUTO: 191 X10E3/UL (ref 150–450)
POTASSIUM SERPL-SCNC: 4.3 MMOL/L (ref 3.5–5.2)
PROT SERPL-MCNC: 6.6 G/DL (ref 6–8.5)
RBC # BLD AUTO: 3.73 X10E6/UL (ref 4.14–5.8)
SODIUM SERPL-SCNC: 145 MMOL/L (ref 134–144)
T4 FREE SERPL-MCNC: 1.13 NG/DL (ref 0.82–1.77)
TRIGL SERPL-MCNC: 93 MG/DL (ref 0–149)
TSH SERPL DL<=0.005 MIU/L-ACNC: 2.11 UIU/ML (ref 0.45–4.5)
VIT B12 SERPL-MCNC: 723 PG/ML (ref 232–1245)
VLDLC SERPL CALC-MCNC: 18 MG/DL (ref 5–40)
WBC # BLD AUTO: 7.3 X10E3/UL (ref 3.4–10.8)

## 2023-09-20 ENCOUNTER — HOME CARE VISIT (OUTPATIENT)
Dept: HOME HEALTH SERVICES | Facility: HOME HEALTHCARE | Age: 78
End: 2023-09-20
Payer: COMMERCIAL

## 2023-09-20 VITALS
SYSTOLIC BLOOD PRESSURE: 110 MMHG | HEART RATE: 63 BPM | DIASTOLIC BLOOD PRESSURE: 60 MMHG | TEMPERATURE: 97.2 F | RESPIRATION RATE: 16 BRPM | OXYGEN SATURATION: 97 %

## 2023-09-20 PROCEDURE — G0151 HHCP-SERV OF PT,EA 15 MIN: HCPCS

## 2023-09-20 NOTE — CASE COMMUNICATION
"SOC Note: Pt admitted to [facility] on 9/20/23     Home Health ordered for: PT and OT    Reason for Hosp/Primary Dx/Co-morbidities: Polyosteoarthritis/Bilateral exudative age-related macular degeneration, BPH, Bursitis of left elbow, Chronic fatigue, Chronic systolic heart failure, CKD, Diabetic retinopathy, Diastolic dysfunction, Dilated cardiomyopathy, Elevated lipids, Frequent falls, Insomnia, depression, Mixed hyperlip idemia, Peripheral polyneuropathy, Small vessel coronary artery disease, Type 2 diabetes mellitus with diabetic neuropathy    Focus of Care: HHPT 1wk1, 2wk3, 1wk4 for endurance training, gait training, balance training, therapeutic exercise, pt education, HEP instruction related to polyosteoarthritis    Patient's goal(s): \"to get strong enough to be able to go to the [place] to [place]\"    Current Functional status/mobility/DME: pt has a  rollator, FWW    HB status/Living Arrangements: pt lives with his spouse, ramp to enter through the garage    Skin Integrity/wound status: no deficits noted    Code Status: full code    Fall Risk/Safety concerns: high fall risk per Tinetti    Medication issues/Concerns: none    Additional Problems/Concerns: none    SDOH Barriers (i.e. caregiver concerns, social isolation, transportation, food insecurity, environment, income etc.)/Need f or MSW: n/a    Plan for next visit: issue and teach written HEP"

## 2023-09-20 NOTE — HOME HEALTH
"SOC Note: Pt admitted to [facility] on 9/20/23     Home Health ordered for: PT and OT    Reason for Hosp/Primary Dx/Co-morbidities: Polyosteoarthritis/Bilateral exudative age-related macular degeneration, BPH, Bursitis of left elbow, Chronic fatigue, Chronic systolic heart failure, CKD, Diabetic retinopathy, Diastolic dysfunction, Dilated cardiomyopathy, Elevated lipids, Frequent falls, Insomnia, depression, Mixed hyperlipidemia, Peripheral polyneuropathy, Small vessel coronary artery disease, Type 2 diabetes mellitus with diabetic neuropathy    Focus of Care: HHPT 1wk1, 2wk3, 1wk4 for endurance training, gait training, balance training, therapeutic exercise, pt education, HEP instruction related to B knee osteoarthritis    Patient's goal(s): \"to get strong enough to be able to go [place] to [place]\"    Current Functional status/mobility/DME: pt has a rollator, FWW    HB status/Living Arrangements: pt lives with his spouse, ramp to enter through the garage    Skin Integrity/wound status: no deficits noted    Code Status: full code    Fall Risk/Safety concerns: high fall risk per Tinetti    Medication issues/Concerns: none    Additional Problems/Concerns: none    SDOH Barriers (i.e. caregiver concerns, social isolation, transportation, food insecurity, environment, income etc.)/Need for MSW: n/a    Plan for next visit: issue and teach written HEP"

## 2023-09-21 ENCOUNTER — HOME CARE VISIT (OUTPATIENT)
Dept: HOME HEALTH SERVICES | Facility: HOME HEALTHCARE | Age: 78
End: 2023-09-21
Payer: MEDICARE

## 2023-09-21 ENCOUNTER — TELEPHONE (OUTPATIENT)
Dept: FAMILY MEDICINE CLINIC | Facility: CLINIC | Age: 78
End: 2023-09-21

## 2023-09-21 PROCEDURE — G0152 HHCP-SERV OF OT,EA 15 MIN: HCPCS

## 2023-09-21 NOTE — TELEPHONE ENCOUNTER
Caller: BRANDY BABB REPRESENTATIVE    Relationship to patient: Other    Best call back number: [phone]     Patient is needing: CASE NUMBER: [id number]    lidocaine (XYLOCAINE) 5 % ointment     BRANDY IS REQUESTING MORE INFORMATION TO COMPLETE THE PRIOR AUTHORIZATION OVER THE PRESCRIPTION LISTED FOR THE PATIENT. THEY ARE NEEDING TO COMPLETE CRITERIA QUESTIONS.

## 2023-09-21 NOTE — HOME HEALTH
"OT Evaluation Note:   Reason for Hosp/Primary Dx/Co-morbidities:Polyosteoarthritis/Bilateral exudative age-related macular degeneration, BPH, Bursitis of left elbow, Chronic fatigue, Chronic systolic heart failure, CKD, Diabetic retinopathy, Diastolic dysfunction, Dilated cardiomyopathy, Elevated lipids, Frequent falls, Insomnia, depression, Mixed hyperlipidemia, Peripheral polyneuropathy, Small vessel coronary artery disease, Type 2 diabetes mellitus with diabetic neuropathy  Focus of Care: HHOT 1wk 3 for EC/WS principles w/ADL/IADL skills, low vision adaptions/environment s/u, balance training, therapeutic exercise, pt education/training for HEP, home safety/fall prevention related to    Patient's goal(s): \"to get stronger and start going to [place].\"  Current Functional status/mobility/DME:  pt has a rollator, FWW, build in shower chair, hand held shower, grab bars  HB status/Living Arrangements: Pt lives w/ wife and 2 large dogs in a single level home w/ ramp in garage and 2 steps at front door.  Skin Integrity/wound status: no deficits   Code Status: full code   Fall Risk/Safety concerns: high fall risk  Plan for next visit: Low vision adaptations, leisure skills for Books [employer] T[employer]/ [employer], home safety recommendations, HEP and handouts."

## 2023-09-22 NOTE — TELEPHONE ENCOUNTER
[facility]ELON CALLED. STATES THAT THEY HAVE FAXED OVER A FORM FOR THE PA.  THEY SAID IF THE CLINICAL QUESTIONS COULD BE ANSWERED AND SENT BACK THEY COULD PROCESS IT.

## 2023-09-26 NOTE — TELEPHONE ENCOUNTER
Caller: DANETTE - [employer]    Relationship to patient: Other    Best call back number: [phone]     Patient is needing: DANETTE STATES SHE RECEIVED THE URGENT APPEAL REQUEST FOR THE PATIENTS PRESCRIPTION.     lidocaine (XYLOCAINE) 5 % ointment     DANETTE STATES THE PRESCRIPTION SHOULD BE WRITTEN FOR THE LIDOCAINE PATCHES AS THEY ARE FDA APPROVED TO TREAT DIABETIC NEUROPATHY NOT THE OINTMENT. DANETTE IS OFFERING TO OPEN A CASE FOR THE PATCHES IF REQUESTED.     CALL BACK TO DISCUSS.

## 2023-09-27 ENCOUNTER — HOME CARE VISIT (OUTPATIENT)
Dept: HOME HEALTH SERVICES | Facility: HOME HEALTHCARE | Age: 78
End: 2023-09-27
Payer: COMMERCIAL

## 2023-09-27 RX ORDER — LIDOCAINE 50 MG/G
1 PATCH TOPICAL EVERY 24 HOURS
Qty: 30 PATCH | Refills: 3 | Status: SHIPPED | OUTPATIENT
Start: 2023-09-27

## 2023-09-27 NOTE — TELEPHONE ENCOUNTER
Caller: DANETTE - [employer]     Relationship to patient: Other     Best call back number: [phone]      Patient is needing: DANETTE STATES SHE RECEIVED THE URGENT APPEAL REQUEST FOR THE PATIENTS PRESCRIPTION.      lidocaine (XYLOCAINE) 5 % ointment      DANETTE STATES THE PRESCRIPTION SHOULD BE WRITTEN FOR THE LIDOCAINE PATCHES AS THEY ARE FDA APPROVED TO TREAT DIABETIC NEUROPATHY NOT THE OINTMENT. DANETTE IS OFFERING TO OPEN A CASE FOR THE PATCHES IF REQUESTED.        Dr. Irene can you help with this please?

## 2023-09-27 NOTE — CASE COMMUNICATION
Patient missed a PTA visit from [facility] on 9/27/23.    Reason: Pt. was unable to be seen at available AM appointment time. PTA will see pt. PM Friday.      For your records only.   As per home health protocol, MD must be notified of missed/cancelled visits; therefore the prescribed frequency was not met.

## 2023-09-28 ENCOUNTER — HOME CARE VISIT (OUTPATIENT)
Dept: HOME HEALTH SERVICES | Facility: HOME HEALTHCARE | Age: 78
End: 2023-09-28
Payer: COMMERCIAL

## 2023-09-28 VITALS
RESPIRATION RATE: 16 BRPM | DIASTOLIC BLOOD PRESSURE: 72 MMHG | OXYGEN SATURATION: 98 % | HEART RATE: 65 BPM | SYSTOLIC BLOOD PRESSURE: 141 MMHG

## 2023-09-28 PROCEDURE — G0152 HHCP-SERV OF OT,EA 15 MIN: HCPCS

## 2023-09-28 NOTE — HOME HEALTH
Routine Visit Note:    Skill/education provided: Pt greeted OT at door w/o cane and dogs underfoot. Reinforced safety w/ decrease balance and low vision.Upgraded HEP w/ increase reps as indicated. Pt has resumed some UB ther ex from previous routine- cautioned pt from completing wt's overhead. Completed paperwork for Books [employer] T[employer], except for survey of book prefrerences. Wife w/ complete later today. VC for safety w/ leisure task- ball toss w/ dog on back porch.     Patient/caregiver response: Pt tolerated session well, low vision dots on microware helpful and pt warming up food/drink w/ mod ind.     Plan for next visit: d/c planning    Other pertinent info: Pt reports he had a close fall on Tues, when going down step in garage. Problem solving of balance vs low vision, pt walking w/o cane, step is a dark red color and would benefit from highlight on edge of step.

## 2023-09-29 ENCOUNTER — HOME CARE VISIT (OUTPATIENT)
Dept: HOME HEALTH SERVICES | Facility: HOME HEALTHCARE | Age: 78
End: 2023-09-29
Payer: COMMERCIAL

## 2023-09-29 ENCOUNTER — PATIENT ROUNDING (BHMG ONLY) (OUTPATIENT)
Dept: FAMILY MEDICINE CLINIC | Facility: CLINIC | Age: 78
End: 2023-09-29
Payer: MEDICARE

## 2023-09-29 VITALS
DIASTOLIC BLOOD PRESSURE: 68 MMHG | SYSTOLIC BLOOD PRESSURE: 115 MMHG | HEART RATE: 60 BPM | RESPIRATION RATE: 16 BRPM | OXYGEN SATURATION: 97 %

## 2023-09-29 PROCEDURE — G0157 HHC PT ASSISTANT EA 15: HCPCS

## 2023-09-29 NOTE — PROGRESS NOTES
September 29, 2023    Hello, may I speak with Joe Fung?    My name is GILBERT JOHNSON    I am  with [facility] [id number] [phone].    Before we get started may I verify your date of birth? 1945    I am calling to officially welcome you to our practice and ask about your recent visit. Is this a good time to talk? NO LVM    Tell me about your visit with us. What things went well? LVM       We're always looking for ways to make our patients' experiences even better. Do you have recommendations on ways we may improve?  NO    Overall were you satisfied with your first visit to our practice? LVM       I appreciate you taking the time to speak with me today. Is there anything else I can do for you? NO LVM      Thank you, and have a great day.

## 2023-10-03 ENCOUNTER — HOME CARE VISIT (OUTPATIENT)
Dept: HOME HEALTH SERVICES | Facility: HOME HEALTHCARE | Age: 78
End: 2023-10-03
Payer: MEDICARE

## 2023-10-03 VITALS
RESPIRATION RATE: 13 BRPM | DIASTOLIC BLOOD PRESSURE: 74 MMHG | OXYGEN SATURATION: 96 % | HEART RATE: 75 BPM | SYSTOLIC BLOOD PRESSURE: 124 MMHG

## 2023-10-03 PROCEDURE — G0157 HHC PT ASSISTANT EA 15: HCPCS

## 2023-10-05 ENCOUNTER — HOME CARE VISIT (OUTPATIENT)
Dept: HOME HEALTH SERVICES | Facility: HOME HEALTHCARE | Age: 78
End: 2023-10-05
Payer: MEDICARE

## 2023-10-05 PROCEDURE — G0152 HHCP-SERV OF OT,EA 15 MIN: HCPCS

## 2023-10-06 ENCOUNTER — HOME CARE VISIT (OUTPATIENT)
Dept: HOME HEALTH SERVICES | Facility: HOME HEALTHCARE | Age: 78
End: 2023-10-06
Payer: MEDICARE

## 2023-10-06 VITALS
SYSTOLIC BLOOD PRESSURE: 108 MMHG | DIASTOLIC BLOOD PRESSURE: 60 MMHG | RESPIRATION RATE: 14 BRPM | HEART RATE: 86 BPM | OXYGEN SATURATION: 98 %

## 2023-10-06 VITALS
HEART RATE: 67 BPM | DIASTOLIC BLOOD PRESSURE: 70 MMHG | SYSTOLIC BLOOD PRESSURE: 118 MMHG | OXYGEN SATURATION: 96 % | RESPIRATION RATE: 16 BRPM

## 2023-10-06 PROCEDURE — G0157 HHC PT ASSISTANT EA 15: HCPCS

## 2023-10-06 NOTE — HOME HEALTH
Discharge Summary/Summary of Care Provided: Pt participated in 3 HHOT visits since SOC w/ focus on for EC/WS principles w/ADL/IADL skills, low vision adaptions/environment s/u, balance training, therapeutic exercise, pt education/training for HEP, home safety/fall prevention   Patient received home health for diagnosis: Polyosteoarthritis/Bilateral exudative age-related macular degeneration, BPH, Bursitis of left elbow, Chronic fatigue, Chronic systolic heart failure, CKD, Diabetic retinopathy, Diastolic dysfunction, Dilated cardiomyopathy, Elevated lipids, Frequent falls, Insomnia, depression, Mixed hyperlipidemia, Peripheral polyneuropathy, Small vessel coronary artery disease, Type 2 diabetes mellitus with diabetic neuropathy   Current level of functional ability: Pt is mod ind w/ ADL/lt IADL and leisure- pet care  Living arrangements: Pt lives w/ wife in a single story home w/ step to enter at garage and 2 dogs  Progress towards goals and/or Were all goals met? yes  If not all goals met, barriers that prevented patient from meeting goals: n/a  SDOH concerns (i.e. Caregiver availability, social isolation, environment, income, transportation access, food insecurity etc.) n/a  Follow-up appointment plans and community resources provided:   Other imporant information. PT remains in the home

## 2023-10-06 NOTE — HOME HEALTH
PT Routine visit note    Skill/education provided: gait, therapuetic exercises    pt. response: pt. is cooperative and agreeable to PT today; pt. reports not sleeping well last night and is tired and aching more today.  He demos ability to perform seated TE w/ blue T-band vs ankle weights today due to soreness from last session.      Plan for next visit: upgrade to standing TE if tolerated

## 2023-10-07 NOTE — HOME HEALTH
PT Routine visit note    Skill/education provided: outdoor ambulation, standing TE, timed walk    Pt. response: pt. is cooperative and agreeable to PT today; pt was outside mowing lawn on riding mower upon arrival, he demos ability to transfer off mower and stand w/ assist of truck parked beside of mower.  Pt. demos ability to ambulate in home including navigating 2 JOSE w/ walking stick and spv level.  Pt. tolerates upgrading TE to include standing exercises today.    Plan for next visit: continue to progress standing TE as tolerated

## 2023-10-09 ENCOUNTER — HOME CARE VISIT (OUTPATIENT)
Dept: HOME HEALTH SERVICES | Facility: HOME HEALTHCARE | Age: 78
End: 2023-10-09
Payer: COMMERCIAL

## 2023-10-09 VITALS
OXYGEN SATURATION: 98 % | HEART RATE: 71 BPM | SYSTOLIC BLOOD PRESSURE: 138 MMHG | RESPIRATION RATE: 14 BRPM | DIASTOLIC BLOOD PRESSURE: 80 MMHG

## 2023-10-09 PROCEDURE — G0157 HHC PT ASSISTANT EA 15: HCPCS

## 2023-10-09 NOTE — HOME HEALTH
PT Routine visit note    Skill/education proivded: gait, therapuetic exercises    Pt. response: pt. is cooperative and agreeable to PT today; Pt. demos ability to increase ambulation duration w/ 4WRW indoors before seated break, continues to be limited by fatigue.  Pt. able to tolerate TE using blue band resistance today, no reports of increase in pain level post tx.    Plan for next visit: standing exercises as tolerated

## 2023-10-11 ENCOUNTER — HOME CARE VISIT (OUTPATIENT)
Dept: HOME HEALTH SERVICES | Facility: HOME HEALTHCARE | Age: 78
End: 2023-10-11
Payer: COMMERCIAL

## 2023-10-11 NOTE — CASE COMMUNICATION
Patient missed HHPT visit scheduled for 10/11/2023, therefore will not meet visit frequency this week.    Reason: patient canceled visit states he doesn't feel well    Thank you,  Hue Chavez, PTA
none

## 2023-10-16 DIAGNOSIS — E11.42 TYPE 2 DIABETES MELLITUS WITH PERIPHERAL NEUROPATHY: ICD-10-CM

## 2023-10-16 RX ORDER — GABAPENTIN 100 MG/1
CAPSULE ORAL
Qty: 180 CAPSULE | Refills: 5 | OUTPATIENT
Start: 2023-10-16

## 2023-10-16 NOTE — TELEPHONE ENCOUNTER
Caller: KYREE RAMIREZ    Relationship: Emergency Contact    Best call back number: [phone]     Requested Prescriptions:   Requested Prescriptions     Pending Prescriptions Disp Refills    gabapentin (NEURONTIN) 100 MG capsule 180 capsule 5     Sig: Indications: Diabetes with Nerve Disease      Pharmacy where request should be sent: [facility] [id number] [place] [id number]   S - [phone] PH - [phone] FX     Last office visit with prescribing clinician: 9/18/2023   Last telemedicine visit with prescribing clinician: Visit date not found   Next office visit with prescribing clinician: Visit date not found     Additional details provided by patient: THE PATIENT WILL BE OUT ON THURSDAY 10.19.23    Does the patient have less than a 3 day supply:  [x] Yes  [] No    Would you like a call back once the refill request has been completed: [] Yes [x] No    If the office needs to give you a call back, can they leave a voicemail: [] Yes [x] No    Vielka Machuca Rep   10/16/23 14:14 EDT

## 2023-10-17 ENCOUNTER — HOME CARE VISIT (OUTPATIENT)
Dept: HOME HEALTH SERVICES | Facility: HOME HEALTHCARE | Age: 78
End: 2023-10-17
Payer: MEDICARE

## 2023-10-17 VITALS
HEART RATE: 65 BPM | OXYGEN SATURATION: 97 % | RESPIRATION RATE: 16 BRPM | SYSTOLIC BLOOD PRESSURE: 124 MMHG | DIASTOLIC BLOOD PRESSURE: 83 MMHG | TEMPERATURE: 96.8 F

## 2023-10-17 PROCEDURE — G0151 HHCP-SERV OF PT,EA 15 MIN: HCPCS

## 2023-10-18 NOTE — HOME HEALTH
Physical Therapy Reassessment:     Summary of Care: pt has been seen 6 skilled HHPT visits since SOC related to B knee OA    Progress thus far: pt demonstrates improved BLE strength as evidenced by increased 30 sec STS; improved balance as evidenced by increased score on Tinetti and improved fucntional mobility as evidenced by decreased score on TUG    Patient/CG Participation: pt is pleasant and cooperative with participation and reports compliance with HEP    Remaining Problems: pt demonstrates continued deficits in gait, balance and strength and would benefit from continued HHPT 1wk1 per current POC    Plan of Care Changes:no    Equipment Needs:none

## 2023-10-19 DIAGNOSIS — I42.0 CARDIOMYOPATHY, DILATED: ICD-10-CM

## 2023-10-19 RX ORDER — SACUBITRIL AND VALSARTAN 97; 103 MG/1; MG/1
1 TABLET, FILM COATED ORAL 2 TIMES DAILY
Qty: 180 TABLET | Refills: 1 | OUTPATIENT
Start: 2023-10-19

## 2023-10-26 ENCOUNTER — HOME CARE VISIT (OUTPATIENT)
Dept: HOME HEALTH SERVICES | Facility: HOME HEALTHCARE | Age: 78
End: 2023-10-26
Payer: COMMERCIAL

## 2023-10-26 VITALS
OXYGEN SATURATION: 97 % | RESPIRATION RATE: 16 BRPM | HEART RATE: 68 BPM | SYSTOLIC BLOOD PRESSURE: 142 MMHG | TEMPERATURE: 96.2 F | DIASTOLIC BLOOD PRESSURE: 70 MMHG

## 2023-10-26 PROCEDURE — G0151 HHCP-SERV OF PT,EA 15 MIN: HCPCS

## 2023-11-04 DIAGNOSIS — I42.0 CARDIOMYOPATHY, DILATED: ICD-10-CM

## 2023-11-06 RX ORDER — SACUBITRIL AND VALSARTAN 97; 103 MG/1; MG/1
1 TABLET, FILM COATED ORAL 2 TIMES DAILY
Qty: 180 TABLET | Refills: 1 | OUTPATIENT
Start: 2023-11-06

## 2023-11-27 ENCOUNTER — TELEPHONE (OUTPATIENT)
Dept: FAMILY MEDICINE CLINIC | Facility: CLINIC | Age: 78
End: 2023-11-27

## 2023-11-27 NOTE — TELEPHONE ENCOUNTER
Pts wife called wanting to get different inquiries on gastro providers. She said she thought Maria Victoria had referred them to someone before but can't remember who it is and it is not in the system. She asked if someone could call her back to discuss the different options.

## 2024-01-19 NOTE — TELEPHONE ENCOUNTER
Caller: JAMES KYREE    Relationship: Emergency Contact    Best call back number: [phone]    Requested Prescriptions:   Requested Prescriptions     Pending Prescriptions Disp Refills    Continuous Glucose Monitor Sup kit 6 kit 3     Sig: Use 1 kit Every 14 (Fourteen) Days. Apply one sensor every 14 days as instructed        Pharmacy where request should be sent: [facility], [address] [phone] [facility] [phone]      Last office visit with prescribing clinician: 9/18/2023   Last telemedicine visit with prescribing clinician: Visit date not found   Next office visit with prescribing clinician: 3/22/2024     Additional details provided by patient: PATIENT'S INSURANCE COVERS FREESTYLE    Does the patient have less than a 3 day supply:  [x] Yes  [] No    Vielka Wylie Rep   01/19/24 11:33 EST

## 2024-01-22 DIAGNOSIS — F51.04 CHRONIC INSOMNIA: ICD-10-CM

## 2024-01-22 RX ORDER — LIDOCAINE 50 MG/G
1 PATCH TOPICAL EVERY 24 HOURS
Qty: 30 PATCH | Refills: 3 | OUTPATIENT
Start: 2024-01-22

## 2024-01-22 RX ORDER — ESZOPICLONE 2 MG/1
2 TABLET, FILM COATED ORAL NIGHTLY
Qty: 30 TABLET | Refills: 5 | OUTPATIENT
Start: 2024-01-22

## 2024-01-22 NOTE — TELEPHONE ENCOUNTER
Caller: Joe Fung    Relationship: Self    Best call back number: [phone]    Requested Prescriptions:   Requested Prescriptions     Pending Prescriptions Disp Refills    eszopiclone (LUNESTA) 2 MG tablet 30 tablet 5     Sig: Take 1 tablet by mouth Every Night. Take immediately before bedtime    lidocaine (LIDODERM) 5 % 30 patch 3     Sig: Place 1 patch on the skin as directed by provider Daily. Remove & Discard patch within 12 hours or as directed by MD. Must leave off for 12 hours before applying new patch        Pharmacy where request should be sent: [facility] [id number] [place] [id number] [facility] [address] - [phone] [facility] [phone] FX  [facility] [address] [phone] [facility] [phone] FX     Last office visit with prescribing clinician: 9/18/2023   Last telemedicine visit with prescribing clinician: Visit date not found   Next office visit with prescribing clinician: 3/22/2024     Additional details provided by patient: PATIENT IS COMPLETELY OUT OF THEIR LUNESTA, AND IS RUNNING LOW ON THEIR LIDODERM        Does the patient have less than a 3 day supply:  [x] Yes  [] No    Would you like a call back once the refill request has been completed: [] Yes [x] No    If the office needs to give you a call back, can they leave a voicemail: [] Yes [x] No    Vielka Lombardo Rep   01/22/24 12:46 EST

## 2024-01-24 DIAGNOSIS — F51.04 CHRONIC INSOMNIA: ICD-10-CM

## 2024-01-24 RX ORDER — ESZOPICLONE 2 MG/1
TABLET, FILM COATED ORAL
Qty: 30 TABLET | Refills: 2 | Status: SHIPPED | OUTPATIENT
Start: 2024-01-24

## 2024-01-24 RX ORDER — LIDOCAINE 50 MG/G
1 PATCH TOPICAL EVERY 24 HOURS
Qty: 30 PATCH | Refills: 11 | Status: SHIPPED | OUTPATIENT
Start: 2024-01-24

## 2024-01-25 ENCOUNTER — TELEPHONE (OUTPATIENT)
Dept: FAMILY MEDICINE CLINIC | Facility: CLINIC | Age: 79
End: 2024-01-25
Payer: MEDICARE

## 2024-01-25 NOTE — TELEPHONE ENCOUNTER
Winter is requesting patient receive a continuous glucose monitor sent to [facility].   She stated they never received the prescription.     Can we look into this please? Thanks!

## 2024-01-26 DIAGNOSIS — E11.42 TYPE 2 DIABETES MELLITUS WITH PERIPHERAL NEUROPATHY: Primary | ICD-10-CM

## 2024-03-08 ENCOUNTER — TELEPHONE (OUTPATIENT)
Dept: FAMILY MEDICINE CLINIC | Facility: CLINIC | Age: 79
End: 2024-03-08

## 2024-03-17 DIAGNOSIS — E11.42 TYPE 2 DIABETES MELLITUS WITH PERIPHERAL NEUROPATHY: ICD-10-CM

## 2024-03-18 RX ORDER — GABAPENTIN 300 MG/1
CAPSULE ORAL
Qty: 90 CAPSULE | Refills: 0 | Status: SHIPPED | OUTPATIENT
Start: 2024-03-18

## 2024-03-18 NOTE — TELEPHONE ENCOUNTER
Caller: KYREE RAMIREZ    Relationship: Emergency Contact    Best call back number: [phone]    Requested Prescriptions:   Requested Prescriptions     Pending Prescriptions Disp Refills    gabapentin (NEURONTIN) 300 MG capsule [Pharmacy Med Name: GABAPENTIN 300 MG CAPSULE] 90 capsule      Sig: TAKE ONE CAPSULE BY MOUTH ONCE NIGHTLY        Pharmacy where request should be sent: [facility] [id number] [place] [id number] [facility] [address] - [phone]  - [phone] FX     Last office visit with prescribing clinician: 9/18/2023   Last telemedicine visit with prescribing clinician: Visit date not found   Next office visit with prescribing clinician: 3/22/2024         Does the patient have less than a 3 day supply:  [x] Yes  [] No    Would you like a call back once the refill request has been completed: [] Yes [x] No    If the office needs to give you a call back, can they leave a voicemail: [] Yes [x] No    Vielka Loja   03/18/24 08:34 EDT

## 2024-03-21 DIAGNOSIS — R53.83 OTHER FATIGUE: ICD-10-CM

## 2024-03-21 DIAGNOSIS — Z79.899 ENCOUNTER FOR LONG-TERM (CURRENT) USE OF OTHER MEDICATIONS: ICD-10-CM

## 2024-03-21 DIAGNOSIS — D53.9 DEFICIENCY ANEMIA: Primary | ICD-10-CM

## 2024-03-21 DIAGNOSIS — E11.42 TYPE 2 DIABETES MELLITUS WITH PERIPHERAL NEUROPATHY: ICD-10-CM

## 2024-03-21 DIAGNOSIS — E78.5 HYPERLIPIDEMIA LDL GOAL <70: ICD-10-CM

## 2024-04-02 ENCOUNTER — OFFICE VISIT (OUTPATIENT)
Dept: FAMILY MEDICINE CLINIC | Facility: CLINIC | Age: 79
End: 2024-04-02
Payer: MEDICARE

## 2024-04-02 VITALS
HEIGHT: 70 IN | SYSTOLIC BLOOD PRESSURE: 128 MMHG | HEART RATE: 77 BPM | WEIGHT: 224 LBS | OXYGEN SATURATION: 93 % | DIASTOLIC BLOOD PRESSURE: 78 MMHG | BODY MASS INDEX: 32.07 KG/M2

## 2024-04-02 DIAGNOSIS — Z79.899 ENCOUNTER FOR LONG-TERM (CURRENT) USE OF OTHER MEDICATIONS: ICD-10-CM

## 2024-04-02 DIAGNOSIS — N40.1 LOWER URINARY TRACT SYMPTOMS DUE TO BENIGN PROSTATIC HYPERPLASIA: ICD-10-CM

## 2024-04-02 DIAGNOSIS — R07.9 CHEST PAIN, UNSPECIFIED TYPE: ICD-10-CM

## 2024-04-02 DIAGNOSIS — K21.9 GASTROESOPHAGEAL REFLUX DISEASE WITHOUT ESOPHAGITIS: ICD-10-CM

## 2024-04-02 DIAGNOSIS — N32.81 OVERACTIVE BLADDER: ICD-10-CM

## 2024-04-02 DIAGNOSIS — H35.3130 BILATERAL NONEXUDATIVE AGE-RELATED MACULAR DEGENERATION, UNSPECIFIED STAGE: ICD-10-CM

## 2024-04-02 DIAGNOSIS — I42.0 CARDIOMYOPATHY, DILATED: ICD-10-CM

## 2024-04-02 DIAGNOSIS — E78.5 HYPERLIPIDEMIA LDL GOAL <70: ICD-10-CM

## 2024-04-02 DIAGNOSIS — I50.22 CHRONIC SYSTOLIC HEART FAILURE: ICD-10-CM

## 2024-04-02 DIAGNOSIS — E11.319 DIABETIC RETINOPATHY OF BOTH EYES ASSOCIATED WITH TYPE 2 DIABETES MELLITUS, MACULAR EDEMA PRESENCE UNSPECIFIED, UNSPECIFIED RETINOPATHY SEVERITY: ICD-10-CM

## 2024-04-02 DIAGNOSIS — R10.13 EPIGASTRIC PAIN: ICD-10-CM

## 2024-04-02 DIAGNOSIS — Z00.00 ENCOUNTER FOR SUBSEQUENT ANNUAL WELLNESS VISIT (AWV) IN MEDICARE PATIENT: Primary | ICD-10-CM

## 2024-04-02 DIAGNOSIS — E11.42 TYPE 2 DIABETES MELLITUS WITH PERIPHERAL NEUROPATHY: ICD-10-CM

## 2024-04-02 DIAGNOSIS — I10 ESSENTIAL HYPERTENSION: ICD-10-CM

## 2024-04-02 DIAGNOSIS — R17 JAUNDICE: ICD-10-CM

## 2024-04-02 DIAGNOSIS — F51.04 CHRONIC INSOMNIA: ICD-10-CM

## 2024-04-02 RX ORDER — GABAPENTIN 100 MG/1
100 CAPSULE ORAL 2 TIMES DAILY PRN
Qty: 180 CAPSULE | Refills: 1 | Status: SHIPPED | OUTPATIENT
Start: 2024-04-02

## 2024-04-02 RX ORDER — ESZOPICLONE 2 MG/1
2 TABLET, FILM COATED ORAL NIGHTLY
Qty: 30 TABLET | Refills: 5 | Status: SHIPPED | OUTPATIENT
Start: 2024-04-02

## 2024-04-02 RX ORDER — CARVEDILOL 6.25 MG/1
6.25 TABLET ORAL 2 TIMES DAILY WITH MEALS
Qty: 180 TABLET | Refills: 3 | Status: SHIPPED | OUTPATIENT
Start: 2024-04-02 | End: 2024-04-02 | Stop reason: SDUPTHER

## 2024-04-02 RX ORDER — CARVEDILOL 6.25 MG/1
6.25 TABLET ORAL 2 TIMES DAILY WITH MEALS
Qty: 180 TABLET | Refills: 3 | Status: SHIPPED | OUTPATIENT
Start: 2024-04-02

## 2024-04-02 NOTE — PROGRESS NOTES
The ABCs of the Annual Wellness Visit  Subsequent Medicare Wellness Visit    Subjective    Joe Fung is a 79 y.o. male who presents for a Subsequent Medicare Wellness Visit.    The following portions of the patient's history were reviewed and   updated as appropriate: allergies, current medications, past family history, past medical history, past social history, past surgical history, and problem list.    Compared to one year ago, the patient feels his physical   health is worse.    Compared to one year ago, the patient feels his mental   health is the same.    Recent Hospitalizations:  He was not admitted to the hospital during the last year.       Current Medical Providers:  Patient Care Team:  Vimal Irene MD as PCP - General (Family Medicine)  Cade North MD as Consulting Physician (Cardiology)    Outpatient Medications Prior to Visit   Medication Sig Dispense Refill    Alpha-Lipoic Acid 600 MG tablet Take  by mouth. Indications: Muscle Cramps      aspirin 81 MG chewable tablet Chew 1 tablet Daily.      atorvastatin (LIPITOR) 20 MG tablet Take 1 tablet by mouth Daily. (Patient taking differently: Take 2 tablets by mouth Daily.) 90 tablet 3    carvedilol (COREG) 6.25 MG tablet Take 1 tablet by mouth 2 (Two) Times a Day With Meals. Indications: High Blood Pressure Disorder      cetirizine (zyrTEC) 10 MG tablet Take 1 tablet by mouth Daily. Indications: Hayfever      Cinnamon 500 MG capsule Take  by mouth.      coenzyme Q10 100 MG capsule Take  by mouth.      Continuous Glucose Monitor Sup kit Use 1 kit Every 14 (Fourteen) Days. Apply one sensor every 14 days as instructed 6 kit 3    dapagliflozin Propanediol (Farxiga) 10 MG tablet Take 10 mg by mouth Daily. 90 tablet 3    eszopiclone (LUNESTA) 2 MG tablet TAKE 1 TABLET BY MOUTH DAILY IMMEDIATELY BEFORE BEDTIME 30 tablet 2    finasteride (PROSCAR) 5 MG tablet Take 1 tablet by mouth Daily. 90 tablet 3    gabapentin (NEURONTIN) 100 MG capsule TAKE  ONE CAPSULE BY MOUTH TWICE A DAY AS NEEDED 180 capsule 5    gabapentin (NEURONTIN) 300 MG capsule TAKE ONE CAPSULE BY MOUTH ONCE NIGHTLY 90 capsule 0    Insulin Glargine (Lantus SoloStar) 100 UNIT/ML injection pen Inject 40 Units under the skin into the appropriate area as directed Every Night. (Patient taking differently: Inject 25 Units under the skin into the appropriate area as directed 2 (Two) Times a Day.) 75 mL 3    Insulin Pen Needle (Droplet Pen Needles) 31G X 8 MM misc Inject 1 each under the skin into the appropriate area as directed 2 (Two) Times a Day. 200 each 3    metFORMIN (GLUCOPHAGE) 500 MG tablet Take 1 tablet by mouth 2 (Two) Times a Day With Meals. 180 tablet 1    Mirabegron ER (MYRBETRIQ) 50 MG tablet sustained-release 24 hour 24 hr tablet Myrbetriq 50 mg tablet,extended release   Take 1 tablet every day by oral route in the morning.      montelukast (SINGULAIR) 10 MG tablet Take 1 tablet by mouth Every Evening. 90 tablet 3    multivitamin with minerals tablet tablet Take 1 tablet by mouth Daily. WITH FOOD      pantoprazole (PROTONIX) 40 MG EC tablet Take 1 tablet by mouth Daily. 90 tablet 3    venlafaxine XR (EFFEXOR-XR) 37.5 MG 24 hr capsule TAKE 1 CAPSULE DAILY 90 capsule 3    bumetanide (BUMEX) 2 MG tablet Take 1 tablet by mouth Daily. Prn  Indications: High Blood Pressure Disorder (Patient not taking: Reported on 4/2/2024)      lidocaine (LIDODERM) 5 % Place 1 patch on the skin as directed by provider Daily. Remove & Discard patch within 12 hours or as directed by MD. Must leave off for 12 hours before applying new patch 30 patch 11     No facility-administered medications prior to visit.       No opioid medication identified on active medication list. I have reviewed chart for other potential  high risk medication/s and harmful drug interactions in the elderly.        Aspirin is on active medication list. Aspirin use is indicated based on review of current medical condition/s. Pros and  "cons of this therapy have been discussed today. Benefits of this medication outweigh potential harm.  Patient has been encouraged to continue taking this medication.  .      Patient Active Problem List   Diagnosis    Cardiomyopathy, dilated    Hyperlipidemia LDL goal <70    Type 2 diabetes mellitus with peripheral neuropathy    Normochromic normocytic anemia    Hyperlipidemia    Urge incontinence of urine    Nocturia    Lower urinary tract symptoms due to benign prostatic hyperplasia    Systolic heart failure    Encounter for long-term (current) use of other medications    Deficiency anemia    Overactive bladder    Primary osteoarthritis of both hands    Diabetic ulcer of toe of right foot associated with type 2 diabetes mellitus, limited to breakdown of skin    Gastroesophageal reflux disease without esophagitis    Diastolic dysfunction    Bilateral nonexudative age-related macular degeneration    Essential hypertension    Obesity with body mass index 30 or greater    Presbyopia of both eyes    Pseudophakia of both eyes    Regular astigmatism of both eyes    Unqualified visual loss, both eyes    Frequent falls    Impairment of balance    Primary osteoarthritis of both hips    Primary osteoarthritis of both knees    Generalized weakness    Diabetic retinopathy of both eyes associated with type 2 diabetes mellitus     Advance Care Planning   Advance Care Planning     Advance Directive is on file.  ACP discussion was held with the patient during this visit. Patient has an advance directive in EMR which is still valid.      Objective    Vitals:    04/02/24 1523   BP: 128/78   BP Location: Left arm   Patient Position: Sitting   Cuff Size: Adult   Pulse: 77   SpO2: 93%   Weight: 102 kg (224 lb)   Height: 177.8 cm (70\")     Estimated body mass index is 32.14 kg/m² as calculated from the following:    Height as of this encounter: 177.8 cm (70\").    Weight as of this encounter: 102 kg (224 lb).           Does the patient " have evidence of cognitive impairment? No          HEALTH RISK ASSESSMENT    Smoking Status:  Social History     Tobacco Use   Smoking Status Never   Smokeless Tobacco Never     Alcohol Consumption:  Social History     Substance and Sexual Activity   Alcohol Use Yes     Fall Risk Screen:    DANNIELLE Fall Risk Assessment was completed, and patient is at LOW risk for falls.Assessment completed on:2024    Depression Screenin/2/2024     3:41 PM   PHQ-2/PHQ-9 Depression Screening   Little Interest or Pleasure in Doing Things 0-->not at all   Feeling Down, Depressed or Hopeless 0-->not at all   PHQ-9: Brief Depression Severity Measure Score 0       Health Habits and Functional and Cognitive Screenin/2/2024     3:41 PM   Functional & Cognitive Status   Do you have difficulty preparing food and eating? No   Do you have difficulty bathing yourself, getting dressed or grooming yourself? No   Do you have difficulty using the toilet? Yes   Do you have difficulty moving around from place to place? Yes   Do you have trouble with steps or getting out of a bed or a chair? Yes   Current Diet Well Balanced Diet   Dental Exam Up to date   Eye Exam Up to date   Exercise (times per week) 0 times per week   Current Exercises Include No Regular Exercise   Do you need help using the phone?  No   Are you deaf or do you have serious difficulty hearing?  Yes   Do you need help to go to places out of walking distance? Yes   Do you need help shopping? Yes   Do you need help preparing meals?  Yes   Do you need help with housework?  Yes   Do you need help with laundry? Yes   Do you need help taking your medications? Yes   Do you need help managing money? No   Do you ever drive or ride in a car without wearing a seat belt? No   Have you felt unusual stress, anger or loneliness in the last month? Yes   Who do you live with? Spouse   If you need help, do you have trouble finding someone available to you? No   Have you been  bothered in the last four weeks by sexual problems? No   Do you have difficulty concentrating, remembering or making decisions? Yes       Age-appropriate Screening Schedule:  Refer to the list below for future screening recommendations based on patient's age, sex and/or medical conditions. Orders for these recommended tests are listed in the plan section. The patient has been provided with a written plan.    Health Maintenance   Topic Date Due    Pneumococcal Vaccine 65+ (1 of 2 - PCV) Never done    TDAP/TD VACCINES (1 - Tdap) Never done    RSV Vaccine - Adults (1 - 1-dose 60+ series) Never done    ANNUAL WELLNESS VISIT  06/23/2023    COVID-19 Vaccine (1 - 2023-24 season) Never done    URINE MICROALBUMIN  12/16/2023    HEMOGLOBIN A1C  03/18/2024    INFLUENZA VACCINE  08/01/2024    DIABETIC EYE EXAM  08/02/2024    LIPID PANEL  09/18/2024    BMI FOLLOWUP  04/02/2025    HEPATITIS C SCREENING  Completed    ZOSTER VACCINE  Completed    COLORECTAL CANCER SCREENING  Discontinued                  CMS Preventative Services Quick Reference  Risk Factors Identified During Encounter  None Identified  The above risks/problems have been discussed with the patient.  Pertinent information has been shared with the patient in the After Visit Summary.  An After Visit Summary and PPPS were made available to the patient.    Follow Up:   Next Medicare Wellness visit to be scheduled in 1 year.       Additional E&M Note during same encounter follows:  Patient has multiple medical problems which are significant and separately identifiable that require additional work above and beyond the Medicare Wellness Visit.      Chief Complaint  Hospital Follow Up Visit ([facility] yesterday sob chest pain ) and Medicare Wellness-subsequent    Subjective        HPI  Joe DEBBY Fung is also being seen today for patient was scheduled for annual physical next week, but went to the ER recently with some epigastric abdominal and bilateral lower chest pain and  shortness of breath at rest.  He was evaluated in the ER, AMI was ruled out, and instead of having the patient admitted to the hospital, it was decided that he would be discharged home and worked up as an outpatient.  He has had multiple episodes of substernal chest discomfort that lasts anywhere from 30 minutes to 3 hours over the last couple weeks but is refused to go to the emergency room because he was out of town or felt like symptoms got better and did not wish to go.  In addition, the patient did see a new cardiologist about 2 weeks ago, and his lab work showed an elevated bilirubin, but no explanation has been found yet, and he was advised to follow-up with me about this.  The patient says his appetite is unchanged and he denies any right upper quadrant pain or symptoms of biliary colic, unless the lower substernal chest pain that radiates into the epigastric area could be this, and we did discuss that possibility.  Granted, if he does not have any evidence of acute cholecystitis or pancreatitis, it does behoove us to get his cardiac status checked out first, but the elevated bilirubin is definitely a concern, and needs workup.  He denies any dark urine, and has not had any vomiting or swallowing problems.    Review of Systems   Constitutional:  Negative for chills and fever.   HENT:  Negative for sore throat and trouble swallowing.    Eyes:  Positive for visual disturbance.   Respiratory:  Positive for chest tightness and shortness of breath. Negative for cough, choking, wheezing and stridor.    Cardiovascular:  Positive for chest pain. Negative for palpitations and leg swelling.   Gastrointestinal:  Positive for abdominal pain. Negative for abdominal distention, blood in stool, constipation, diarrhea, nausea and vomiting.   Endocrine: Negative for polydipsia, polyphagia and polyuria.   Genitourinary:  Negative for dysuria and hematuria.   Musculoskeletal:  Negative for arthralgias, back pain, gait problem,  "joint swelling, myalgias and neck pain.   Skin:  Negative for rash and wound.   Neurological:  Negative for tremors, seizures, syncope, facial asymmetry and speech difficulty.   Hematological:  Negative for adenopathy.   Psychiatric/Behavioral:  Negative for dysphoric mood and suicidal ideas. The patient is not nervous/anxious.        Objective   Vital Signs:  /78 (BP Location: Left arm, Patient Position: Sitting, Cuff Size: Adult)   Pulse 77   Ht 177.8 cm (70\")   Wt 102 kg (224 lb)   SpO2 93%   BMI 32.14 kg/m²     Physical Exam  Constitutional:       General: He is not in acute distress.     Appearance: He is obese. He is not toxic-appearing.   HENT:      Head: Normocephalic and atraumatic.      Right Ear: Ear canal and external ear normal.      Left Ear: Ear canal and external ear normal.      Nose: Nose normal. No rhinorrhea.      Mouth/Throat:      Mouth: Mucous membranes are moist.      Pharynx: Oropharynx is clear. No posterior oropharyngeal erythema.   Eyes:      General: Scleral icterus present.         Right eye: No discharge.         Left eye: No discharge.      Extraocular Movements: Extraocular movements intact.      Conjunctiva/sclera: Conjunctivae normal.      Pupils: Pupils are equal, round, and reactive to light.   Neck:      Vascular: No carotid bruit.   Cardiovascular:      Rate and Rhythm: Normal rate and regular rhythm.      Pulses: Normal pulses.      Heart sounds: Normal heart sounds. No murmur heard.     No gallop.   Pulmonary:      Effort: Pulmonary effort is normal.      Breath sounds: Normal breath sounds. No wheezing, rhonchi or rales.   Chest:      Chest wall: No tenderness.   Abdominal:      General: Bowel sounds are normal. There is no distension.      Palpations: Abdomen is soft. There is no mass.      Tenderness: There is no abdominal tenderness. There is no guarding or rebound.   Musculoskeletal:         General: No swelling or deformity. Normal range of motion.      " Cervical back: Normal range of motion. No rigidity.      Right lower leg: No edema.      Left lower leg: No edema.   Lymphadenopathy:      Cervical: No cervical adenopathy.   Skin:     General: Skin is warm and dry.      Capillary Refill: Capillary refill takes less than 2 seconds.      Coloration: Skin is jaundiced (Mild). Skin is not pale.      Findings: No erythema or rash.   Neurological:      General: No focal deficit present.      Mental Status: He is alert and oriented to person, place, and time.      Cranial Nerves: No cranial nerve deficit.      Motor: No weakness.      Coordination: Coordination normal.      Gait: Gait normal.   Psychiatric:         Mood and Affect: Mood normal.         Behavior: Behavior normal.         Thought Content: Thought content normal.         Judgment: Judgment normal.                         Assessment and Plan   Diagnoses and all orders for this visit:    1. Encounter for subsequent annual wellness visit (AWV) in Medicare patient (Primary)    2. Type 2 diabetes mellitus with peripheral neuropathy  -     POC Glycosylated Hemoglobin (Hb A1C)  A1c was 5.4, and patient has been having recurrent hypoglycemia in the early morning hours.  In addition, he has recently developed jaundice with a bilirubin level between 2.8 and 3.2, with no clear explanation.  We are therefore going to stop his metformin for now, and they will adjust his basal insulin in order to keep his fasting glucose in an appropriate level.  3. Jaundice  -     US Gallbladder; Future  Ultrasound of gallbladder will be done as soon as possible, and if it is not diagnostic and we will pursue a CT scan of the abdomen, differential diagnosis was discussed.  Very concerned about the possibility of malignancy given the fact that he does not have constant pain in the epigastric area, and really most of his discomfort has been in the chest, with a little radiation in the epigastric area sometimes.  However, as I explained to  him and his wife, there are dozens of other reasons he could have jaundice as well, so the workup will be started with this  4. Epigastric pain  -     US Gallbladder; Future    5. Chest pain, unspecified type  I told the patient and his wife that having chest pain and shortness of breath without any exertion is by definition unstable angina until proven otherwise, and I think he should have been admitted to the hospital, and if the symptoms return that he should go back to the hospital and request very strongly to be admitted and worked up then rather than trying to work this up as an outpatient, and posing potentially life-threatening risk to himself.  Nonetheless, he really does not want to go in the hospital and at this time is asymptomatic.  He will follow-up with cardiology testing next week as scheduled unless things change before then  6. Hyperlipidemia LDL goal <70    7. Lower urinary tract symptoms due to benign prostatic hyperplasia    8. Chronic systolic heart failure  This problem is comanaged with cardiology and is currently stable on carvedilol 6.25 mg twice a day, Farxiga 10 mg daily, Bumex 2 mg daily as needed, and I was under the impression that the patient was on Entresto but unfortunately this was not documented in his record, so we will have to clarify this at a later time.  9. Encounter for long-term (current) use of other medications    10. Overactive bladder    11. Gastroesophageal reflux disease without esophagitis    12. Essential hypertension    13. Bilateral nonexudative age-related macular degeneration, unspecified stage  Patient says he has decided not to follow-up with ophthalmology for any more treatment or testing, but he is going blind that he understands that, and this applies to the diabetic retinopathy below as well.  Patient says he had multiple surgeries and continues to lose his vision and simply got tired of going back and did not think there is any value in following up  further and therefore refuses to go back to ophthalmology  14. Cardiomyopathy, dilated    15. Diabetic retinopathy of both eyes associated with type 2 diabetes mellitus, macular edema presence unspecified, unspecified retinopathy severity  See details above  Other orders  -     Pneumococcal Conjugate Vaccine 20-Valent (PCV20)             Follow Up   No follow-ups on file.  Patient was given instructions and counseling regarding his condition or for health maintenance advice. Please see specific information pulled into the AVS if appropriate.

## 2024-04-03 LAB
EXPIRATION DATE: NORMAL
HBA1C MFR BLD: 5.4 % (ref 4.5–5.7)
Lab: NORMAL

## 2024-04-11 ENCOUNTER — TELEPHONE (OUTPATIENT)
Dept: FAMILY MEDICINE CLINIC | Facility: CLINIC | Age: 79
End: 2024-04-11

## 2024-04-11 DIAGNOSIS — K74.60 CIRRHOSIS OF LIVER WITHOUT ASCITES, UNSPECIFIED HEPATIC CIRRHOSIS TYPE: Primary | ICD-10-CM

## 2024-04-11 NOTE — TELEPHONE ENCOUNTER
Please let pt know that his ultrasound showed that he DOES have some gallstones. However, the Radiologist also said that the ultrasound appears to show some cirrhosis of the liver. Cirrhosis can develop for various reasons, and is not only seen in alcoholics, but also happens when patients have fatty liver or other issues. Thus, we need to refer him to a GI specialist for a consultation about the cirrhosis, as well as a General Surgeon for a consultation about the gallstones (unless he is not having any SYMPTOMS from the gallstones--like upset stomach or stomach pain after eating, nausea, bloating--may be in upperr mid or right side of abdomen 1-2 hours after eating). Let me know if he is having symptoms or not--if not, then we will just get GI consultation         Note       spoke with wife Winter states Joe can't hear well Winter stated Joe is not having symptoms  At this time

## 2024-04-11 NOTE — TELEPHONE ENCOUNTER
Please let pt know that his ultrasound showed that he DOES have some gallstones. However, the Radiologist also said that the ultrasound appears to show some cirrhosis of the liver. Cirrhosis can develop for various reasons, and is not only seen in alcoholics, but also happens when patients have fatty liver or other issues. Thus, we need to refer him to a GI specialist for a consultation about the cirrhosis, as well as a General Surgeon for a consultation about the gallstones (unless he is not having any SYMPTOMS from the gallstones--like upset stomach or stomach pain after eating, nausea, bloating--may be in upperr mid or right side of abdomen 1-2 hours after eating). Let me know if he is having symptoms or not--if not, then we will just get GI consultation

## 2024-04-20 ENCOUNTER — OUTSIDE FACILITY SERVICE (OUTPATIENT)
Dept: CARDIOLOGY | Facility: CLINIC | Age: 79
End: 2024-04-20
Payer: MEDICARE

## 2024-04-20 PROCEDURE — 99232 SBSQ HOSP IP/OBS MODERATE 35: CPT | Performed by: INTERNAL MEDICINE

## 2024-04-22 ENCOUNTER — READMISSION MANAGEMENT (OUTPATIENT)
Dept: CALL CENTER | Facility: HOSPITAL | Age: 79
End: 2024-04-22
Payer: MEDICARE

## 2024-04-22 NOTE — OUTREACH NOTE
Prep Survey      Flowsheet Row Responses   [facility] facility patient discharged from? Non-BH   Is LACE score < 7 ? Non-BH Discharge   Eligibility [facility]   Date of Discharge 04/20/24   Discharge Disposition Home or Self Care   Discharge diagnosis Stenting   Does the patient have one of the following disease processes/diagnoses(primary or secondary)? Other   Prep survey completed? Yes            Anna VITAL - Registered Nurse

## 2024-04-23 ENCOUNTER — TRANSITIONAL CARE MANAGEMENT TELEPHONE ENCOUNTER (OUTPATIENT)
Dept: CALL CENTER | Facility: HOSPITAL | Age: 79
End: 2024-04-23
Payer: MEDICARE

## 2024-04-23 NOTE — OUTREACH NOTE
Call Center TCM Note      Flowsheet Row Responses   [facility] patient discharged from? Non-   Does the patient have one of the following disease processes/diagnoses(primary or secondary)? Other   TCM attempt successful? Yes   Call start time 1321   Call end time 1327   Discharge diagnosis Stenting   Person spoke with today (if not patient) and relationship patient's wife   Meds reviewed with patient/caregiver? Yes   Is the patient having any side effects they believe may be caused by any medication additions or changes? No   Does the patient have all medications ordered at discharge? Yes   Is the patient taking all medications as directed (includes completed medication regime)? Yes   Comments Patient has a hospital followup scheduled with Dr. Irene on 4/26/2024   Does the patient have an appointment with their PCP within 7-14 days of discharge? Yes   Psychosocial issues? No   Did the patient receive a copy of their discharge instructions? Yes   Nursing interventions Reviewed instructions with patient   What is the patient's perception of their health status since discharge? Improving   Is the patient/caregiver able to teach back signs and symptoms related to disease process for when to call PCP? Yes   Is the patient/caregiver able to teach back signs and symptoms related to disease process for when to call 911? Yes   Is the patient/caregiver able to teach back the hierarchy of who to call/visit for symptoms/problems? PCP, Specialist, Home health nurse, Urgent Care, ED, 911 Yes   If the patient is a current smoker, are they able to teach back resources for cessation? Not a smoker   TCM call completed? Yes   Call end time 1327   Would this patient benefit from a Referral to Amb Social Work? No   Is the patient interested in additional calls from an ambulatory ? No            Sam Rodrigez RN    4/23/2024, 13:27 EDT

## 2024-04-26 ENCOUNTER — OFFICE VISIT (OUTPATIENT)
Dept: FAMILY MEDICINE CLINIC | Facility: CLINIC | Age: 79
End: 2024-04-26
Payer: MEDICARE

## 2024-04-26 VITALS
BODY MASS INDEX: 30.16 KG/M2 | SYSTOLIC BLOOD PRESSURE: 116 MMHG | HEIGHT: 70 IN | OXYGEN SATURATION: 94 % | DIASTOLIC BLOOD PRESSURE: 70 MMHG | WEIGHT: 210.7 LBS | HEART RATE: 72 BPM

## 2024-04-26 DIAGNOSIS — K80.20 ASYMPTOMATIC GALLSTONES: ICD-10-CM

## 2024-04-26 DIAGNOSIS — E11.9 INSULIN-TREATED TYPE 2 DIABETES MELLITUS: ICD-10-CM

## 2024-04-26 DIAGNOSIS — I21.4 NSTEMI (NON-ST ELEVATED MYOCARDIAL INFARCTION): ICD-10-CM

## 2024-04-26 DIAGNOSIS — I71.21 ANEURYSM OF ASCENDING AORTA WITHOUT RUPTURE: ICD-10-CM

## 2024-04-26 DIAGNOSIS — I25.10 ATHEROSCLEROSIS OF NATIVE CORONARY ARTERY OF NATIVE HEART WITHOUT ANGINA PECTORIS: Primary | ICD-10-CM

## 2024-04-26 DIAGNOSIS — Z95.5 PRESENCE OF DRUG COATED STENT IN CORONARY ARTERY: ICD-10-CM

## 2024-04-26 DIAGNOSIS — E11.42 TYPE 2 DIABETES MELLITUS WITH PERIPHERAL NEUROPATHY: ICD-10-CM

## 2024-04-26 DIAGNOSIS — K74.60 CIRRHOSIS OF LIVER WITHOUT ASCITES, UNSPECIFIED HEPATIC CIRRHOSIS TYPE: ICD-10-CM

## 2024-04-26 DIAGNOSIS — Z79.4 INSULIN-TREATED TYPE 2 DIABETES MELLITUS: ICD-10-CM

## 2024-04-26 RX ORDER — CLOPIDOGREL BISULFATE 75 MG/1
75 TABLET ORAL DAILY
COMMUNITY

## 2024-04-26 RX ORDER — TRAMADOL HYDROCHLORIDE 50 MG/1
50 TABLET ORAL EVERY 6 HOURS PRN
COMMUNITY

## 2024-04-26 RX ORDER — SPIRONOLACTONE 25 MG/1
25 TABLET ORAL DAILY
COMMUNITY

## 2024-04-28 PROBLEM — I25.10 ATHEROSCLEROSIS OF NATIVE CORONARY ARTERY OF NATIVE HEART WITHOUT ANGINA PECTORIS: Status: ACTIVE | Noted: 2024-04-28

## 2024-04-28 PROBLEM — Z95.5 PRESENCE OF DRUG COATED STENT IN CORONARY ARTERY: Status: ACTIVE | Noted: 2024-04-28

## 2024-04-28 PROBLEM — K74.60 CIRRHOSIS OF LIVER WITHOUT ASCITES: Status: ACTIVE | Noted: 2024-04-28

## 2024-04-28 PROBLEM — K80.20 ASYMPTOMATIC GALLSTONES: Status: ACTIVE | Noted: 2024-04-28

## 2024-04-28 PROBLEM — I71.21 ANEURYSM OF ASCENDING AORTA WITHOUT RUPTURE: Status: ACTIVE | Noted: 2024-04-28

## 2024-04-28 NOTE — PROGRESS NOTES
Transitional Care Follow Up Visit  Subjective     Joe Fung is a 79 y.o. male who presents for a transitional care management visit.    Within 48 business hours after discharge our office contacted him via telephone to coordinate his care and needs.      I reviewed and discussed the details of that call along with the discharge summary, hospital problems, inpatient lab results, inpatient diagnostic studies, and consultation reports with Joe.     Current outpatient and discharge medications have been reconciled for the patient.  Reviewed by: Vimal Irene MD          4/22/2024    11:19 AM   Date of TCM Phone Call   [facility]   Date of Discharge 4/20/2024   Discharge Disposition Home or Self Care     Risk for Readmission (LACE) No data recorded    History of Present Illness   Course During Hospital Stay: Patient was admitted to [facility] on April 19, 2024 and discharged on April 20, 2024, and contacted by our staff for TCM on April 23, 2024.  The patient was admitted when he had cardiac catheterization and suffered a myocardial infarction during the procedure, during which he did have stent placed, a drug-eluting stent, and then was advised to take dual antiplatelet therapy for 1 year, and then cut back to aspirin daily after that.  Patient says he was sedated at the time of having the heart catheterization so he did not even know it had the heart attack.    In addition, the patient recently was here with jaundice, and his repeat labs at the hospital showed a bilirubin of 2.6, with a drop in hemoglobin down to 10.9.  His weight is dropped 14 pounds over the last 3 weeks.  The patient and his wife state that he continues to take Lantus twice a day, but is cut down the dose to 30 units twice a day, but he frequently has glucose levels that dropped into the 60s in the early morning hours.  His ultrasound showed gallstones and cirrhosis of the liver, and he has appointment with GI for  consultation coming up soon, but he denies any gallstone symptoms at this time.    The patient was found to have an aneurysm of his thoracic aorta that was 4.9 cm, slightly larger than it had been, and will be seeing someone vascular surgery for consultation soon.  He and his wife are checking on his cardiac rehab.  His blood pressures been running 111-136/71-86     The following portions of the patient's history were reviewed and updated as appropriate: allergies, current medications, past family history, past medical history, past social history, past surgical history, and problem list.    Review of Systems   Constitutional:  Negative for activity change, appetite change, chills and fever.   HENT:  Negative for sore throat and trouble swallowing.    Eyes:  Negative for visual disturbance.   Respiratory:  Negative for cough, choking, chest tightness, shortness of breath and wheezing.    Cardiovascular:  Negative for chest pain, palpitations and leg swelling.   Gastrointestinal:  Negative for abdominal pain, anal bleeding, blood in stool, constipation, diarrhea, nausea and vomiting.   Endocrine: Negative for polydipsia, polyphagia and polyuria.   Genitourinary:  Negative for dysuria and hematuria.   Musculoskeletal:  Negative for arthralgias, back pain, joint swelling and myalgias.   Skin:  Negative for rash.   Neurological:  Negative for seizures, syncope, facial asymmetry, speech difficulty, light-headedness and headaches.   Hematological:  Negative for adenopathy.   Psychiatric/Behavioral:  Negative for dysphoric mood and sleep disturbance. The patient is not nervous/anxious.        Objective   Physical Exam  Constitutional:       General: He is not in acute distress.     Appearance: He is obese. He is not toxic-appearing.   HENT:      Head: Normocephalic and atraumatic.      Right Ear: Ear canal and external ear normal.      Left Ear: Ear canal and external ear normal.      Nose: Nose normal.      Mouth/Throat:       Mouth: Mucous membranes are moist.      Pharynx: Oropharynx is clear.   Eyes:      General: No scleral icterus.     Extraocular Movements: Extraocular movements intact.      Conjunctiva/sclera: Conjunctivae normal.      Pupils: Pupils are equal, round, and reactive to light.   Neck:      Vascular: No carotid bruit.   Cardiovascular:      Rate and Rhythm: Normal rate and regular rhythm.      Pulses: Normal pulses.      Heart sounds: Normal heart sounds.   Pulmonary:      Effort: Pulmonary effort is normal.      Breath sounds: Normal breath sounds.   Chest:      Chest wall: No tenderness.   Abdominal:      General: Bowel sounds are normal. There is no distension.      Palpations: Abdomen is soft. There is no mass.      Tenderness: There is no abdominal tenderness. There is no guarding or rebound.   Musculoskeletal:         General: No swelling or deformity. Normal range of motion.      Cervical back: Normal range of motion. No rigidity.      Right lower leg: No edema.      Left lower leg: No edema.   Lymphadenopathy:      Cervical: No cervical adenopathy.   Skin:     General: Skin is warm and dry.      Capillary Refill: Capillary refill takes less than 2 seconds.      Coloration: Skin is not pale.      Findings: No bruising, erythema or rash.   Neurological:      General: No focal deficit present.      Mental Status: He is alert and oriented to person, place, and time.      Cranial Nerves: No cranial nerve deficit.      Motor: No weakness.      Coordination: Coordination normal.      Gait: Gait normal.   Psychiatric:         Mood and Affect: Mood normal.         Behavior: Behavior normal.         Thought Content: Thought content normal.         Judgment: Judgment normal.         Assessment & Plan   Problems Addressed this Visit          Cardiac and Vasculature    Atherosclerosis of native coronary artery of native heart without angina pectoris - Primary    Relevant Medications    clopidogrel (PLAVIX) 75 MG tablet        Endocrine and Metabolic    Insulin-treated type 2 diabetes mellitus       Gastrointestinal Abdominal     Cirrhosis of liver without ascites    Asymptomatic gallstones       Other    Presence of drug coated stent in coronary artery    Aneurysm of ascending aorta without rupture     Other Visit Diagnoses       NSTEMI (non-ST elevated myocardial infarction)        Relevant Medications    clopidogrel (PLAVIX) 75 MG tablet          Diagnoses         Codes Comments    Atherosclerosis of native coronary artery of native heart without angina pectoris    -  Primary ICD-10-CM: I25.10  ICD-9-CM: 414.01     NSTEMI (non-ST elevated myocardial infarction)     ICD-10-CM: I21.4  ICD-9-CM: 410.70     Presence of drug coated stent in coronary artery     ICD-10-CM: Z95.5  ICD-9-CM: V45.82     Cirrhosis of liver without ascites, unspecified hepatic cirrhosis type     ICD-10-CM: K74.60  ICD-9-CM: 571.5     Asymptomatic gallstones     ICD-10-CM: K80.20  ICD-9-CM: 574.20     Type 2 diabetes mellitus with peripheral neuropathy     ICD-10-CM: E11.42  ICD-9-CM: 250.60, 357.2     Insulin-treated type 2 diabetes mellitus     ICD-10-CM: E11.9, Z79.4  ICD-9-CM: 250.00, V58.67     Aneurysm of ascending aorta without rupture     ICD-10-CM: I71.21  ICD-9-CM: 441.2           Highly complex medical decision making.  Patient had an acute myocardial infarction during the time of heart catheterization with drug-eluting stent placement.  He cannot really tell any significant changes in his overall status other than he may be a little more tired than usual, but he is always battled chronic fatigue ever since he was diagnosed with heart failure a few years ago.  The patient is going to cut back on his Lantus to 24 units twice a day, and I told him and his wife that if his overall dose stays less than 50, he may be able to switch to using the medicine once a day instead of twice a day.  I reminded them and emphasized that he should reduce the dose of  his Lantus if his fasting glucose continues to drop too low  The difficulty in remembering this when the glucose is high in the evening was explained, but I told him to try to stay on track with adjusting the long-acting basal insulin dose based on the fasting glucose level, and if his glucose runs high at other times of the day, remember to make sure that it has been at least 2 hours after he has eaten, since they may be seeing those high readings only 15 to 60 minutes after a meal now that he has a continuous glucose monitor, and unless the level is running well over 182 hours after a meal, we may not want to make any additional adjustments.  In fact, the patient's A1c was less than 5.5 the last 2 times has been checked.  The patient was advised to stop the metformin at his last appointment because that was the first time the jaundice was noted, and he had lost some weight because of loss of appetite, but he says his blood sugar shot up a lot when he stopped taking the metformin so he resumed taking it.  Therefore we did spend quite a bit of time discussing his glycemic management the day, and he will continue his other medications, as well as follow-up with cardiology and vascular surgery, and start cardiac rehab as advised.  He will also keep the appointment with GI soon to evaluate the cirrhosis

## 2024-05-02 PROBLEM — Z79.4 INSULIN LONG-TERM USE: Status: ACTIVE | Noted: 2024-05-02

## 2024-05-06 ENCOUNTER — OFFICE VISIT (OUTPATIENT)
Dept: FAMILY MEDICINE CLINIC | Facility: CLINIC | Age: 79
End: 2024-05-06
Payer: MEDICARE

## 2024-05-06 VITALS
HEART RATE: 58 BPM | SYSTOLIC BLOOD PRESSURE: 118 MMHG | BODY MASS INDEX: 30.06 KG/M2 | DIASTOLIC BLOOD PRESSURE: 70 MMHG | HEIGHT: 70 IN | WEIGHT: 210 LBS | OXYGEN SATURATION: 96 %

## 2024-05-06 DIAGNOSIS — Z79.4 INSULIN LONG-TERM USE: ICD-10-CM

## 2024-05-06 DIAGNOSIS — R53.83 OTHER FATIGUE: ICD-10-CM

## 2024-05-06 DIAGNOSIS — E11.42 TYPE 2 DIABETES MELLITUS WITH PERIPHERAL NEUROPATHY: ICD-10-CM

## 2024-05-06 DIAGNOSIS — D53.9 DEFICIENCY ANEMIA: ICD-10-CM

## 2024-05-06 DIAGNOSIS — R26.89 IMPAIRMENT OF BALANCE: ICD-10-CM

## 2024-05-06 DIAGNOSIS — K74.60 CIRRHOSIS OF LIVER WITHOUT ASCITES, UNSPECIFIED HEPATIC CIRRHOSIS TYPE: ICD-10-CM

## 2024-05-06 DIAGNOSIS — R29.6 FREQUENT FALLS: ICD-10-CM

## 2024-05-06 DIAGNOSIS — Z79.899 ENCOUNTER FOR LONG-TERM (CURRENT) USE OF OTHER MEDICATIONS: ICD-10-CM

## 2024-05-06 DIAGNOSIS — R42 DIZZINESS: Primary | ICD-10-CM

## 2024-05-06 PROCEDURE — 3078F DIAST BP <80 MM HG: CPT | Performed by: FAMILY MEDICINE

## 2024-05-06 PROCEDURE — 3074F SYST BP LT 130 MM HG: CPT | Performed by: FAMILY MEDICINE

## 2024-05-06 PROCEDURE — 99214 OFFICE O/P EST MOD 30 MIN: CPT | Performed by: FAMILY MEDICINE

## 2024-05-06 RX ORDER — GABAPENTIN 100 MG/1
100 CAPSULE ORAL 2 TIMES DAILY PRN
Qty: 270 CAPSULE | Refills: 1 | Status: SHIPPED | OUTPATIENT
Start: 2024-05-06

## 2024-05-07 LAB
ALBUMIN SERPL-MCNC: 4.4 G/DL (ref 3.8–4.8)
ALBUMIN/GLOB SERPL: 1.9 {RATIO} (ref 1.2–2.2)
ALP SERPL-CCNC: 97 IU/L (ref 44–121)
ALT SERPL-CCNC: 14 IU/L (ref 0–44)
AST SERPL-CCNC: 22 IU/L (ref 0–40)
BASOPHILS # BLD AUTO: 0 X10E3/UL (ref 0–0.2)
BASOPHILS NFR BLD AUTO: 1 %
BILIRUB SERPL-MCNC: 1.8 MG/DL (ref 0–1.2)
BUN SERPL-MCNC: 29 MG/DL (ref 8–27)
BUN/CREAT SERPL: 29 (ref 10–24)
CALCIUM SERPL-MCNC: 9.3 MG/DL (ref 8.6–10.2)
CHLORIDE SERPL-SCNC: 101 MMOL/L (ref 96–106)
CO2 SERPL-SCNC: 27 MMOL/L (ref 20–29)
CREAT SERPL-MCNC: 1.01 MG/DL (ref 0.76–1.27)
EGFRCR SERPLBLD CKD-EPI 2021: 77 ML/MIN/1.73
EOSINOPHIL # BLD AUTO: 0.2 X10E3/UL (ref 0–0.4)
EOSINOPHIL NFR BLD AUTO: 3 %
ERYTHROCYTE [DISTWIDTH] IN BLOOD BY AUTOMATED COUNT: 15.7 % (ref 11.6–15.4)
FERRITIN SERPL-MCNC: 25 NG/ML (ref 30–400)
FOLATE SERPL-MCNC: >20 NG/ML
GLOBULIN SER CALC-MCNC: 2.3 G/DL (ref 1.5–4.5)
GLUCOSE SERPL-MCNC: 161 MG/DL (ref 70–99)
HCT VFR BLD AUTO: 37 % (ref 37.5–51)
HGB BLD-MCNC: 11.4 G/DL (ref 13–17.7)
IMM GRANULOCYTES # BLD AUTO: 0 X10E3/UL (ref 0–0.1)
IMM GRANULOCYTES NFR BLD AUTO: 0 %
IRON SERPL-MCNC: 61 UG/DL (ref 38–169)
LYMPHOCYTES # BLD AUTO: 1.9 X10E3/UL (ref 0.7–3.1)
LYMPHOCYTES NFR BLD AUTO: 26 %
MAGNESIUM SERPL-MCNC: 2.3 MG/DL (ref 1.6–2.3)
MCH RBC QN AUTO: 28.2 PG (ref 26.6–33)
MCHC RBC AUTO-ENTMCNC: 30.8 G/DL (ref 31.5–35.7)
MCV RBC AUTO: 92 FL (ref 79–97)
MONOCYTES # BLD AUTO: 0.6 X10E3/UL (ref 0.1–0.9)
MONOCYTES NFR BLD AUTO: 8 %
NEUTROPHILS # BLD AUTO: 4.7 X10E3/UL (ref 1.4–7)
NEUTROPHILS NFR BLD AUTO: 62 %
PLATELET # BLD AUTO: 222 X10E3/UL (ref 150–450)
POTASSIUM SERPL-SCNC: 4.4 MMOL/L (ref 3.5–5.2)
PROT SERPL-MCNC: 6.7 G/DL (ref 6–8.5)
RBC # BLD AUTO: 4.04 X10E6/UL (ref 4.14–5.8)
SODIUM SERPL-SCNC: 143 MMOL/L (ref 134–144)
T4 FREE SERPL-MCNC: 1.05 NG/DL (ref 0.82–1.77)
TSH SERPL DL<=0.005 MIU/L-ACNC: 2.28 UIU/ML (ref 0.45–4.5)
VIT B12 SERPL-MCNC: 764 PG/ML (ref 232–1245)
WBC # BLD AUTO: 7.4 X10E3/UL (ref 3.4–10.8)

## 2024-05-20 ENCOUNTER — TELEPHONE (OUTPATIENT)
Dept: CARDIOLOGY | Facility: CLINIC | Age: 79
End: 2024-05-20
Payer: MEDICARE

## 2024-05-20 RX ORDER — CLOPIDOGREL BISULFATE 75 MG/1
75 TABLET ORAL DAILY
Qty: 30 TABLET | Status: CANCELLED | OUTPATIENT
Start: 2024-05-20

## 2024-05-20 NOTE — TELEPHONE ENCOUNTER
Spoke with wife, Winter, and she advised Mr. Fung will see Dr. Corey in a couple of weeks and they are needing refill on his plavix.  I advised her I would call [facility] and ask them to send in the prescription. Spoke with Buck with Madhav's office and she is going to send in plavix to [facility].

## 2024-06-11 RX ORDER — VENLAFAXINE HYDROCHLORIDE 37.5 MG/1
37.5 CAPSULE, EXTENDED RELEASE ORAL DAILY
Qty: 90 CAPSULE | Refills: 3 | Status: SHIPPED | OUTPATIENT
Start: 2024-06-11

## 2024-06-11 RX ORDER — VENLAFAXINE HYDROCHLORIDE 37.5 MG/1
CAPSULE, EXTENDED RELEASE ORAL
Qty: 90 CAPSULE | Refills: 0 | Status: SHIPPED | OUTPATIENT
Start: 2024-06-11 | End: 2024-06-11 | Stop reason: SDUPTHER

## 2024-06-13 ENCOUNTER — TELEPHONE (OUTPATIENT)
Dept: FAMILY MEDICINE CLINIC | Facility: CLINIC | Age: 79
End: 2024-06-13
Payer: MEDICARE

## 2024-06-13 NOTE — TELEPHONE ENCOUNTER
Pts wife called. Stated pt had been vomiting since last night after taking a tramadol. I advised to go to the ER. Another concern with vomiting that long would be dehydration

## 2024-06-19 ENCOUNTER — TELEPHONE (OUTPATIENT)
Dept: FAMILY MEDICINE CLINIC | Facility: CLINIC | Age: 79
End: 2024-06-19
Payer: MEDICARE

## 2024-06-19 DIAGNOSIS — E11.42 TYPE 2 DIABETES MELLITUS WITH PERIPHERAL NEUROPATHY: ICD-10-CM

## 2024-06-19 RX ORDER — GABAPENTIN 300 MG/1
CAPSULE ORAL
Qty: 90 CAPSULE | Refills: 1 | Status: SHIPPED | OUTPATIENT
Start: 2024-06-19

## 2024-06-19 NOTE — TELEPHONE ENCOUNTER
Pt did not schedule next follow up appt when he left last time, please call and schedule when it's due in October or November

## 2024-07-01 ENCOUNTER — TELEPHONE (OUTPATIENT)
Dept: FAMILY MEDICINE CLINIC | Facility: CLINIC | Age: 79
End: 2024-07-01

## 2024-07-01 NOTE — TELEPHONE ENCOUNTER
Caller: KYREE RAMIREZ    Relationship to patient: Emergency Contact    Best call back number: [phone]     Patient is needing: PATIENTS WIFE STATES [facility] FAXED OVER PAPERWORK THAT NEEDS COMPLETED.     SHE STATES IT WAS SENT OVER A WEEK AGO AND SHE WOULD LIKE AN UPDATE.

## 2024-08-19 RX ORDER — FINASTERIDE 5 MG/1
5 TABLET, FILM COATED ORAL DAILY
Qty: 90 TABLET | Refills: 1 | Status: SHIPPED | OUTPATIENT
Start: 2024-08-19

## 2024-08-19 RX ORDER — ATORVASTATIN CALCIUM 20 MG/1
20 TABLET, FILM COATED ORAL DAILY
Qty: 90 TABLET | Refills: 1 | Status: SHIPPED | OUTPATIENT
Start: 2024-08-19

## 2024-08-19 RX ORDER — MONTELUKAST SODIUM 10 MG/1
10 TABLET ORAL EVERY EVENING
Qty: 90 TABLET | Refills: 1 | Status: SHIPPED | OUTPATIENT
Start: 2024-08-19

## 2024-09-13 RX ORDER — PANTOPRAZOLE SODIUM 40 MG/1
40 TABLET, DELAYED RELEASE ORAL DAILY
Qty: 90 TABLET | Refills: 0 | Status: SHIPPED | OUTPATIENT
Start: 2024-09-13

## 2024-09-20 RX ORDER — DAPAGLIFLOZIN 10 MG/1
10 TABLET, FILM COATED ORAL DAILY
Qty: 90 TABLET | Refills: 0 | Status: SHIPPED | OUTPATIENT
Start: 2024-09-20

## 2024-10-14 RX ORDER — INSULIN GLARGINE 100 [IU]/ML
INJECTION, SOLUTION SUBCUTANEOUS
Qty: 90 ML | Refills: 3 | Status: SHIPPED | OUTPATIENT
Start: 2024-10-14

## 2024-10-14 NOTE — TELEPHONE ENCOUNTER
There is a discrepancy in prescription and how he is reportedly taking, so please find out how he is taking this now, it was last entered a year ago and a lot has gone on since then. One says 40units daily and one says 25 units bid     Dr. Irene 30- 40 units bid per Mrs. Fung

## 2024-10-17 ENCOUNTER — OFFICE VISIT (OUTPATIENT)
Dept: FAMILY MEDICINE CLINIC | Facility: CLINIC | Age: 79
End: 2024-10-17
Payer: MEDICARE

## 2024-10-17 VITALS
HEIGHT: 70 IN | BODY MASS INDEX: 32.18 KG/M2 | WEIGHT: 224.8 LBS | SYSTOLIC BLOOD PRESSURE: 130 MMHG | DIASTOLIC BLOOD PRESSURE: 72 MMHG | HEART RATE: 68 BPM | OXYGEN SATURATION: 97 %

## 2024-10-17 DIAGNOSIS — F51.04 CHRONIC INSOMNIA: ICD-10-CM

## 2024-10-17 DIAGNOSIS — K74.60 CIRRHOSIS OF LIVER WITHOUT ASCITES, UNSPECIFIED HEPATIC CIRRHOSIS TYPE: ICD-10-CM

## 2024-10-17 DIAGNOSIS — I10 ESSENTIAL HYPERTENSION: ICD-10-CM

## 2024-10-17 DIAGNOSIS — K80.20 ASYMPTOMATIC GALLSTONES: ICD-10-CM

## 2024-10-17 DIAGNOSIS — N40.1 LOWER URINARY TRACT SYMPTOMS DUE TO BENIGN PROSTATIC HYPERPLASIA: ICD-10-CM

## 2024-10-17 DIAGNOSIS — R29.6 FREQUENT FALLS: ICD-10-CM

## 2024-10-17 DIAGNOSIS — D53.9 DEFICIENCY ANEMIA: ICD-10-CM

## 2024-10-17 DIAGNOSIS — Z79.4: ICD-10-CM

## 2024-10-17 DIAGNOSIS — K21.9 GASTROESOPHAGEAL REFLUX DISEASE WITHOUT ESOPHAGITIS: ICD-10-CM

## 2024-10-17 DIAGNOSIS — E11.3593 PROLIFERATIVE DIABETIC RETINOPATHY OF BOTH EYES ASSOCIATED WITH TYPE 2 DIABETES MELLITUS, UNSPECIFIED PROLIFERATIVE RETINOPATHY TYPE: ICD-10-CM

## 2024-10-17 DIAGNOSIS — Z79.899 ENCOUNTER FOR LONG-TERM (CURRENT) USE OF HIGH-RISK MEDICATION: ICD-10-CM

## 2024-10-17 DIAGNOSIS — E11.42 TYPE 2 DIABETES MELLITUS WITH PERIPHERAL NEUROPATHY: Primary | ICD-10-CM

## 2024-10-17 DIAGNOSIS — I50.22 CHRONIC SYSTOLIC HEART FAILURE: ICD-10-CM

## 2024-10-17 DIAGNOSIS — I51.89 DIASTOLIC DYSFUNCTION: ICD-10-CM

## 2024-10-17 DIAGNOSIS — E11.37X3: ICD-10-CM

## 2024-10-17 DIAGNOSIS — K76.0 FATTY LIVER DISEASE, NONALCOHOLIC: ICD-10-CM

## 2024-10-17 DIAGNOSIS — I25.10 ATHEROSCLEROSIS OF NATIVE CORONARY ARTERY OF NATIVE HEART WITHOUT ANGINA PECTORIS: ICD-10-CM

## 2024-10-17 PROCEDURE — 1160F RVW MEDS BY RX/DR IN RCRD: CPT | Performed by: FAMILY MEDICINE

## 2024-10-17 PROCEDURE — 99214 OFFICE O/P EST MOD 30 MIN: CPT | Performed by: FAMILY MEDICINE

## 2024-10-17 PROCEDURE — 1125F AMNT PAIN NOTED PAIN PRSNT: CPT | Performed by: FAMILY MEDICINE

## 2024-10-17 PROCEDURE — 1159F MED LIST DOCD IN RCRD: CPT | Performed by: FAMILY MEDICINE

## 2024-10-17 PROCEDURE — 3078F DIAST BP <80 MM HG: CPT | Performed by: FAMILY MEDICINE

## 2024-10-17 PROCEDURE — 3075F SYST BP GE 130 - 139MM HG: CPT | Performed by: FAMILY MEDICINE

## 2024-10-17 RX ORDER — GABAPENTIN 300 MG/1
300 CAPSULE ORAL NIGHTLY
Qty: 90 CAPSULE | Refills: 1 | Status: SHIPPED | OUTPATIENT
Start: 2024-10-17

## 2024-10-17 RX ORDER — DAPAGLIFLOZIN 10 MG/1
10 TABLET, FILM COATED ORAL DAILY
Qty: 90 TABLET | Refills: 1 | Status: SHIPPED | OUTPATIENT
Start: 2024-10-17

## 2024-10-17 RX ORDER — ATORVASTATIN CALCIUM 20 MG/1
20 TABLET, FILM COATED ORAL DAILY
Qty: 90 TABLET | Refills: 3 | Status: SHIPPED | OUTPATIENT
Start: 2024-10-17

## 2024-10-17 RX ORDER — ESZOPICLONE 2 MG/1
2 TABLET, FILM COATED ORAL NIGHTLY
Qty: 30 TABLET | Refills: 5 | Status: SHIPPED | OUTPATIENT
Start: 2024-10-17

## 2024-10-17 RX ORDER — CARVEDILOL 6.25 MG/1
6.25 TABLET ORAL 2 TIMES DAILY WITH MEALS
Qty: 180 TABLET | Refills: 3 | Status: SHIPPED | OUTPATIENT
Start: 2024-10-17

## 2024-10-17 RX ORDER — MONTELUKAST SODIUM 10 MG/1
10 TABLET ORAL EVERY EVENING
Qty: 90 TABLET | Refills: 3 | Status: SHIPPED | OUTPATIENT
Start: 2024-10-17

## 2024-10-17 RX ORDER — SPIRONOLACTONE 25 MG/1
25 TABLET ORAL DAILY
Qty: 90 TABLET | Refills: 1 | Status: SHIPPED | OUTPATIENT
Start: 2024-10-17

## 2024-10-17 RX ORDER — PANTOPRAZOLE SODIUM 40 MG/1
40 TABLET, DELAYED RELEASE ORAL DAILY
Qty: 90 TABLET | Refills: 3 | Status: SHIPPED | OUTPATIENT
Start: 2024-10-17

## 2024-10-17 RX ORDER — GABAPENTIN 100 MG/1
100 CAPSULE ORAL 2 TIMES DAILY PRN
Qty: 270 CAPSULE | Refills: 1 | Status: SHIPPED | OUTPATIENT
Start: 2024-10-17

## 2024-10-17 RX ORDER — FINASTERIDE 5 MG/1
5 TABLET, FILM COATED ORAL DAILY
Qty: 90 TABLET | Refills: 3 | Status: SHIPPED | OUTPATIENT
Start: 2024-10-17

## 2024-10-17 RX ORDER — VENLAFAXINE HYDROCHLORIDE 37.5 MG/1
37.5 CAPSULE, EXTENDED RELEASE ORAL DAILY
Qty: 90 CAPSULE | Refills: 3 | Status: SHIPPED | OUTPATIENT
Start: 2024-10-17

## 2024-10-17 NOTE — PROGRESS NOTES
Chief Complaint  Diabetes    Subjective      Joe Fung presents to [facility]  History of Present Illness  Patient is here for follow-up on diabetes and multiple other chronic problems.  The patient says he has been feeling okay lately without any significant changes in his overall status.  He said that his cardiologist did blood work about 2 weeks ago and they asked him to add an A1c, and the cardiologist said that he would forward me a copy of the results.  Unfortunately, and never received anything.  The patient does state that his fasting glucose tends to run , but his wife points out that he has postprandial glucose levels still often go to 280-300.  He says he eats about the same types of things every day and about the same quantities, so they really cannot make heads or tails out of this.  I did explain at his last appointment that they do not need to focus is much on the glucose readings in the 30 to 60 minutes after he eats, then look more at the 2-hour postprandial levels, because that may be as good as it gets without the risk of hypoglycemia.    I stepped out of the room to try to get a copy of the labs that were drawn 2 weeks ago, and unfortunately during that time the patient's wife had to leave to go to a nearby dental appointment, so I was not able to get any more information from her perspective.  However, the patient denied having any other concerns or problems at this time.  He said he had never gotten a notification about his test results from 2 weeks ago, so I encouraged him to contact the cardiology office and find out.  Fortunately my medical assistant was able to retrieve a copy of the lab reports and his hemoglobin A1c was 5.8.  I had initially told the patient that he was likely at a point where he simply needed mealtime insulin injections, due to the number of years that he had diabetes, but after I got the lab report I explained that I would not  "recommend any changes in his medications at this time, although I will refer to endocrinology to see if they have any other recommendations due to his wife's concerns about his temporary hyperglycemic episodes.  Since his fasting glucose is staying consistently in the low to normal range, I definitely do not recommend that he take any more.  In fact, they have been previously advised, face-to-face and in writing, to try to adjust his basal insulin in order to avoid letting the fasting glucose dropped down below the  range, as the risks outweigh the benefits.  Objective   Vital Signs:   Vitals:    10/17/24 1433   BP: 130/72   BP Location: Left arm   Patient Position: Sitting   Cuff Size: Adult   Pulse: 68   SpO2: 97%   Weight: 102 kg (224 lb 12.8 oz)   Height: 177.8 cm (70\")      /72 (BP Location: Left arm, Patient Position: Sitting, Cuff Size: Adult)   Pulse 68   Ht 177.8 cm (70\")   Wt 102 kg (224 lb 12.8 oz)   SpO2 97%   BMI 32.26 kg/m²     Body mass index is 32.26 kg/m².    Review of Systems   Constitutional:  Negative for activity change, appetite change, chills, fever, unexpected weight gain and unexpected weight loss.   HENT:  Negative for congestion, ear discharge, ear pain, mouth sores, nosebleeds, rhinorrhea, sinus pressure, sore throat, swollen glands, trouble swallowing and voice change.    Eyes:  Positive for blurred vision and visual disturbance (Chronically poor vision). Negative for double vision, pain and redness.   Respiratory:  Negative for cough, choking, chest tightness, shortness of breath and wheezing.    Cardiovascular:  Positive for leg swelling. Negative for chest pain and palpitations.        PND, orthopnea   Gastrointestinal:  Negative for abdominal distention, abdominal pain, blood in stool, constipation, diarrhea, nausea, vomiting and GERD.        Dysphagia, odynophagia   Endocrine: Negative for polydipsia, polyphagia and polyuria.   Genitourinary:  Positive for frequency " and nocturia. Negative for decreased urine volume, dysuria, hematuria and urinary incontinence.   Musculoskeletal:  Negative for arthralgias (unusual/atypica), back pain, gait problem, joint swelling, myalgias and neck pain.   Skin:  Negative for rash, skin lesions (worrisome/suspicious) and bruise.   Allergic/Immunologic: Negative for food allergies.   Neurological:  Positive for light-headedness and numbness. Negative for dizziness, tremors, seizures, syncope, weakness and headache.   Hematological:  Negative for adenopathy. Does not bruise/bleed easily.   Psychiatric/Behavioral:  Negative for sleep disturbance, suicidal ideas and depressed mood. The patient is not nervous/anxious.        Past History:  Medical History: has a past medical history of Adenomatous polyp of colon, Bilateral exudative age-related macular degeneration, BMI 33.0-33.9,adult, BPH associated with nocturia, Bursitis of left elbow, Chronic fatigue, Chronic systolic heart failure, CKD (chronic kidney disease), Diabetic retinopathy, Diastolic dysfunction, Dilated cardiomyopathy, Elevated lipids, Frequent falls, Insomnia, unspecified, Long term (current) use of insulin, Mild episode of recurrent major depressive disorder, Mixed hyperlipidemia, Nocturia, Other long term (current) drug therapy, Perennial allergic rhinitis, Peripheral polyneuropathy, Primary osteoarthritis involving multiple joints, Rotator cuff tear, Skin cancer, Small vessel coronary artery disease, Type 2 diabetes mellitus with diabetic neuropathy, with long-term current use of insulin, and Vitamin D insufficiency.   Surgical History: has a past surgical history that includes Knee arthroscopy (Bilateral); Rotator cuff repair; Elbow bursa surgery; and Cataract extraction.   Family History: family history includes Cancer in his mother; Diabetes in his maternal grandmother and mother; Heart disease in his maternal grandmother; Stroke in his mother.   Social History: reports that he  has never smoked. He has never used smokeless tobacco. He reports current alcohol use. He reports that he does not use drugs.      Current Outpatient Medications:     Alpha-Lipoic Acid 600 MG tablet, Take  by mouth. Indications: Muscle Cramps, Disp: , Rfl:     aspirin 81 MG chewable tablet, Chew 1 tablet Daily., Disp: , Rfl:     atorvastatin (LIPITOR) 20 MG tablet, Take 1 tablet by mouth Daily., Disp: 90 tablet, Rfl: 3    bumetanide (BUMEX) 2 MG tablet, Take 1 tablet by mouth Daily. Prn  Indications: High Blood Pressure, Disp: , Rfl:     carvedilol (COREG) 6.25 MG tablet, Take 1 tablet by mouth 2 (Two) Times a Day With Meals. Indications: High Blood Pressure, Disp: 180 tablet, Rfl: 3    cetirizine (zyrTEC) 10 MG tablet, Take 1 tablet by mouth Daily. Indications: Hayfever, Disp: , Rfl:     Cinnamon 500 MG capsule, Take  by mouth., Disp: , Rfl:     clopidogrel (PLAVIX) 75 MG tablet, Take 1 tablet by mouth Daily., Disp: , Rfl:     coenzyme Q10 100 MG capsule, Take  by mouth., Disp: , Rfl:     Continuous Glucose Monitor Sup kit, Use 1 kit Every 14 (Fourteen) Days. Apply one sensor every 14 days as instructed, Disp: 6 kit, Rfl: 3    dapagliflozin Propanediol (Farxiga) 10 MG tablet, Take 10 mg by mouth Daily. Indications: Type 2 Diabetes, Disp: 90 tablet, Rfl: 1    eszopiclone (LUNESTA) 2 MG tablet, Take 1 tablet by mouth Every Night. Take immediately before bedtime, Disp: 30 tablet, Rfl: 5    finasteride (PROSCAR) 5 MG tablet, Take 1 tablet by mouth Daily., Disp: 90 tablet, Rfl: 3    gabapentin (NEURONTIN) 100 MG capsule, Take 1 capsule by mouth 2 (Two) Times a Day As Needed (neuropathy pain). Take 1 po bid for neuropathy pain, and take 1 - 2 po qhs along with the 300mg pills as directed  Indications: Diabetes with Nerve Disease, Disp: 270 capsule, Rfl: 1    gabapentin (NEURONTIN) 300 MG capsule, Take 1 capsule by mouth Every Night. Indications: Diabetes with Nerve Disease, Disp: 90 capsule, Rfl: 1    Insulin Glargine  (Lantus SoloStar) 100 UNIT/ML injection pen, Inject 30-40 units SC bid AS DIRECTED (dose varies based on glucose readings), Disp: 90 mL, Rfl: 3    Insulin Pen Needle (Droplet Pen Needles) 31G X 8 MM misc, Inject 1 each under the skin into the appropriate area as directed 2 (Two) Times a Day., Disp: 200 each, Rfl: 3    lidocaine (LIDODERM) 5 %, Place 1 patch on the skin as directed by provider Daily. Remove & Discard patch within 12 hours or as directed by MD. Must leave off for 12 hours before applying new patch, Disp: 30 patch, Rfl: 11    Mirabegron ER (MYRBETRIQ) 50 MG tablet sustained-release 24 hour 24 hr tablet, Myrbetriq 50 mg tablet,extended release  Take 1 tablet every day by oral route in the morning., Disp: , Rfl:     montelukast (SINGULAIR) 10 MG tablet, Take 1 tablet by mouth Every Evening., Disp: 90 tablet, Rfl: 3    multivitamin with minerals tablet tablet, Take 1 tablet by mouth Daily. WITH FOOD, Disp: , Rfl:     pantoprazole (PROTONIX) 40 MG EC tablet, Take 1 tablet by mouth Daily., Disp: 90 tablet, Rfl: 3    spironolactone (ALDACTONE) 25 MG tablet, Take 1 tablet by mouth Daily., Disp: , Rfl:     traMADol (ULTRAM) 50 MG tablet, Take 1 tablet by mouth Every 6 (Six) Hours As Needed for Moderate Pain., Disp: , Rfl:     venlafaxine XR (EFFEXOR-XR) 37.5 MG 24 hr capsule, Take 1 capsule by mouth Daily. Indications: Major Depressive Disorder, Disp: 90 capsule, Rfl: 3    Allergies: Lortab [hydrocodone-acetaminophen]    Physical Exam  Constitutional:       General: He is not in acute distress.     Appearance: He is obese. He is not toxic-appearing.   HENT:      Head: Normocephalic and atraumatic.      Right Ear: Ear canal and external ear normal.      Left Ear: Ear canal and external ear normal.      Nose: Nose normal.      Mouth/Throat:      Mouth: Mucous membranes are moist.      Pharynx: Oropharynx is clear.   Eyes:      General: No scleral icterus.     Extraocular Movements: Extraocular movements intact.       Conjunctiva/sclera: Conjunctivae normal.      Pupils: Pupils are equal, round, and reactive to light.   Neck:      Vascular: No carotid bruit.   Cardiovascular:      Rate and Rhythm: Normal rate and regular rhythm.      Pulses: Normal pulses.      Heart sounds: Normal heart sounds. No murmur heard.     No gallop.   Pulmonary:      Effort: Pulmonary effort is normal.      Breath sounds: Normal breath sounds. No wheezing or rales.   Chest:      Chest wall: No tenderness.   Abdominal:      General: Bowel sounds are normal. There is no distension.      Palpations: Abdomen is soft. There is no mass.      Tenderness: There is no abdominal tenderness. There is no guarding or rebound.   Musculoskeletal:         General: No swelling or deformity. Normal range of motion.      Cervical back: Normal range of motion. No rigidity.      Right lower leg: No edema.      Left lower leg: No edema.   Lymphadenopathy:      Cervical: No cervical adenopathy.   Skin:     General: Skin is warm and dry.      Capillary Refill: Capillary refill takes less than 2 seconds.      Coloration: Skin is not jaundiced.      Findings: No bruising, erythema or rash.   Neurological:      General: No focal deficit present.      Mental Status: He is alert and oriented to person, place, and time.      Cranial Nerves: No cranial nerve deficit.      Motor: No weakness.      Coordination: Coordination normal.      Gait: Gait abnormal (Uses a walker).   Psychiatric:         Mood and Affect: Mood normal.         Behavior: Behavior normal.         Thought Content: Thought content normal.         Judgment: Judgment normal.                   Assessment and Plan   Diagnoses and all orders for this visit:    1. Type 2 diabetes mellitus with peripheral neuropathy (Primary)  -     gabapentin (NEURONTIN) 100 MG capsule; Take 1 capsule by mouth 2 (Two) Times a Day As Needed (neuropathy pain). Take 1 po bid for neuropathy pain, and take 1 - 2 po qhs along with the  300mg pills as directed  Indications: Diabetes with Nerve Disease  Dispense: 270 capsule; Refill: 1  -     gabapentin (NEURONTIN) 300 MG capsule; Take 1 capsule by mouth Every Night. Indications: Diabetes with Nerve Disease  Dispense: 90 capsule; Refill: 1  -     Ambulatory Referral to Endocrinology  Recent hemoglobin A1c was 5.8 then 2 weeks ago.  A1c could be a bit falsely lowered by his chronic anemia.  Nonetheless, I generally do not think the patient should be started on mealtime insulin based on that finding unless endocrinology recommends.  Because of his wife's ongoing concerned about the patient's postprandial glucose spikes, we will refer to endocrinology.  Once again recommended lowering the dose of basal insulin to try to keep his fasting glucose between 101 160.  2. Deficiency anemia  -     CBC & Differential  -     Folate  -     Ferritin  -     Iron  -     Vitamin B12  Patient did have a plethora of labs done by his cardiologist 2 weeks ago, but those labs did not include follow-up on his anemia, so we will check those studies today.  3. Chronic insomnia  -     eszopiclone (LUNESTA) 2 MG tablet; Take 1 tablet by mouth Every Night. Take immediately before bedtime  Dispense: 30 tablet; Refill: 5    4. Lower urinary tract symptoms due to benign prostatic hyperplasia    5. Chronic systolic heart failure  Comanaged with cardiology and currently well-controlled with Bumex 2 mg daily, carvedilol 6.25 mg twice a day, Farxiga 10 mg daily, and spironolactone 25 mg daily and he will continue.  Reportedly could not tolerate ACE inhibitor or ARB or Entresto due to hypotension, but I will need to review the cardiology clinic note about that  6. Encounter for long-term (current) use of high-risk medication    7. Proliferative diabetic retinopathy of both eyes associated with type 2 diabetes mellitus, unspecified proliferative retinopathy type  Patient has refused to go back to the eye doctor for any more evaluation  or treatment and is he has been advised that there is really not anything else they can do that will help, and he is resigned to the fact that his vision loss is irreversible  8. Gastroesophageal reflux disease without esophagitis    9. Essential hypertension    10. Diastolic dysfunction    11. Frequent falls  Still having these, in spite of using his walker most of the time, but fortunately denies any serious injuries  12. Cirrhosis of liver without ascites, unspecified hepatic cirrhosis type  Patient has been evaluated by gastroenterology and will follow-up as advised  13. Asymptomatic gallstones  Continues to be asymptomatic  14. Atherosclerosis of native coronary artery of native heart without angina pectoris  Managed by cardiology  15. Controlled type 2 diabetes mellitus with diabetic macular edema of both eyes resolved after treatment, with long-term current use of insulin  See above  16. Fatty liver disease, nonalcoholic    Other orders  -     atorvastatin (LIPITOR) 20 MG tablet; Take 1 tablet by mouth Daily.  Dispense: 90 tablet; Refill: 3  -     carvedilol (COREG) 6.25 MG tablet; Take 1 tablet by mouth 2 (Two) Times a Day With Meals. Indications: High Blood Pressure  Dispense: 180 tablet; Refill: 3  -     dapagliflozin Propanediol (Farxiga) 10 MG tablet; Take 10 mg by mouth Daily. Indications: Type 2 Diabetes  Dispense: 90 tablet; Refill: 1  -     finasteride (PROSCAR) 5 MG tablet; Take 1 tablet by mouth Daily.  Dispense: 90 tablet; Refill: 3  -     montelukast (SINGULAIR) 10 MG tablet; Take 1 tablet by mouth Every Evening.  Dispense: 90 tablet; Refill: 3  -     pantoprazole (PROTONIX) 40 MG EC tablet; Take 1 tablet by mouth Daily.  Dispense: 90 tablet; Refill: 3  -     venlafaxine XR (EFFEXOR-XR) 37.5 MG 24 hr capsule; Take 1 capsule by mouth Daily. Indications: Major Depressive Disorder  Dispense: 90 capsule; Refill: 3            Follow Up   Return in about 6 months (around 4/17/2025) for Annual physical,  Nurse - AWV Subsequent.  Patient was given instructions and counseling regarding his condition or for health maintenance advice. Please see specific information pulled into the AVS if appropriate.     Vimal Irene MD

## 2024-10-18 LAB
BASOPHILS # BLD AUTO: 0 X10E3/UL (ref 0–0.2)
BASOPHILS NFR BLD AUTO: 0 %
EOSINOPHIL # BLD AUTO: 0.2 X10E3/UL (ref 0–0.4)
EOSINOPHIL NFR BLD AUTO: 2 %
ERYTHROCYTE [DISTWIDTH] IN BLOOD BY AUTOMATED COUNT: 14.5 % (ref 11.6–15.4)
FERRITIN SERPL-MCNC: 24 NG/ML (ref 30–400)
FOLATE SERPL-MCNC: >20 NG/ML
HCT VFR BLD AUTO: 31.9 % (ref 37.5–51)
HGB BLD-MCNC: 10.5 G/DL (ref 13–17.7)
IMM GRANULOCYTES # BLD AUTO: 0 X10E3/UL (ref 0–0.1)
IMM GRANULOCYTES NFR BLD AUTO: 0 %
IRON SERPL-MCNC: 76 UG/DL (ref 38–169)
LYMPHOCYTES # BLD AUTO: 1.5 X10E3/UL (ref 0.7–3.1)
LYMPHOCYTES NFR BLD AUTO: 22 %
MCH RBC QN AUTO: 31.5 PG (ref 26.6–33)
MCHC RBC AUTO-ENTMCNC: 32.9 G/DL (ref 31.5–35.7)
MCV RBC AUTO: 96 FL (ref 79–97)
MONOCYTES # BLD AUTO: 0.5 X10E3/UL (ref 0.1–0.9)
MONOCYTES NFR BLD AUTO: 8 %
NEUTROPHILS # BLD AUTO: 4.7 X10E3/UL (ref 1.4–7)
NEUTROPHILS NFR BLD AUTO: 68 %
PLATELET # BLD AUTO: 163 X10E3/UL (ref 150–450)
RBC # BLD AUTO: 3.33 X10E6/UL (ref 4.14–5.8)
VIT B12 SERPL-MCNC: 395 PG/ML (ref 232–1245)
WBC # BLD AUTO: 6.9 X10E3/UL (ref 3.4–10.8)

## 2024-10-24 ENCOUNTER — TELEPHONE (OUTPATIENT)
Dept: FAMILY MEDICINE CLINIC | Facility: CLINIC | Age: 79
End: 2024-10-24
Payer: MEDICARE

## 2024-10-24 NOTE — TELEPHONE ENCOUNTER
Patients wife called and is requesting a phone call from Dr. Irene nurse. She states that Dr. Irene sent the patient to an endocrinologist, and its not until January, and she is concerned that it is with a PA-C and not an MD, and has questions for Lisa.

## 2024-10-24 NOTE — TELEPHONE ENCOUNTER
Patients wife called and is requesting a phone call from Dr. Irene nurse. She states that Dr. Irene sent the patient to an endocrinologist, and its not until January, and she is concerned that it is with a PA-C and not an MD, and has questions for Lisa.     Left message for wife Winter Fabian to return my call 10/24/2024 11:43am

## 2024-11-01 ENCOUNTER — TELEPHONE (OUTPATIENT)
Dept: FAMILY MEDICINE CLINIC | Facility: CLINIC | Age: 79
End: 2024-11-01
Payer: MEDICARE

## 2024-11-01 NOTE — TELEPHONE ENCOUNTER
Caller: KYREE RAMIREZ    Relationship: Emergency Contact    Best call back number: [phone]    What is the medical concern/diagnosis: CALLED TO CHECK STATUS FOR ENDOCRINOLOGY REFERRAL        What is the office location: IN [place]

## 2024-11-04 DIAGNOSIS — E11.42 TYPE 2 DIABETES MELLITUS WITH PERIPHERAL NEUROPATHY: ICD-10-CM

## 2024-11-05 RX ORDER — BLOOD-GLUCOSE SENSOR
EACH MISCELLANEOUS
Qty: 6 EACH | Refills: 3 | Status: SHIPPED | OUTPATIENT
Start: 2024-11-05

## 2024-11-06 ENCOUNTER — TELEPHONE (OUTPATIENT)
Dept: FAMILY MEDICINE CLINIC | Facility: CLINIC | Age: 79
End: 2024-11-06

## 2024-11-06 NOTE — TELEPHONE ENCOUNTER
CONTACTED PT AND PATIENT'S WIFE, SHE IS GOING TO CALL AND ASK FOR HIS APPT TO BE SWITCHED TO [facility] WHEN THEY ARE IN THE [place] OFFICE.

## 2024-11-06 NOTE — TELEPHONE ENCOUNTER
Caller: [facility] - [place], [place] - [address] [place] [address] - [phone]  - [phone] [facility]    Relationship: Pharmacy    Best call back number: [phone]    What was the call regarding: REPRESENTATIVE STATED THAT THERE ARE QUESTIONS THAT NEED TO BE ANSWERED REGARDING FARXIGA AND WILL CALL BACK.  PLEASE ADVISE    Additional Notes: FAX#-[phone]

## 2024-11-06 NOTE — TELEPHONE ENCOUNTER
Caller: [facility] - [place], [place] - [address] [place] [address] - [phone] [facility] [phone]      Relationship: Pharmacy     Best call back number: [phone]     What was the call regarding: REPRESENTATIVE STATED THAT THERE ARE QUESTIONS THAT NEED TO BE ANSWERED REGARDING FARXIGA AND WILL CALL BACK.  PLEASE ADVISE     Additional Notes: FAX#-[phone]       Called [facility]   [phone] to soon to fill on Farxiga will be in Dec 2024 before next fill avaliable

## 2025-01-16 RX ORDER — DAPAGLIFLOZIN 10 MG/1
1 TABLET, FILM COATED ORAL DAILY
Qty: 90 TABLET | Refills: 2 | OUTPATIENT
Start: 2025-01-16

## 2025-01-25 ENCOUNTER — OFFICE VISIT (OUTPATIENT)
Dept: FAMILY MEDICINE CLINIC | Facility: CLINIC | Age: 80
End: 2025-01-25
Payer: MEDICARE

## 2025-01-25 VITALS
HEIGHT: 70 IN | HEART RATE: 63 BPM | SYSTOLIC BLOOD PRESSURE: 122 MMHG | BODY MASS INDEX: 34.07 KG/M2 | OXYGEN SATURATION: 98 % | WEIGHT: 238 LBS | DIASTOLIC BLOOD PRESSURE: 70 MMHG | TEMPERATURE: 98.6 F

## 2025-01-25 DIAGNOSIS — R05.1 ACUTE COUGH: ICD-10-CM

## 2025-01-25 DIAGNOSIS — J40 BRONCHITIS: Primary | ICD-10-CM

## 2025-01-25 LAB
EXPIRATION DATE: NORMAL
FLUAV AG UPPER RESP QL IA.RAPID: NOT DETECTED
FLUBV AG UPPER RESP QL IA.RAPID: NOT DETECTED
INTERNAL CONTROL: NORMAL
Lab: NORMAL
SARS-COV-2 AG UPPER RESP QL IA.RAPID: NOT DETECTED

## 2025-01-25 PROCEDURE — 1125F AMNT PAIN NOTED PAIN PRSNT: CPT | Performed by: PHYSICIAN ASSISTANT

## 2025-01-25 PROCEDURE — 99213 OFFICE O/P EST LOW 20 MIN: CPT | Performed by: PHYSICIAN ASSISTANT

## 2025-01-25 PROCEDURE — 3074F SYST BP LT 130 MM HG: CPT | Performed by: PHYSICIAN ASSISTANT

## 2025-01-25 PROCEDURE — 3078F DIAST BP <80 MM HG: CPT | Performed by: PHYSICIAN ASSISTANT

## 2025-01-25 PROCEDURE — 87428 SARSCOV & INF VIR A&B AG IA: CPT | Performed by: PHYSICIAN ASSISTANT

## 2025-01-25 RX ORDER — DOXYCYCLINE 100 MG/1
100 CAPSULE ORAL 2 TIMES DAILY
Qty: 14 CAPSULE | Refills: 0 | Status: SHIPPED | OUTPATIENT
Start: 2025-01-25

## 2025-01-25 NOTE — PROGRESS NOTES
"Chief Complaint  Cough    Subjective          Joe Fung presents to [facility]  History of Present Illness  Patient in today for evaluation on cough, congestion and some shortness of breath symptoms past 3-4 days. Denies fever. States was having issues with constipation but yesterday had two large bowel movements and feels that has helped with his breathing as well. He states vomited one time several days ago. Wife states oxygen levels ranging from 80s-mid 90s. States yesterday had called EMS to come check him and states they did EKG and told to f/up if symptoms progressed. He denies chest pain.  if takes deep breaths has some discomfort into bilateral upper back/shoulder areas.   Cough  This is a new problem. The current episode started in the past 7 days. Associated symptoms include chills, nasal congestion and shortness of breath. Pertinent negatives include no chest pain, fever, sore throat or wheezing.       Objective   Vital Signs:   /70   Pulse 63   Temp 98.6 °F (37 °C) (Oral)   Ht 177.8 cm (70\")   Wt 108 kg (238 lb)   SpO2 98%   BMI 34.15 kg/m²     Body mass index is 34.15 kg/m².    Review of Systems   Constitutional:  Positive for chills. Negative for fever.   HENT:  Positive for congestion. Negative for sore throat.    Respiratory:  Positive for cough and shortness of breath. Negative for wheezing.    Cardiovascular:  Negative for chest pain and palpitations.   Gastrointestinal:  Negative for abdominal pain, diarrhea, nausea and vomiting.   Genitourinary:  Negative for frequency.   Neurological:  Negative for dizziness and headache.       Past History:  Medical History: has a past medical history of Adenomatous polyp of colon, Bilateral exudative age-related macular degeneration, BMI 33.0-33.9,adult, BPH associated with nocturia, Bursitis of left elbow, Chronic fatigue, Chronic systolic heart failure, CKD (chronic kidney disease), Diabetic retinopathy, " Diastolic dysfunction, Dilated cardiomyopathy, Elevated lipids, Frequent falls, Insomnia, unspecified, Long term (current) use of insulin, Mild episode of recurrent major depressive disorder, Mixed hyperlipidemia, Nocturia, Other long term (current) drug therapy, Perennial allergic rhinitis, Peripheral polyneuropathy, Primary osteoarthritis involving multiple joints, Rotator cuff tear, Skin cancer, Small vessel coronary artery disease, Type 2 diabetes mellitus with diabetic neuropathy, with long-term current use of insulin, and Vitamin D insufficiency.   Surgical History: has a past surgical history that includes Knee arthroscopy (Bilateral); Rotator cuff repair; Elbow bursa surgery; and Cataract extraction.   Family History: family history includes Cancer in his mother; Diabetes in his maternal grandmother and mother; Heart disease in his maternal grandmother; Stroke in his mother.   Social History: reports that he has never smoked. He has never used smokeless tobacco. He reports current alcohol use. He reports that he does not use drugs.      Current Outpatient Medications:     Alpha-Lipoic Acid 600 MG tablet, Take  by mouth. Indications: Muscle Cramps, Disp: , Rfl:     aspirin 81 MG chewable tablet, Chew 1 tablet Daily., Disp: , Rfl:     atorvastatin (LIPITOR) 20 MG tablet, Take 1 tablet by mouth Daily., Disp: 90 tablet, Rfl: 3    bumetanide (BUMEX) 2 MG tablet, Take 1 tablet by mouth Daily. Prn  Indications: High Blood Pressure, Disp: , Rfl:     carvedilol (COREG) 6.25 MG tablet, Take 1 tablet by mouth 2 (Two) Times a Day With Meals. Indications: High Blood Pressure, Disp: 180 tablet, Rfl: 3    cetirizine (zyrTEC) 10 MG tablet, Take 1 tablet by mouth Daily. Indications: Hayfever, Disp: , Rfl:     Cinnamon 500 MG capsule, Take  by mouth., Disp: , Rfl:     clopidogrel (PLAVIX) 75 MG tablet, Take 1 tablet by mouth Daily., Disp: , Rfl:     coenzyme Q10 100 MG capsule, Take  by mouth., Disp: , Rfl:     Continuous  Glucose Sensor (FreeStyle Diana 3 Sensor) misc, USE AS DIRECTED CHANGE EVERY 14 DAYS, Disp: 6 each, Rfl: 3    dapagliflozin Propanediol (Farxiga) 10 MG tablet, Take 10 mg by mouth Daily. Indications: Type 2 Diabetes, Disp: 90 tablet, Rfl: 1    eszopiclone (LUNESTA) 2 MG tablet, Take 1 tablet by mouth Every Night. Take immediately before bedtime, Disp: 30 tablet, Rfl: 5    finasteride (PROSCAR) 5 MG tablet, Take 1 tablet by mouth Daily., Disp: 90 tablet, Rfl: 3    gabapentin (NEURONTIN) 100 MG capsule, Take 1 capsule by mouth 2 (Two) Times a Day As Needed (neuropathy pain). Take 1 po bid for neuropathy pain, and take 1 - 2 po qhs along with the 300mg pills as directed  Indications: Diabetes with Nerve Disease, Disp: 270 capsule, Rfl: 1    gabapentin (NEURONTIN) 300 MG capsule, Take 1 capsule by mouth Every Night. Indications: Diabetes with Nerve Disease, Disp: 90 capsule, Rfl: 1    Insulin Glargine (Lantus SoloStar) 100 UNIT/ML injection pen, Inject 30-40 units SC bid AS DIRECTED (dose varies based on glucose readings), Disp: 90 mL, Rfl: 3    Insulin Pen Needle (Droplet Pen Needles) 31G X 8 MM misc, Inject 1 each under the skin into the appropriate area as directed 2 (Two) Times a Day., Disp: 200 each, Rfl: 3    lidocaine (LIDODERM) 5 %, Place 1 patch on the skin as directed by provider Daily. Remove & Discard patch within 12 hours or as directed by MD. Must leave off for 12 hours before applying new patch, Disp: 30 patch, Rfl: 11    Mirabegron ER (MYRBETRIQ) 50 MG tablet sustained-release 24 hour 24 hr tablet, Myrbetriq 50 mg tablet,extended release  Take 1 tablet every day by oral route in the morning., Disp: , Rfl:     montelukast (SINGULAIR) 10 MG tablet, Take 1 tablet by mouth Every Evening., Disp: 90 tablet, Rfl: 3    multivitamin with minerals tablet tablet, Take 1 tablet by mouth Daily. WITH FOOD, Disp: , Rfl:     pantoprazole (PROTONIX) 40 MG EC tablet, Take 1 tablet by mouth Daily., Disp: 90 tablet, Rfl:  3    spironolactone (ALDACTONE) 25 MG tablet, Take 1 tablet by mouth Daily., Disp: 90 tablet, Rfl: 1    traMADol (ULTRAM) 50 MG tablet, Take 1 tablet by mouth Every 6 (Six) Hours As Needed for Moderate Pain., Disp: , Rfl:     venlafaxine XR (EFFEXOR-XR) 37.5 MG 24 hr capsule, Take 1 capsule by mouth Daily. Indications: Major Depressive Disorder, Disp: 90 capsule, Rfl: 3    doxycycline (VIBRAMYCIN) 100 MG capsule, Take 1 capsule by mouth 2 (Two) Times a Day., Disp: 14 capsule, Rfl: 0  Allergies: Lortab [hydrocodone-acetaminophen]    Physical Exam  Constitutional:       Appearance: Normal appearance.   HENT:      Right Ear: Tympanic membrane normal.      Left Ear: Tympanic membrane normal.      Mouth/Throat:      Mouth: Mucous membranes are moist.   Eyes:      Pupils: Pupils are equal, round, and reactive to light.   Cardiovascular:      Rate and Rhythm: Normal rate and regular rhythm.      Heart sounds: Normal heart sounds.   Pulmonary:      Breath sounds: Normal breath sounds.   Abdominal:      Palpations: Abdomen is soft.      Tenderness: There is no abdominal tenderness.   Neurological:      Mental Status: He is alert.   Psychiatric:         Mood and Affect: Mood normal.         Behavior: Behavior normal.             Assessment and Plan   Diagnoses and all orders for this visit:    1. Bronchitis (Primary)  Rapid covid and flu testing negative; he currently denies shortness of breath- discussed if having pain with breathing and fluctuating oxygen levels would recommend he go to ER today for evaluation and imaging- patient refuses to go to ER-- discussed can start on antibiotic- recommend to monitor symptoms closely and if not improving he needs to go to ER for further evaluation - he voices understanding and states if not improving on antibiotic, he will go to get checked out   2. Acute cough  -     POCT SARS-CoV-2 + Flu Antigen SERGIO    Other orders  -     doxycycline (VIBRAMYCIN) 100 MG capsule; Take 1 capsule by  mouth 2 (Two) Times a Day.  Dispense: 14 capsule; Refill: 0            Follow Up   No follow-ups on file.  Patient was given instructions and counseling regarding his condition or for health maintenance advice. Please see specific information pulled into the AVS if appropriate.     Esperanza Haas PA-C

## 2025-01-27 ENCOUNTER — TELEPHONE (OUTPATIENT)
Dept: FAMILY MEDICINE CLINIC | Facility: CLINIC | Age: 80
End: 2025-01-27
Payer: MEDICARE

## 2025-01-27 NOTE — TELEPHONE ENCOUNTER
Called spoke with pt wife . Pt was seen Saturday was given abx and was advised if not any better then needed to go to ER for evaluation. Pt wife declined ER visit aware she can bring pt into office for appt but recommendation was to take pt to ER they refused to take pt. Pt wife said she might call back to cancel appointment but voiced she was not taking pt to ER.

## 2025-01-28 ENCOUNTER — TELEPHONE (OUTPATIENT)
Dept: ONCOLOGY | Facility: CLINIC | Age: 80
End: 2025-01-28
Payer: MEDICARE

## 2025-01-28 NOTE — TELEPHONE ENCOUNTER
Caller: KYREE RAMIREZ    Relationship: Emergency Contact    Best call back number: [phone]    What is the best time to reach you: ANY    Who are you requesting to speak with (clinical staff, provider,  specific staff member): CLINICAL     What was the call regarding: KYREE STATES THAT DAVIE WENT TO [facility] ER YESTERDAY  HIS HEMOGLOBIN  WAS 9.4  HEMATOCRIT WAS 30.6  RBC 2.89    SHE STATES THAT HE IS SOB, FATIGUE AND WANTS TO SLEEP ALL THE TIME    SHE IS WANTING HIM TO COME BACK AND SEE DR FLETCHER      PLEASE ADVISE

## 2025-01-29 ENCOUNTER — TELEPHONE (OUTPATIENT)
Dept: ONCOLOGY | Facility: CLINIC | Age: 80
End: 2025-01-29
Payer: MEDICARE

## 2025-01-29 DIAGNOSIS — K76.0 FATTY LIVER DISEASE, NONALCOHOLIC: Primary | ICD-10-CM

## 2025-01-29 DIAGNOSIS — R74.8 ELEVATED LIVER ENZYMES: ICD-10-CM

## 2025-01-29 DIAGNOSIS — D53.9 MACROCYTIC ANEMIA: ICD-10-CM

## 2025-01-29 DIAGNOSIS — E80.6 HYPERBILIRUBINEMIA: ICD-10-CM

## 2025-01-29 DIAGNOSIS — K74.60 CIRRHOSIS OF LIVER WITHOUT ASCITES, UNSPECIFIED HEPATIC CIRRHOSIS TYPE: ICD-10-CM

## 2025-01-29 NOTE — TELEPHONE ENCOUNTER
I called [facility] to discuss getting Joe in for an appointment.  I did review notes from ER visit 1/27/2025.  She states that he has been doing Lasix 40 mg for the last 2 days and is feeling a little bit better with his shortness of breath.  I discussed with her that after reviewing his records I was concerned with his bilirubin of 2.8 and AST 64, his ALT was 43, these are all higher than they were several months ago.  He has not had a follow-up since June with GI.  I asked her to reach out to them to get an appointment.  In the meantime I will order a liver spleen ultrasound.  We will get him worked in for further evaluation.  She states understanding.  I also discussed with her that if he developed any jaundice or other concerns he should go to the ER.

## 2025-02-06 ENCOUNTER — OFFICE VISIT (OUTPATIENT)
Dept: ONCOLOGY | Facility: CLINIC | Age: 80
End: 2025-02-06
Payer: MEDICARE

## 2025-02-06 VITALS
BODY MASS INDEX: 31.78 KG/M2 | DIASTOLIC BLOOD PRESSURE: 43 MMHG | TEMPERATURE: 97.5 F | SYSTOLIC BLOOD PRESSURE: 100 MMHG | WEIGHT: 222 LBS | RESPIRATION RATE: 18 BRPM | HEART RATE: 68 BPM | OXYGEN SATURATION: 96 % | HEIGHT: 70 IN

## 2025-02-06 DIAGNOSIS — D53.9 MACROCYTIC ANEMIA: Primary | ICD-10-CM

## 2025-02-06 DIAGNOSIS — I51.9 HEART DISEASE, UNSPECIFIED: ICD-10-CM

## 2025-02-06 DIAGNOSIS — K74.60 CIRRHOSIS OF LIVER WITHOUT ASCITES, UNSPECIFIED HEPATIC CIRRHOSIS TYPE: ICD-10-CM

## 2025-02-06 NOTE — PROGRESS NOTES
"     CHIEF COMPLAINT: Fatigue and shortness of breath    Problem List:  Oncology/Hematology History Overview Note   1.  Anemia  2.  History of adenoma  3.  Diastolic dysfunction   4.  Diabetes  5.  Hyperlipidemia  6.  Polyneuropathy  7.  Macular degeneration  8.  Chronic kidney disease  9.  Diarrhea felt to be due to Bumex  10.  Osteoarthritis  11.  Cirrhosis identified on gallbladder ultrasound April 2024  -Seen by Marlene JEAN APRN June 2024  -6/13/2024 CT abdomen and pelvis shows hepatic steatosis, cholelithiasis without CT evidence of cholecystitis.  No acute abdominopelvic process.  -6/26/2024 labs from GI mitochondria M2 Ab negative <20, actin smooth muscle IgG  Ab 3, negative.  Total bilirubin 1.6, AST 17, ALT 29.  Hepatitis panel negative  -9/30/2024 CMP total bilirubin 1.7, AST 13, ALT 13  -1/27/2025 ER visit [facility] for shortness of air, CBC WBC 7.0, hemoglobin 9.4, hematocrit 30.6%, .7, platelet count 179,000.  CMP creatinine 1.10, glucose 136, calcium 8.3, AST 64, ALT 43, total bilirubin 2.8.    Hematology history timeline:  -2019 colonoscopy with polyps.  Next 1 due 2024.  -12/22/2020 iron 91.  Ferritin 63.6  -November 2021 hemoglobin 12.4 with normal ferritin, iron, B12, folate Per primary care note.  -2/10/2022 iron 82 ferritin 67.5.  Folic acid greater than 22.5.  Hemoglobin 11.9 with MCV 97 and normal white count platelet count and differential.  - 3/16/2022 EGD duodenal biopsy x2, stomach antrum biopsy x2 unremarkable.  Descending colon polyp on colonoscopy as well as sigmoid polyp both tubular adenomas without dysplasia    -5/31/2022 [facility] initial consultation: Here because of his \"anemia\".  He is barely anemic with normochromic normocytic indices which would make iron deficiency or folate deficiency or B12 deficiency unlikely and none of the serologies suggest this.  His wife and he are both convinced he has had an EGD and a colonoscopy done within the last year " with Dr. Jerez and I will try to get those results but in the meantime before putting him through an M panel, hemolytic work-up, megaloblastic work-up, and certainly before do anything more aggressive like a bone marrow biopsy to look for myelodysplasia, I will simply repeat his blood work now and again in 4 to 5 months to see what the trend is.  If it is normalizing or stable he is not sure how much work-up he did want to do.  The odds of myelodysplasia or some more sinister marrow disorder with normochromic normocytic indices is unlikely.  With a combination of mild chronic kidney disease and polyneuropathy, if his anemia worsens we will also check the M panel for possible monoclonal gammopathy as a hint towards plasma cell dyscrasia.    -5/31/2022 hemoglobin 11.5 With MCV 96 otherwise unremarkable CBC and differential.  -6/23/2022 hemoglobin 13 MCV 97.  Ferritin 87 with iron 73, B12 greater than 2000, and folate greater than 20  - 10/6/2022 hemoglobin 12.8 with MCV 99.2 otherwise unremarkable CBC    -10/11/2022 [facility] hematology follow-up: Over the last 6 months, he essentially has not been anemic.  He is mildly macrocytic but without B12 or folate deficiency.  No further hematologic evaluation at this junction unless he develops significant anemia I.  E.  Refer him back if he needs less than a hemoglobin of 10 but otherwise no further hematology work-up.  I will see as needed.     -1/27/2025 ER visit with c/o SOA and weakness.  Hgb 9.4, Hct 30.6, .7, Plt 179,000.  Total Bili 2.8, AST 64, ALT 43, Alk Phos 77. NT-Pro-BNP 3057.  Chest x-ray shows similar cardiomegaly without appreciable edema, no other acute findings.    -2/4/2025 abdominal ultrasound shows increased echogenicity of the liver suggestive of steatosis.  No suspicious focal liver lesions detected.  Portal vein shows antegrade flow within the main portal vein.  Gallstones noted within the otherwise unremarkable gallbladder.  Common duct 3  mm.  Visible portions of the pancreas appear unremarkable.  Spleen is normal size measuring 12.5 cm.  No ascites.     Macrocytic anemia       HISTORY OF PRESENT ILLNESS:  The patient is a 80 y.o. male, here for follow up on management of anemia.  We last saw Mr. Fung October 2022 when at that time he was seen in our office for normocytic anemia that had resolved.  Since that time he was diagnosed on follow-up with his PCP with cirrhosis around April 2024 after having had an ultrasound to evaluate his gallbladder.  He was referred to GI and saw Marlene Zamorano June 6, 2024 with recommendations to follow-up in 6 months however Mr. Fung did not go to follow-up.  More recently he went to the ER in [place] on 1/27/2025 with complaints of shortness of air and weakness.  He has had the symptoms for several weeks and currently is feeling no better.  Workup in the ER with no acute findings however they did point out concern over his hemoglobin of 9.4, hematocrit 30.6%, his MCV was 102.7 and they recommended he follow-up with hematology.  Of note also during that visit his proBNP was 3057, his total bilirubin was 2.8, his AST was 64, ALT of 43 and alkaline phosphatase normal at 77.  Mr. Fung states that he feels fatigued most of the time, he does have shortness of breath that is worse when he is lying flat therefore he typically sleeps sitting up.  His wife has increased his diuretics which seems to have helped with his swelling.  He reports his appetite has decreased although his weight seems to be stable.  No fevers or chills.  He has not noted any blood in his stool or dark tarry stools.  He has not noted any tea colored urine, no jaundice.    Past Medical History:   Diagnosis Date    Adenomatous polyp of colon     Bilateral exudative age-related macular degeneration     BMI 33.0-33.9,adult     BPH associated with nocturia     Bursitis of left elbow     Chronic fatigue     Chronic systolic heart failure     CKD  "(chronic kidney disease)     Diabetic retinopathy     Diastolic dysfunction     Dilated cardiomyopathy     Elevated lipids     Frequent falls     Insomnia, unspecified     Long term (current) use of insulin     Mild episode of recurrent major depressive disorder     Mixed hyperlipidemia     Nocturia     Other long term (current) drug therapy     Perennial allergic rhinitis     Peripheral polyneuropathy     Primary osteoarthritis involving multiple joints     Rotator cuff tear     Skin cancer     Small vessel coronary artery disease     Type 2 diabetes mellitus with diabetic neuropathy, with long-term current use of insulin     Vitamin D insufficiency      Past Surgical History:   Procedure Laterality Date    CATARACT EXTRACTION      ELBOW OLECRANNON BURSA EXCISION      KNEE ARTHROSCOPY Bilateral     ROTATOR CUFF REPAIR         Allergies   Allergen Reactions    Lortab [Hydrocodone-Acetaminophen] Nausea Only       Family History and Social History reviewed and changed as necessary    REVIEW OF SYSTEM:   Fatigue and shortness of breath, orthopnea    PHYSICAL EXAM:  Well-developed, well-nourished male in no distress, here with his wife today  No jaundice or icterus, he does have mild pallor  Respirations regular and unlabored, oxygen saturation 96% on room air    Vitals:    02/06/25 0854   BP: 100/43   Pulse: 68   Resp: 18   Temp: 97.5 °F (36.4 °C)   SpO2: 96%   Weight: 101 kg (222 lb)   Height: 177.8 cm (70\")     Vitals:    02/06/25 0854   PainSc:   6   PainLoc: Back          ECOG score: 2           Vitals reviewed.  Reviewed records from ER visit 1/27/2025, also reviewed notes available in epic regarding cirrhosis diagnosed on gallbladder ultrasound ordered by PCP April 2024 and referral to GI, note from office visit with SATNAM Ceballos in June 2024 and lab workup from that visit.    ECOG: (2) Ambulatory & Capable of Self Care, Unable to Carry Out Work Activity, Up & About Greater Than 50% of Waking Hours    Lab " Results   Component Value Date    HGB 10.5 (L) 10/17/2024    HCT 31.9 (L) 10/17/2024    MCV 96 10/17/2024     10/17/2024    WBC 6.9 10/17/2024    NEUTROABS 4.7 10/17/2024    LYMPHSABS 1.5 10/17/2024    MONOSABS 0.5 10/17/2024    EOSABS 0.2 10/17/2024    BASOSABS 0.0 10/17/2024       Lab Results   Component Value Date    GLUCOSE 161 (H) 05/06/2024    BUN 29 (H) 05/06/2024    CREATININE 1.01 05/06/2024     05/06/2024    K 4.4 05/06/2024     05/06/2024    CO2 27 05/06/2024    CALCIUM 9.3 05/06/2024    ALBUMIN 4.4 05/06/2024    BILITOT 1.8 (H) 05/06/2024    ALKPHOS 97 05/06/2024    AST 22 05/06/2024    ALT 14 05/06/2024             ASSESSMENT & PLAN:    1.  Anemia  2.  History of adenoma  3.  Diastolic dysfunction   4.  Diabetes  5.  Hyperlipidemia  6.  Polyneuropathy  7.  Macular degeneration  8.  Chronic kidney disease  9.  Diarrhea felt to be due to Bumex  10.  Osteoarthritis  11.  Cirrhosis identified on gallbladder ultrasound April 2024  -Seen by MIRANDA, Marlene Zamorano, SATNAM June 2024  -6/13/2024 CT abdomen and pelvis shows hepatic steatosis, cholelithiasis without CT evidence of cholecystitis.  No acute abdominopelvic process.  -6/26/2024 labs from GI mitochondria M2 Ab negative <20, actin smooth muscle IgG  Ab 3, negative.  Total bilirubin 1.6, AST 17, ALT 29.  Hepatitis panel negative  -9/30/2024 CMP total bilirubin 1.7, AST 13, ALT 13  -1/27/2025 ER visit [facility] for shortness of air, CBC WBC 7.0, hemoglobin 9.4, hematocrit 30.6%, .7, platelet count 179,000.  CMP creatinine 1.10, glucose 136, calcium 8.3, AST 64, ALT 43, total bilirubin 2.8.    Hematology history timeline:  -2019 colonoscopy with polyps.  Next 1 due 2024.  -12/22/2020 iron 91.  Ferritin 63.6  -November 2021 hemoglobin 12.4 with normal ferritin, iron, B12, folate Per primary care note.  -2/10/2022 iron 82 ferritin 67.5.  Folic acid greater than 22.5.  Hemoglobin 11.9 with MCV 97 and normal white count platelet count  "and differential.  - 3/16/2022 EGD duodenal biopsy x2, stomach antrum biopsy x2 unremarkable.  Descending colon polyp on colonoscopy as well as sigmoid polyp both tubular adenomas without dysplasia    -5/31/2022 [facility] initial consultation: Here because of his \"anemia\".  He is barely anemic with normochromic normocytic indices which would make iron deficiency or folate deficiency or B12 deficiency unlikely and none of the serologies suggest this.  His wife and he are both convinced he has had an EGD and a colonoscopy done within the last year with Dr. Jerez and I will try to get those results but in the meantime before putting him through an M panel, hemolytic work-up, megaloblastic work-up, and certainly before do anything more aggressive like a bone marrow biopsy to look for myelodysplasia, I will simply repeat his blood work now and again in 4 to 5 months to see what the trend is.  If it is normalizing or stable he is not sure how much work-up he did want to do.  The odds of myelodysplasia or some more sinister marrow disorder with normochromic normocytic indices is unlikely.  With a combination of mild chronic kidney disease and polyneuropathy, if his anemia worsens we will also check the M panel for possible monoclonal gammopathy as a hint towards plasma cell dyscrasia.    -5/31/2022 hemoglobin 11.5 With MCV 96 otherwise unremarkable CBC and differential.  -6/23/2022 hemoglobin 13 MCV 97.  Ferritin 87 with iron 73, B12 greater than 2000, and folate greater than 20  - 10/6/2022 hemoglobin 12.8 with MCV 99.2 otherwise unremarkable CBC    -10/11/2022 [facility] follow-up: Over the last 6 months, he essentially has not been anemic.  He is mildly macrocytic but without B12 or folate deficiency.  No further hematologic evaluation at this junction unless he develops significant anemia I.  E.  Refer him back if he needs less than a hemoglobin of 10 but otherwise no further hematology work-up.  I " will see as needed.     -1/27/2025 ER visit with c/o SOA and weakness.  Hgb 9.4, Hct 30.6, .7, Plt 179,000.  Total Bili 2.8, AST 64, ALT 43, Alk Phos 77. NT-Pro-BNP 3057.  Chest x-ray shows similar cardiomegaly without appreciable edema, no other acute findings.    -2/4/2025 abdominal ultrasound shows increased echogenicity of the liver suggestive of steatosis.  No suspicious focal liver lesions detected.  Portal vein shows antegrade flow within the main portal vein.  Gallstones noted within the otherwise unremarkable gallbladder.  Common duct 3 mm.  Visible portions of the pancreas appear unremarkable.  Spleen is normal size measuring 12.5 cm.  No ascites.    -2/6/2025 [facility] hematology clinic follow-up: Since we released Mr. Fung to follow-up in October 2022 at that time his anemia had resolved, more currently he is now presenting with macrocytic anemia in the face of newly diagnosed cirrhosis.  Abdominal ultrasound showed some portal vein anterograde flow and echogenicity of the liver suggestive of steatosis.  He has not had follow-up as recommended with GI, his wife is going to reach out to get an appointment.  His bilirubin has worsened over the last several months, he did not appear overtly jaundiced today.  For further evaluation of his anemia we will get a peripheral smear, check for hemolysis with haptoglobin and LDH, I will also check a megaloblastic panel, I will repeat his CMP, Iron studies, direct Coral, reticulocyte count, erythropoietin level and total and indirect bilirubin, also for reversible causes checking his B12, methylmalonic acid and folate.  Most likely his anemia is due to his cirrhosis and portal hypertension.  He follows with Dr. Corey cardiology, he has not had a follow-up since his ER visit, his BNP was elevated at over 3000.  I have encouraged his wife to get him in sooner than his scheduled follow-up in April.  We will see him back in 3-4 weeks for follow-up to go over  these results.    I spent 47 minutes caring for Joe on this date of service. This time includes time spent by me in the following activities: preparing for the visit, reviewing tests, obtaining and/or reviewing a separately obtained history, performing a medically appropriate examination and/or evaluation, counseling and educating the patient/family/caregiver, ordering medications, tests, or procedures, referring and communicating with other health care professionals, documenting information in the medical record, independently interpreting results and communicating that information with the patient/family/caregiver, care coordination, and discussed with Dr. Das also at time of visit .     Chuyita De Luna, APRN    02/06/2025

## 2025-02-17 RX ORDER — DAPAGLIFLOZIN 10 MG/1
10 TABLET, FILM COATED ORAL DAILY
Qty: 90 TABLET | Refills: 1 | OUTPATIENT
Start: 2025-02-17

## 2025-02-17 NOTE — TELEPHONE ENCOUNTER
Caller: KYREE RAMIREZ    Relationship: Emergency Contact    Best call back number: [phone]     Requested Prescriptions:   Requested Prescriptions     Pending Prescriptions Disp Refills    dapagliflozin Propanediol (Farxiga) 10 MG tablet 90 tablet 1     Sig: Take 10 mg by mouth Daily. Indications: Type 2 Diabetes        Pharmacy where request should be sent: [address] [phone] [facility] [phone]      Last office visit with prescribing clinician: 10/17/2024   Last telemedicine visit with prescribing clinician: Visit date not found   Next office visit with prescribing clinician: Visit date not found     Additional details provided by patient:     Does the patient have less than a 3 day supply:  [] Yes  [x] No    Would you like a call back once the refill request has been completed: [] Yes [] No    If the office needs to give you a call back, can they leave a voicemail: [] Yes [] No    Vielka Glover Rep   02/17/25 11:41 EST

## 2025-02-18 ENCOUNTER — TELEPHONE (OUTPATIENT)
Dept: FAMILY MEDICINE CLINIC | Facility: CLINIC | Age: 80
End: 2025-02-18

## 2025-02-18 NOTE — TELEPHONE ENCOUNTER
Pt was advised at the ER to stop taking the Bumex and take the Torsemide. Pt has not been taking the Bumex since he was advised not to. Pt feels the Torsemide is helping him better than the bumex and he would like to switch.

## 2025-02-18 NOTE — TELEPHONE ENCOUNTER
Caller: KYREE RAMIREZ    Relationship: Emergency Contact    Best call back number: [phone]     What medication are you requesting: TORSEMIDE 10MG, UP TO 40MG PER DAY, DEPENDING ON HOW BAD THE SWELLING IS    If a prescription is needed, what is your preferred pharmacy and phone number: [facility] [id number] [facility] [address] [facility] [address] - [phone]  - [phone] FX     Additional notes: PATIENT GOT THIS MED FROM THE EMERGENCY ROOM, AND SAYS IT IS WORKING WELL AND IS REQUESTING DR RIOS SEND IN A REFILL

## 2025-02-24 RX ORDER — LIDOCAINE 50 MG/G
1 PATCH TOPICAL SEE ADMIN INSTRUCTIONS
Qty: 30 PATCH | Refills: 11 | OUTPATIENT
Start: 2025-02-24

## 2025-02-26 ENCOUNTER — OFFICE VISIT (OUTPATIENT)
Dept: ENDOCRINOLOGY | Facility: CLINIC | Age: 80
End: 2025-02-26
Payer: MEDICARE

## 2025-02-26 VITALS
WEIGHT: 222 LBS | DIASTOLIC BLOOD PRESSURE: 48 MMHG | BODY MASS INDEX: 31.78 KG/M2 | SYSTOLIC BLOOD PRESSURE: 104 MMHG | OXYGEN SATURATION: 98 % | HEART RATE: 73 BPM | HEIGHT: 70 IN

## 2025-02-26 DIAGNOSIS — E11.649 TYPE 2 DIABETES MELLITUS WITH HYPOGLYCEMIA WITHOUT COMA, WITH LONG-TERM CURRENT USE OF INSULIN: ICD-10-CM

## 2025-02-26 DIAGNOSIS — Z79.4 TYPE 2 DIABETES MELLITUS WITH HYPOGLYCEMIA WITHOUT COMA, WITH LONG-TERM CURRENT USE OF INSULIN: ICD-10-CM

## 2025-02-26 DIAGNOSIS — L97.528 DIABETIC ULCER OF TOE OF LEFT FOOT ASSOCIATED WITH TYPE 2 DIABETES MELLITUS, WITH OTHER ULCER SEVERITY: Primary | ICD-10-CM

## 2025-02-26 DIAGNOSIS — E11.621 DIABETIC ULCER OF TOE OF LEFT FOOT ASSOCIATED WITH TYPE 2 DIABETES MELLITUS, WITH OTHER ULCER SEVERITY: Primary | ICD-10-CM

## 2025-02-26 DIAGNOSIS — R09.89 DIMINISHED PULSES IN LOWER EXTREMITY: ICD-10-CM

## 2025-02-26 PROBLEM — E11.21 TYPE 2 DIABETES MELLITUS WITH DIABETIC NEPHROPATHY, WITH LONG-TERM CURRENT USE OF INSULIN: Status: ACTIVE | Noted: 2025-02-26

## 2025-02-26 PROBLEM — L97.509 DIABETIC FOOT ULCER: Status: ACTIVE | Noted: 2025-02-26

## 2025-02-26 PROBLEM — L97.511 DIABETIC ULCER OF TOE OF RIGHT FOOT ASSOCIATED WITH TYPE 2 DIABETES MELLITUS, LIMITED TO BREAKDOWN OF SKIN: Status: RESOLVED | Noted: 2022-06-23 | Resolved: 2025-02-26

## 2025-02-26 LAB
EXPIRATION DATE: NORMAL
HBA1C MFR BLD: 5.7 % (ref 4.5–5.7)
Lab: NORMAL

## 2025-02-26 RX ORDER — INSULIN LISPRO 100 [IU]/ML
4 INJECTION, SOLUTION INTRAVENOUS; SUBCUTANEOUS
Qty: 27 ML | Refills: 0 | Status: SHIPPED | OUTPATIENT
Start: 2025-02-26 | End: 2025-03-03

## 2025-02-26 NOTE — PROGRESS NOTES
Chief Complaint   Patient presents with    Diabetes        HPI  Joe Fung is a 80 y.o. male presents today for evaluation of type 2 diabetes. Referring Provider: Vimal Irene MD.     Accompanied by wife at visit today who assists with care at home.     Reports sore to left great toe for few weeks. Denies significant changes over time. Has had hx of ulcerations and previously following with wound clinic at [facility]. Unknown BEN in past. Has had venous ultrasounds.    Reports taking lantus 30-40 units BID. Takes 40 units with higher BG readings. Has been using mostly 40 units BID lately. Reports frequent hypoglycemia in early mornings and high blood sugars later in day. Farxiga 10 mg started for CHF.     - Vision loss with injections in past   - Chronic numbness/sensation loss. Admits sore to toe; healing.   - Admits constipation, BM 2x/week, occasional loose stools  - Admits intermittent heartburn taking PPI  - Denies abdominal pain.  - Denies increased thirst or urination.   - Denies burning with urination.  - Hx leakage improving with Botox and Myrbetriq    - Admits intermittent SOB, swelling.  - Denies chest pain.    Following with cardiology.     Medical history: DM, HTN, CAD, CHF, anemia, hearing loss, skin cancer s/p excision     Type 2 Diabetes:   Diagnosed with diabetes: 2005   Complications: CAD, hx foot ulcer    Current regimen includes:  basal insulin BID, farxiga (reports $300 at [facility])  Has tried: metformin (stopped due to CHF)     Compliance with medications is good.  - HTN: carvedilol, Bumetanide, spironolactone  - HLD: atorvastatin 20 mg   - Neuropathy: gabapentin   - Aspirin: 81 mg, Plavix     -Diabetes education/nutritionist: completed in past; agreeable for referral     CGM Download  -Dates reviewed: 2/13/2025 to 2/26/2025  -Data: 64% Target range, 27% high, 7% very high, 2% low, 0% very low; variability: 33%, GMI: 7.2%  -CGM Interpretation: Frequent hypoglycemia/downtrending  blood sugar and early morning.  Target range morning and midday with hyperglycemia postprandially and evening.    DM Health Maintenance:  Ophthalmology: 11/2024; hx retinopathy; follows q4m   Monofilament / Foot exam: performed today  Urine microalbumin: cr: declines today  GFR: 60s 2/6/2025  LDL: unknown last     Social Hx:  Diet: Meals: 2x/day Breakfast: occasional granola/yogurt if low Lunch/Dinner: meat + vegetable Snacks/Dessert: popcorn or chex or fruit Drinks: water, occasional diet soda, coconut water (diet)   Exercise: minimal    The following portions of the patient's history were reviewed and updated as appropriate: allergies, current medications, past family history, past medical history, past social history, past surgical history and problem list.     Past Medical History:   Diagnosis Date    Adenomatous polyp of colon     Bilateral exudative age-related macular degeneration     BMI 33.0-33.9,adult     BPH associated with nocturia     Bursitis of left elbow     Chronic fatigue     Chronic systolic heart failure     CKD (chronic kidney disease)     Diabetic retinopathy     Diastolic dysfunction     Dilated cardiomyopathy     Elevated lipids     Frequent falls     Insomnia, unspecified     Long term (current) use of insulin     Mild episode of recurrent major depressive disorder     Mixed hyperlipidemia     Nocturia     Other long term (current) drug therapy     Perennial allergic rhinitis     Peripheral polyneuropathy     Primary osteoarthritis involving multiple joints     Rotator cuff tear     Skin cancer     Small vessel coronary artery disease     Type 2 diabetes mellitus with diabetic neuropathy, with long-term current use of insulin     Vitamin D insufficiency      Past Surgical History:   Procedure Laterality Date    CATARACT EXTRACTION      ELBOW OLECRANNON BURSA EXCISION      KNEE ARTHROSCOPY Bilateral     ROTATOR CUFF REPAIR        Family History   Problem Relation Age of Onset    Stroke Mother      Diabetes Mother     Cancer Mother     Diabetes Maternal Grandmother     Heart disease Maternal Grandmother       Social History     Socioeconomic History    Marital status:    Tobacco Use    Smoking status: Never    Smokeless tobacco: Never   Vaping Use    Vaping status: Never Used   Substance and Sexual Activity    Alcohol use: Yes    Drug use: Never    Sexual activity: Defer      Allergies   Allergen Reactions    Lortab [Hydrocodone-Acetaminophen] Nausea Only      Current Outpatient Medications on File Prior to Visit   Medication Sig Dispense Refill    Alpha-Lipoic Acid 600 MG tablet Take  by mouth. Indications: Muscle Cramps      aspirin 81 MG chewable tablet Chew 1 tablet Daily.      atorvastatin (LIPITOR) 20 MG tablet Take 1 tablet by mouth Daily. 90 tablet 3    bumetanide (BUMEX) 2 MG tablet Take 1 tablet by mouth Daily. Prn  Indications: High Blood Pressure      carvedilol (COREG) 6.25 MG tablet Take 1 tablet by mouth 2 (Two) Times a Day With Meals. Indications: High Blood Pressure 180 tablet 3    cetirizine (zyrTEC) 10 MG tablet Take 1 tablet by mouth Daily. Indications: Hayfever      Cinnamon 500 MG capsule Take  by mouth.      clopidogrel (PLAVIX) 75 MG tablet Take 1 tablet by mouth Daily.      coenzyme Q10 100 MG capsule Take  by mouth.      Continuous Glucose Sensor (FreeStyle Diana 3 Sensor) misc USE AS DIRECTED CHANGE EVERY 14 DAYS 6 each 3    dapagliflozin Propanediol (Farxiga) 10 MG tablet Take 10 mg by mouth Daily. Indications: Type 2 Diabetes 90 tablet 1    doxycycline (VIBRAMYCIN) 100 MG capsule Take 1 capsule by mouth 2 (Two) Times a Day. 14 capsule 0    eszopiclone (LUNESTA) 2 MG tablet Take 1 tablet by mouth Every Night. Take immediately before bedtime 30 tablet 5    finasteride (PROSCAR) 5 MG tablet Take 1 tablet by mouth Daily. 90 tablet 3    gabapentin (NEURONTIN) 100 MG capsule Take 1 capsule by mouth 2 (Two) Times a Day As Needed (neuropathy pain). Take 1 po bid for neuropathy  pain, and take 1 - 2 po qhs along with the 300mg pills as directed  Indications: Diabetes with Nerve Disease 270 capsule 1    gabapentin (NEURONTIN) 300 MG capsule Take 1 capsule by mouth Every Night. Indications: Diabetes with Nerve Disease 90 capsule 1    Insulin Pen Needle (Droplet Pen Needles) 31G X 8 MM misc Inject 1 each under the skin into the appropriate area as directed 2 (Two) Times a Day. 200 each 3    lidocaine (LIDODERM) 5 % Place 1 patch on the skin as directed by provider Daily. Remove & Discard patch within 12 hours or as directed by MD. Must leave off for 12 hours before applying new patch 30 patch 11    Mirabegron ER (MYRBETRIQ) 50 MG tablet sustained-release 24 hour 24 hr tablet Myrbetriq 50 mg tablet,extended release   Take 1 tablet every day by oral route in the morning.      montelukast (SINGULAIR) 10 MG tablet Take 1 tablet by mouth Every Evening. 90 tablet 3    multivitamin with minerals tablet tablet Take 1 tablet by mouth Daily. WITH FOOD      pantoprazole (PROTONIX) 40 MG EC tablet Take 1 tablet by mouth Daily. 90 tablet 3    spironolactone (ALDACTONE) 25 MG tablet Take 1 tablet by mouth Daily. 90 tablet 1    traMADol (ULTRAM) 50 MG tablet Take 1 tablet by mouth Every 6 (Six) Hours As Needed for Moderate Pain.      venlafaxine XR (EFFEXOR-XR) 37.5 MG 24 hr capsule Take 1 capsule by mouth Daily. Indications: Major Depressive Disorder 90 capsule 3    [DISCONTINUED] Insulin Glargine (Lantus SoloStar) 100 UNIT/ML injection pen Inject 30-40 units SC bid AS DIRECTED (dose varies based on glucose readings) 90 mL 3     No current facility-administered medications on file prior to visit.        Review of Systems   Constitutional:  Positive for appetite change and fatigue. Negative for activity change.   HENT:  Positive for congestion, hearing loss, rhinorrhea and tinnitus.    Eyes:  Positive for itching and visual disturbance.   Respiratory:  Positive for chest tightness and shortness of breath.   "  Cardiovascular:  Positive for leg swelling. Negative for chest pain and palpitations.   Gastrointestinal:  Positive for constipation, diarrhea and GERD. Negative for abdominal pain.   Endocrine: Positive for cold intolerance.   Genitourinary:  Positive for urinary incontinence.   Musculoskeletal:  Positive for arthralgias, gait problem, joint swelling and myalgias.   Skin:  Positive for wound.   Neurological:  Positive for dizziness, weakness and light-headedness.   Hematological:  Bruises/bleeds easily.   Psychiatric/Behavioral:  Positive for sleep disturbance and depressed mood.         /48 (BP Location: Right arm, Patient Position: Sitting, Cuff Size: Adult)   Pulse 73   Ht 177.8 cm (70\")   Wt 101 kg (222 lb)   SpO2 98%   BMI 31.85 kg/m²      Physical Exam  Constitutional:       Appearance: Normal appearance. He is obese.      Comments: Using rollator walker   Cardiovascular:      Rate and Rhythm: Normal rate.      Pulses:           Dorsalis pedis pulses are 0 on the right side and 0 on the left side.        Posterior tibial pulses are 0 on the right side and 0 on the left side.   Pulmonary:      Effort: Pulmonary effort is normal.   Musculoskeletal:         General: Normal range of motion.      Right foot: No deformity.      Left foot: No deformity.   Feet:      Right foot:      Protective Sensation: 10 sites tested.  0 sites sensed.      Skin integrity: Dry skin present. No ulcer.      Toenail Condition: Right toenails are abnormally thick.      Left foot:      Protective Sensation: 10 sites tested.  0 sites sensed.      Skin integrity: Ulcer, callus and dry skin present.      Toenail Condition: Left toenails are abnormally thick.      Comments: Diabetic Foot Exam Performed and Monofilament Test Performed    Left great toe with <1 cm round ulceration with overlying callus; unable to stage; without discharge or surrounding erythema.      Skin:     General: Skin is warm and dry.   Neurological:      " "General: No focal deficit present.      Mental Status: He is alert and oriented to person, place, and time.   Psychiatric:         Mood and Affect: Mood normal.         Behavior: Behavior normal.         LABS AND IMAGING  CMP:  Lab Results   Component Value Date    Glucose 161 (H) 05/06/2024    BUN 29 (H) 05/06/2024    BUN/Creatinine Ratio 29 (H) 05/06/2024    Creatinine 1.01 05/06/2024    EGFR Result 77 05/06/2024    Total CO2 27 05/06/2024    Calcium 9.3 05/06/2024    Albumin 4.4 05/06/2024    AST (SGOT) 22 05/06/2024    ALT (SGPT) 14 05/06/2024     Lipid Panel:  Lab Results   Component Value Date    TRIG 93 09/18/2023    HDL 36 (L) 09/18/2023    VLDL 18 09/18/2023    LDL 25 09/18/2023     HbA1c:  Hemoglobin A1C   Date Value Ref Range Status   02/26/2025 5.7 4.5 - 5.7 % Final     Glucose:  No results found for: \"POCGLU\"  Microalbumin:  No results found for: \"MALBCRERATIO\"  TSH:  Lab Results   Component Value Date    TSH 2.280 05/06/2024       Assessment and Plan    Diagnoses and all orders for this visit:    1. Diabetic ulcer of toe of left foot associated with type 2 diabetes mellitus, with other ulcer severity (Primary)  Assessment & Plan:  Left great toe with <1 cm round ulceration with overlying callus; unable to stage; without discharge or surrounding erythema.    Unsure depth given overlying callus; advised further evaluation with wound clinic.  Advised BEN for further evaluation PAD.     Orders:  -     Doppler Ankle Brachial Index Single Level CAR; Future  -     Ambulatory Referral to Wound Clinic    2. Type 2 diabetes mellitus with hypoglycemia without coma, with long-term current use of insulin  Assessment & Plan:  Diabetes is not controlled; frequent hypoglycemia  A1C 5.7%; likely higher due to anemia; GMI 7.2% today.  CGM Interpretation: Frequent hypoglycemia/downtrending blood sugar and early morning.  Target range morning and midday with hyperglycemia postprandially and evening.    Discussed high dose " basal insulin contributor to lows.  Advised switching to basal-bolus insulin.  Rx: Decrease lantus 40 units nightly - titrate 2 units every 3 days as needed if fasting BG >140. Start Humalog 4 units before meals with CF 1:50 mg/dl over 150. Continue farxiga. Continue CGM.   Cautioned risk for hypoglycemia; glucose supplement/glucagon as needed.    Close follow-up for insulin adjustment, ulceration.     Foot exam today with neuropathy, ulceration left great toe.  Microalbumin due; defers today unable to give sample.  Eye exam utd, continue following with opthalmology for diabetic eye disease.  LDL unknown last; continue statin  BP at goal <130/80; continue current regimen.     Discussed complications associated with diabetes  Discussed diet: plate method;  Increase lean protein and vegetable intake  Avoid concentrated sugar drinks, such as sodas, and processed carbs including crackers, cookies, cakes  Exercise: Recommend at least 30 minutes of exercise daily, at least 5 days per week. Increase exercise gradually and encouraged strength exercises 2-3 days per week.    Blood Glucose Goal: Blood glucose goal <150 fasting, <180 2 hr postprandial. Encouraged monitoring daily.   Discussed yearly microalbumin, annual eye examinations with Ophthalmology, and foot care.  Discussed medications, side effects and compliance.  Discussed hypoglycemia management and prevention.   Reviewed ‘ABCs’ of diabetes management (respective goals in parentheses):  A1C (<7), blood pressure (<130/80), and cholesterol (LDL <100, if CVD <70).      Orders:  -     POC Glycosylated Hemoglobin (Hb A1C)  -     Insulin Lispro, 1 Unit Dial, (HumaLOG KwikPen) 100 UNIT/ML solution pen-injector; Inject 4 Units under the skin into the appropriate area as directed 3 (Three) Times a Day Before Meals. Add 1 unit per 50 mg/dl over 150. Max daily dose 30 units  Dispense: 27 mL; Refill: 0  -     Microalbumin / Creatinine Urine Ratio - Urine, Clean Catch  -      glucagon (GLUCAGEN) 1 MG injection; Inject 1 mg under the skin into the appropriate area as directed See Admin Instructions for 365 doses. Follow package directions for low blood sugar.  Dispense: 1 kit; Refill: 0  -     Insulin Glargine (Lantus SoloStar) 100 UNIT/ML injection pen; Inject 40 Units under the skin into the appropriate area as directed Every Night. Titrate 2 units every 3 days as needed if fasting blood sugar continues more than 140. Max daily dose 50 units.  Dispense: 45 mL; Refill: 1    3. Diminished pulses in lower extremity  Assessment & Plan:  Advised BEN for further evaluation.       Orders:  -     Doppler Ankle Brachial Index Single Level CAR; Future         Return in about 4 weeks (around 3/26/2025) for follow-up diabetes. The patient was instructed to contact the clinic with any interval questions or concerns.    Electronically signed by: Arabella Eddy PA-C   Endocrinology    Please note that portions of this note were completed with a voice recognition program.

## 2025-02-26 NOTE — PATIENT INSTRUCTIONS
Diabetes Treatment Recommendations  02/26/25     Joe Fung 1945     Your A1C is:   Lab Results   Component Value Date    HGBA1C 5.7 02/26/2025    HGBA1C 5.4 04/03/2024    HGBA1C 5.4 09/18/2023       ADA General Goals: A1c: < 7%                                                  Fasting/before meal glucose: <150 mg/dL                                    2 Hour after meal glucoses: < 180 mg/dL                                        Bedtime glucose:120-180                                                                Glucose testing frequency: daily before insulin use    Medication Changes:   - STOP morning lantus   - continue Farxiga 10 mg once daily     Insulin dosing:  Basal insulin (Long acting) Lantus  40 units at bedtime  Increase by 2 units every 3 days if fasting blood sugar is more than 140.   Keep at current dose if fasting blood sugar is between .  Decrease by 2 units if fasting blood sugar is less than 80 or concerns for hypoglycemia overnight or between meals.             Mealtime/Bolus Insulin (Short acting) Humalog (U-100) 4 units before meals.   Add additional correction insulin if blood sugar before meal is more than 150:   If skipping meal/4 hours since last injection and blood sugar is above 150 use correction insulin only:    Correction insulin (add to meal insulin)   0 unit  if glucose less than 150   1unit   if glucose  150 - 199   2 units if glucose 200 - 249   3 units if glucose 250 - 299   4 units if glucose 300 - 349   5  units if glucose Greater than 350     Keep records of your glucose levels and insulin adjustments.   We may ask you to keep records on the content of your meals with insulin doses and before/after meal glucose levels to evaluate your ratios.    Call for advice if you have unexplained or unexpected hypoglycemia  (glucose < 70) or persistent high glucose > 200.    Office: [phone]    Arabella Eddy PA-C

## 2025-02-27 ENCOUNTER — TELEPHONE (OUTPATIENT)
Dept: ENDOCRINOLOGY | Facility: CLINIC | Age: 80
End: 2025-02-27
Payer: MEDICARE

## 2025-02-27 PROBLEM — E11.9 TYPE 2 DIABETES MELLITUS, WITH LONG-TERM CURRENT USE OF INSULIN: Status: ACTIVE | Noted: 2025-02-26

## 2025-02-27 PROBLEM — E11.649 TYPE 2 DIABETES MELLITUS WITH HYPOGLYCEMIA, WITH LONG-TERM CURRENT USE OF INSULIN: Status: ACTIVE | Noted: 2025-02-26

## 2025-02-27 RX ORDER — INSULIN GLARGINE 100 [IU]/ML
40 INJECTION, SOLUTION SUBCUTANEOUS NIGHTLY
Qty: 45 ML | Refills: 1 | Status: SHIPPED | OUTPATIENT
Start: 2025-02-27 | End: 2025-03-03

## 2025-02-27 NOTE — TELEPHONE ENCOUNTER
Pt wife Winter called wanting to know if pt can drink Premier protein shake before his humalog injecting will this raise his blood sugar pt has not pickup prescription was out of stock and will be in stock today. Pt will start taking Humalog on Friday morning. Winter is aware Arabella is out today can wait until Friday when Arabella returns.

## 2025-02-27 NOTE — ASSESSMENT & PLAN NOTE
Left great toe with <1 cm round ulceration with overlying callus; unable to stage; without discharge or surrounding erythema.    Unsure depth given overlying callus; advised further evaluation with wound clinic.  Advised BEN for further evaluation PAD.

## 2025-02-27 NOTE — ASSESSMENT & PLAN NOTE
Diabetes is not controlled; frequent hypoglycemia  A1C 5.7%; likely higher due to anemia; GMI 7.2% today.  CGM Interpretation: Frequent hypoglycemia/downtrending blood sugar and early morning.  Target range morning and midday with hyperglycemia postprandially and evening.    Discussed high dose basal insulin contributor to lows.  Advised switching to basal-bolus insulin.  Rx: Decrease lantus 40 units nightly - titrate 2 units every 3 days as needed if fasting BG >140. Start Humalog 4 units before meals with CF 1:50 mg/dl over 150. Continue farxiga. Continue CGM.   Cautioned risk for hypoglycemia; glucose supplement/glucagon as needed.    Close follow-up for insulin adjustment, ulceration.     Foot exam today with neuropathy, ulceration left great toe.  Microalbumin due; defers today unable to give sample.  Eye exam utd, continue following with opthalmology for diabetic eye disease.  LDL unknown last; continue statin  BP at goal <130/80; continue current regimen.     Discussed complications associated with diabetes  Discussed diet: plate method;  Increase lean protein and vegetable intake  Avoid concentrated sugar drinks, such as sodas, and processed carbs including crackers, cookies, cakes  Exercise: Recommend at least 30 minutes of exercise daily, at least 5 days per week. Increase exercise gradually and encouraged strength exercises 2-3 days per week.    Blood Glucose Goal: Blood glucose goal <150 fasting, <180 2 hr postprandial. Encouraged monitoring daily.   Discussed yearly microalbumin, annual eye examinations with Ophthalmology, and foot care.  Discussed medications, side effects and compliance.  Discussed hypoglycemia management and prevention.   Reviewed ‘ABCs’ of diabetes management (respective goals in parentheses):  A1C (<7), blood pressure (<130/80), and cholesterol (LDL <100, if CVD <70).

## 2025-02-28 NOTE — TELEPHONE ENCOUNTER
Spoke with patient's wife in regards to Humalog use at breakfast of having protein shake. Discussed there is a small amount of carb contained in the shake and advised to continue Humalog 4 units before meals as discussed if blood sugars more than 140.  Anticipate on starting this regimen later today/tomorrow.  Advised to contact office if additional questions or concerns.

## 2025-03-03 ENCOUNTER — TELEPHONE (OUTPATIENT)
Dept: ENDOCRINOLOGY | Facility: CLINIC | Age: 80
End: 2025-03-03
Payer: MEDICARE

## 2025-03-03 DIAGNOSIS — Z79.4 TYPE 2 DIABETES MELLITUS WITH HYPOGLYCEMIA WITHOUT COMA, WITH LONG-TERM CURRENT USE OF INSULIN: ICD-10-CM

## 2025-03-03 DIAGNOSIS — E11.649 TYPE 2 DIABETES MELLITUS WITH HYPOGLYCEMIA WITHOUT COMA, WITH LONG-TERM CURRENT USE OF INSULIN: ICD-10-CM

## 2025-03-03 RX ORDER — INSULIN GLARGINE 100 [IU]/ML
28 INJECTION, SOLUTION SUBCUTANEOUS NIGHTLY
Start: 2025-03-03

## 2025-03-03 RX ORDER — INSULIN LISPRO 100 [IU]/ML
2 INJECTION, SOLUTION INTRAVENOUS; SUBCUTANEOUS
Start: 2025-03-03

## 2025-03-03 NOTE — TELEPHONE ENCOUNTER
Spoke with wife in regard to concerns for low blood sugars. Reports using lantus 30 units nightly for the past week, but increased to 35 units last night due to blood sugar elevation. Took 5 units Humalog at dinner. Skips humalog at times if BG <150.     Sensor download shows low blood sugars overnight with occasional postprandial hyperglycemia. Advised decrease lantus 28 units and humalog 2 units with CF 1:50 mg/dl over 150. Advised only increase lantus if fasting blood sugars are >140 for more than 3 days.   Continue to notify if concerns for hypo or hyperglycemia.

## 2025-03-03 NOTE — TELEPHONE ENCOUNTER
PATIENT STARTED TAKING HUMALOG ON 3/1/25. THIS MORNING AT 9:20 AM HIS FASTING SUGARS WAS 54. HE HAD SOME JUICE AND SOME DONUT HOLES AND AT 10:33 SUGARS WAS 66. HE IS NOW READY TO EAT A BOWL OF SPECIAL K CEREAL WITH STRAWBERRYS AND 2 PERCENT MILK. SHE IS NOT SURE HOW MUCH HUMALOG PATIENT NEEDS TO GET THIS MORNING. PHONE NUMBER [phone]

## 2025-03-04 ENCOUNTER — OFFICE VISIT (OUTPATIENT)
Dept: FAMILY MEDICINE CLINIC | Facility: CLINIC | Age: 80
End: 2025-03-04
Payer: MEDICARE

## 2025-03-04 VITALS
HEIGHT: 70 IN | BODY MASS INDEX: 31.71 KG/M2 | WEIGHT: 221.5 LBS | SYSTOLIC BLOOD PRESSURE: 110 MMHG | DIASTOLIC BLOOD PRESSURE: 52 MMHG | HEART RATE: 99 BPM

## 2025-03-04 DIAGNOSIS — E11.42 TYPE 2 DIABETES MELLITUS WITH PERIPHERAL NEUROPATHY: ICD-10-CM

## 2025-03-04 DIAGNOSIS — I51.89 DIASTOLIC DYSFUNCTION: ICD-10-CM

## 2025-03-04 DIAGNOSIS — Z79.899 ENCOUNTER FOR LONG-TERM (CURRENT) USE OF HIGH-RISK MEDICATION: ICD-10-CM

## 2025-03-04 DIAGNOSIS — E11.3593 PROLIFERATIVE DIABETIC RETINOPATHY OF BOTH EYES ASSOCIATED WITH TYPE 2 DIABETES MELLITUS, UNSPECIFIED PROLIFERATIVE RETINOPATHY TYPE: ICD-10-CM

## 2025-03-04 DIAGNOSIS — E78.2 MIXED HYPERLIPIDEMIA: ICD-10-CM

## 2025-03-04 DIAGNOSIS — K21.9 GASTROESOPHAGEAL REFLUX DISEASE WITHOUT ESOPHAGITIS: ICD-10-CM

## 2025-03-04 DIAGNOSIS — Z79.4 TYPE 2 DIABETES MELLITUS WITH HYPOGLYCEMIA WITHOUT COMA, WITH LONG-TERM CURRENT USE OF INSULIN: ICD-10-CM

## 2025-03-04 DIAGNOSIS — I10 ESSENTIAL HYPERTENSION: ICD-10-CM

## 2025-03-04 DIAGNOSIS — E11.42 TYPE 2 DIABETES MELLITUS WITH PERIPHERAL NEUROPATHY: Primary | ICD-10-CM

## 2025-03-04 DIAGNOSIS — D53.9 DEFICIENCY ANEMIA: ICD-10-CM

## 2025-03-04 DIAGNOSIS — I50.22 CHRONIC SYSTOLIC HEART FAILURE: ICD-10-CM

## 2025-03-04 DIAGNOSIS — I25.10 ATHEROSCLEROSIS OF NATIVE CORONARY ARTERY OF NATIVE HEART WITHOUT ANGINA PECTORIS: ICD-10-CM

## 2025-03-04 DIAGNOSIS — E11.649 TYPE 2 DIABETES MELLITUS WITH HYPOGLYCEMIA WITHOUT COMA, WITH LONG-TERM CURRENT USE OF INSULIN: ICD-10-CM

## 2025-03-04 DIAGNOSIS — R53.83 OTHER FATIGUE: ICD-10-CM

## 2025-03-04 DIAGNOSIS — K76.0 FATTY LIVER DISEASE, NONALCOHOLIC: ICD-10-CM

## 2025-03-04 RX ORDER — LIDOCAINE 50 MG/G
1 PATCH TOPICAL EVERY 24 HOURS
Qty: 30 PATCH | Refills: 11 | Status: SHIPPED | OUTPATIENT
Start: 2025-03-04

## 2025-03-04 RX ORDER — VENLAFAXINE HYDROCHLORIDE 75 MG/1
75 CAPSULE, EXTENDED RELEASE ORAL DAILY
Qty: 90 CAPSULE | Refills: 3 | Status: SHIPPED | OUTPATIENT
Start: 2025-03-04

## 2025-03-04 RX ORDER — GABAPENTIN 300 MG/1
CAPSULE ORAL
Qty: 180 CAPSULE | Refills: 1 | Status: SHIPPED | OUTPATIENT
Start: 2025-03-04

## 2025-03-04 RX ORDER — GABAPENTIN 100 MG/1
CAPSULE ORAL
Qty: 360 CAPSULE | Refills: 1 | Status: SHIPPED | OUTPATIENT
Start: 2025-03-04

## 2025-03-04 NOTE — PROGRESS NOTES
Chief Complaint  Diabetes and Discuss different pain med     Subjective      Joe Fung presents to [facility]  Diabetes  Pertinent negatives for hypoglycemia include no nervousness/anxiousness, seizures or speech difficulty. Pertinent negatives for diabetes include no chest pain.     Patient is here for 6-month follow-up on diabetes and multiple other chronic issues.  The patient's A1c has been extremely good over the last couple of years, but his wife continued to be very concerned about the fact that his glucose level would spike up in the evening, and he would have intermittent hypoglycemic episodes.  We discussed his management several times but it seems like we were having difficulty so he was referred to endocrinology.  His Lantus was stopped and he is just taking mealtime insulin now, and the continuous glucose monitor is helping tremendously as the alarm goes off before his glucose gets dangerously low.    The patient is also been in contact with his cardiologist about the management of his diuretics.  He had gone to the emergency department a few weeks ago and the doctor in the emergency department stopped his Bumex and placed him on torsemide, although I do not really understand why the medicine was changed.  Nonetheless, the patient and his wife were advised that I would not feel comfortable making changes unless he discussed them with his cardiologist first.  The cardiologist has given him a prescription for torsemide and he takes 20 to 40 mg daily depending on his apparent fluid status.  His wife is looking at his legs daily, and she knows that the patient is supposed to be weighing every day, but he has been reluctant to do that since it requires her help and he does not want to bother her.  The importance of this was stressed to the patient and I explained that it would help his wife with his overall management if he would agree to weigh every day.    The patient  "does continue to have significant neuropathic pain in his feet.  At times he is doing well with his current dose of gabapentin, but then he has bad days periodically when a sharp stabbing burning pain will happen recurrently throughout the day, and nighttime is always the worst time.  We have discussed the management of this many many times over the last many years, since the patient also has unsteadiness on his feet and impaired balance, and has been prone to falling.  He does use lidocaine patches on his feet, but does not use them regularly, although I am not sure why.  I discussed his options again with the patient and his wife at length today, including further adjustments in gabapentin, trying to switch to gabapentin to Lyrica, and the pros and cons of each were discussed at length.  They both understand that if he takes a higher dose of gabapentin that he has to potentially be more drowsy and more dizzy or wobbly when he stands up, but there is no \"free lunch\".  He does have tramadol that he can use as needed as well, but that also increases dizziness and fall risk.  He acknowledges that he does feel sort of \"worthless\" at times and like he is a burden on his wife.  Objective   Vital Signs:   Vitals:    03/04/25 1037   BP: 110/52   BP Location: Left arm   Patient Position: Sitting   Cuff Size: Adult   Pulse: 99   Weight: 100 kg (221 lb 8 oz)   Height: 177.8 cm (70\")      /52 (BP Location: Left arm, Patient Position: Sitting, Cuff Size: Adult)   Pulse 99   Ht 177.8 cm (70\")   Wt 100 kg (221 lb 8 oz)   BMI 31.78 kg/m²     Body mass index is 31.78 kg/m².    Review of Systems   Constitutional:  Negative for activity change, appetite change and fever.   HENT:  Negative for sore throat and trouble swallowing.    Eyes:  Negative for redness.   Respiratory:  Negative for cough, choking and chest tightness.    Cardiovascular:  Negative for chest pain and palpitations.   Gastrointestinal:  Negative for " abdominal pain, blood in stool, nausea and vomiting.   Genitourinary:  Negative for dysuria and hematuria.   Musculoskeletal:  Positive for gait problem. Negative for arthralgias, back pain, joint swelling, myalgias and neck pain.   Skin:  Negative for rash.   Neurological:  Positive for light-headedness and numbness. Negative for seizures, syncope, speech difficulty and headache.   Hematological:  Negative for adenopathy.   Psychiatric/Behavioral:  Positive for sleep disturbance and stress. Negative for suicidal ideas. The patient is not nervous/anxious.        Past History:  Medical History: has a past medical history of Adenomatous polyp of colon, Bilateral exudative age-related macular degeneration, BMI 33.0-33.9,adult, BPH associated with nocturia, Bursitis of left elbow, Chronic fatigue, Chronic systolic heart failure, CKD (chronic kidney disease), Diabetic retinopathy, Diastolic dysfunction, Dilated cardiomyopathy, Elevated lipids, Frequent falls, Insomnia, unspecified, Long term (current) use of insulin, Mild episode of recurrent major depressive disorder, Mixed hyperlipidemia, Nocturia, Other long term (current) drug therapy, Perennial allergic rhinitis, Peripheral polyneuropathy, Primary osteoarthritis involving multiple joints, Rotator cuff tear, Skin cancer, Small vessel coronary artery disease, Type 2 diabetes mellitus with diabetic neuropathy, with long-term current use of insulin, and Vitamin D insufficiency.   Surgical History: has a past surgical history that includes Knee arthroscopy (Bilateral); Rotator cuff repair; Elbow bursa surgery; and Cataract extraction.   Family History: family history includes Cancer in his mother; Diabetes in his maternal grandmother and mother; Heart disease in his maternal grandmother; Stroke in his mother.   Social History: reports that he has never smoked. He has never used smokeless tobacco. He reports current alcohol use. He reports that he does not use  drugs.      Current Outpatient Medications:     Alpha-Lipoic Acid 600 MG tablet, Take  by mouth. Indications: Muscle Cramps, Disp: , Rfl:     atorvastatin (LIPITOR) 20 MG tablet, Take 1 tablet by mouth Daily., Disp: 90 tablet, Rfl: 3    carvedilol (COREG) 6.25 MG tablet, Take 1 tablet by mouth 2 (Two) Times a Day With Meals. Indications: High Blood Pressure, Disp: 180 tablet, Rfl: 3    cetirizine (zyrTEC) 10 MG tablet, Take 1 tablet by mouth Daily. Indications: Hayfever, Disp: , Rfl:     Cinnamon 500 MG capsule, Take  by mouth., Disp: , Rfl:     clopidogrel (PLAVIX) 75 MG tablet, Take 1 tablet by mouth Daily., Disp: , Rfl:     coenzyme Q10 100 MG capsule, Take  by mouth., Disp: , Rfl:     Continuous Glucose Sensor (FreeStyle Diana 3 Sensor) misc, USE AS DIRECTED CHANGE EVERY 14 DAYS, Disp: 6 each, Rfl: 3    dapagliflozin Propanediol (Farxiga) 10 MG tablet, Take 10 mg by mouth Daily. Indications: Type 2 Diabetes, Disp: 90 tablet, Rfl: 1    eszopiclone (LUNESTA) 2 MG tablet, Take 1 tablet by mouth Every Night. Take immediately before bedtime, Disp: 30 tablet, Rfl: 5    finasteride (PROSCAR) 5 MG tablet, Take 1 tablet by mouth Daily., Disp: 90 tablet, Rfl: 3    glucagon (GLUCAGEN) 1 MG injection, Inject 1 mg under the skin into the appropriate area as directed See Admin Instructions for 365 doses. Follow package directions for low blood sugar., Disp: 1 kit, Rfl: 0    Insulin Glargine (Lantus SoloStar) 100 UNIT/ML injection pen, Inject 28 Units under the skin into the appropriate area as directed Every Night. Titrate 2 units every 3 days as needed if fasting blood sugar continues more than 140. Max daily dose 50 units., Disp: , Rfl:     Insulin Lispro, 1 Unit Dial, (HumaLOG KwikPen) 100 UNIT/ML solution pen-injector, Inject 2 Units under the skin into the appropriate area as directed 3 (Three) Times a Day Before Meals. Add 1 unit per 50 mg/dl over 150. Max daily dose 30 units, Disp: , Rfl:     Insulin Pen Needle  (Droplet Pen Needles) 31G X 8 MM misc, Inject 1 each under the skin into the appropriate area as directed 2 (Two) Times a Day., Disp: 200 each, Rfl: 3    Mirabegron ER (MYRBETRIQ) 50 MG tablet sustained-release 24 hour 24 hr tablet, Myrbetriq 50 mg tablet,extended release  Take 1 tablet every day by oral route in the morning., Disp: , Rfl:     montelukast (SINGULAIR) 10 MG tablet, Take 1 tablet by mouth Every Evening., Disp: 90 tablet, Rfl: 3    multivitamin with minerals tablet tablet, Take 1 tablet by mouth Daily. WITH FOOD, Disp: , Rfl:     pantoprazole (PROTONIX) 40 MG EC tablet, Take 1 tablet by mouth Daily., Disp: 90 tablet, Rfl: 3    spironolactone (ALDACTONE) 25 MG tablet, Take 1 tablet by mouth Daily., Disp: 90 tablet, Rfl: 1    traMADol (ULTRAM) 50 MG tablet, Take 1 tablet by mouth Every 6 (Six) Hours As Needed for Moderate Pain., Disp: , Rfl:     bumetanide (BUMEX) 2 MG tablet, Take 1 tablet by mouth Daily. Prn  Indications: High Blood Pressure (Patient not taking: Reported on 3/4/2025), Disp: , Rfl:     gabapentin (NEURONTIN) 100 MG capsule, Take 1-2 po bid for neuropathy pain, and take 1 - 2 po qhs along with the 300mg pills as directed  Indications: Diabetes with Nerve Disease, Disp: 360 capsule, Rfl: 1    gabapentin (NEURONTIN) 300 MG capsule, Take 1-2 po qhs as needed for neuropathy  Indications: Diabetes with Nerve Disease, Disp: 180 capsule, Rfl: 1    lidocaine (LIDODERM) 5 %, Place 1 patch on the skin as directed by provider Daily. Remove & Discard patch within 12 hours or as directed by MD. Must leave off for 12 hours before applying new patch, Disp: 30 patch, Rfl: 11    venlafaxine XR (Effexor XR) 75 MG 24 hr capsule, Take 1 capsule by mouth Daily. For depression and for neuropathy pain, Disp: 90 capsule, Rfl: 3    Allergies: Lortab [hydrocodone-acetaminophen]    Physical Exam  Constitutional:       General: He is not in acute distress.     Appearance: He is obese. He is not toxic-appearing.    HENT:      Head: Normocephalic and atraumatic.      Right Ear: Ear canal and external ear normal.      Left Ear: Ear canal and external ear normal.      Nose: Nose normal.      Mouth/Throat:      Mouth: Mucous membranes are moist.      Pharynx: Oropharynx is clear.   Eyes:      General: No scleral icterus.     Extraocular Movements: Extraocular movements intact.      Conjunctiva/sclera: Conjunctivae normal.      Pupils: Pupils are equal, round, and reactive to light.   Neck:      Vascular: No carotid bruit.   Cardiovascular:      Rate and Rhythm: Normal rate. Rhythm irregular.      Pulses: Normal pulses.      Heart sounds: Normal heart sounds.   Pulmonary:      Effort: Pulmonary effort is normal.      Breath sounds: Normal breath sounds. No wheezing or rales.   Chest:      Chest wall: No tenderness.   Abdominal:      General: Bowel sounds are normal. There is no distension.      Palpations: Abdomen is soft. There is no mass.      Tenderness: There is no abdominal tenderness. There is no guarding or rebound.   Musculoskeletal:         General: No swelling or deformity. Normal range of motion.      Cervical back: Normal range of motion. No rigidity.      Right lower leg: No edema.      Left lower leg: No edema.   Lymphadenopathy:      Cervical: No cervical adenopathy.   Skin:     General: Skin is warm and dry.      Capillary Refill: Capillary refill takes less than 2 seconds.      Coloration: Skin is not jaundiced.      Findings: No rash.   Neurological:      Mental Status: He is alert and oriented to person, place, and time.      Cranial Nerves: No cranial nerve deficit, dysarthria or facial asymmetry.      Motor: No weakness.      Coordination: Coordination normal.      Gait: Gait abnormal (Unsteady, walks with his wife's assistance or uses a walker).   Psychiatric:         Attention and Perception: Attention and perception normal.         Mood and Affect: Mood normal.         Speech: Speech normal.          Behavior: Behavior normal.         Thought Content: Thought content normal.         Cognition and Memory: Cognition and memory normal.         Judgment: Judgment normal.      Comments: Patient does not appear overly anxious or depressed, but he does describe some depressive symptoms                   Assessment and Plan   Diagnoses and all orders for this visit:    1. Type 2 diabetes mellitus with peripheral neuropathy (Primary)  Patient is following up with endocrinology about his diabetes management, though I have told the patient and his wife it in the long run I can refill his diabetes medicines once I determined that he is adequately stabilized.  We discussed the management of his neuropathy at length, and the patient may take more of the gabapentin in the daytime and/or at nighttime if needed, but I also recommended that we try increasing the dose of the venlafaxine extended release to 75 mg daily since this can help with the neuropathy pain.  The patient also does espouse some depression symptoms although is not overtly depressed at this time, and hopefully this will help.  If not, and they wish to have pain management referral or try switching to Lyrica they will let me know.  He was also encouraged to use lidocaine patches daily, as long as he has time off the patch at least 12 hours/day.  The patient and his wife indicate that he has had a plethora of lab work done in the last couple weeks with his cardiologist and hematologist, and the endocrinologist did recently check his A1c, so I am going to hold off on doing any additional lab work at this time  2. Type 2 diabetes mellitus with hypoglycemia without coma, with long-term current use of insulin  See above  3. Mixed hyperlipidemia    4. Encounter for long-term (current) use of high-risk medication    5. Fatty liver disease, nonalcoholic  Patient is following up in GI clinic  6. Chronic systolic heart failure  Managed by cardiology  7. Deficiency  anemia  Per hematology, mostly anemia of chronic disease  8. Gastroesophageal reflux disease without esophagitis    9. Diastolic dysfunction  Managed by cardiology  10. Essential hypertension    11. Proliferative diabetic retinopathy of both eyes associated with type 2 diabetes mellitus, unspecified proliferative retinopathy type  Patient is blind in both eyes and declines to go back to ophthalmology or retina specialist since they have indicated that there is not much else that can be done for him  12. Atherosclerosis of native coronary artery of native heart without angina pectoris  Managed by cardiology  13. Other fatigue            Follow Up   Return in about 6 months (around 9/4/2025) for Annual physical, Nurse - AW[facility] Subsequent.  Patient was given instructions and counseling regarding his condition or for health maintenance advice. Please see specific information pulled into the AVS if appropriate.     Vimal Irene MD

## 2025-03-05 ENCOUNTER — TELEPHONE (OUTPATIENT)
Dept: ENDOCRINOLOGY | Facility: CLINIC | Age: 80
End: 2025-03-05
Payer: MEDICARE

## 2025-03-05 NOTE — TELEPHONE ENCOUNTER
The [facility] received a fax that requires your attention. The document has been indexed to the patient’s chart for your review.      Reason for sending: MESSAGE FROM [facility]    Documents Description: EXT MED REC [facility] 2/28/25    Name of Sender: [facility]    Date Indexed: 2/28/25    Notes (if needed):

## 2025-03-11 ENCOUNTER — OFFICE VISIT (OUTPATIENT)
Dept: ONCOLOGY | Facility: CLINIC | Age: 80
End: 2025-03-11
Payer: MEDICARE

## 2025-03-11 VITALS
TEMPERATURE: 97.7 F | HEIGHT: 70 IN | BODY MASS INDEX: 31.64 KG/M2 | RESPIRATION RATE: 18 BRPM | WEIGHT: 221 LBS | HEART RATE: 71 BPM | SYSTOLIC BLOOD PRESSURE: 104 MMHG | DIASTOLIC BLOOD PRESSURE: 78 MMHG | OXYGEN SATURATION: 97 %

## 2025-03-11 DIAGNOSIS — D53.9 MACROCYTIC ANEMIA: Primary | ICD-10-CM

## 2025-03-11 NOTE — LETTER
March 11, 2025     Vimal Irene MD  [address] [facility] [address]    Patient: Joe Fung   YOB: 1945   Date of Visit: 3/11/2025     Dear Vimal Irene MD:       Thank you for referring Joe Fung to me for evaluation. Below are the relevant portions of my assessment and plan of care.    If you have questions, please do not hesitate to call me. I look forward to following Joe along with you.         Sincerely,        Afshin Das MD        CC: MD Cory Alexandra MD Mary Matthews, PA-C Hicks, Lee G, MD  03/11/25 1552  Sign when Signing Visit  CHIEF COMPLAINT: No new somatic complaint    Problem List:  Oncology/Hematology History Overview Note   1.  Macrocytic anemia nonetheless with iron deficiency anemia  2.  History of adenoma  3.  Diastolic dysfunction   4.  Diabetes  5.  Hyperlipidemia  6.  Polyneuropathy  7.  Macular degeneration  8.  Chronic kidney disease  9.  Diarrhea felt to be due to Bumex  10.  Osteoarthritis  11.  Cirrhosis identified on gallbladder ultrasound April 2024  -Seen by Marlene JEAN APRN June 2024  -6/13/2024 CT abdomen and pelvis shows hepatic steatosis, cholelithiasis without CT evidence of cholecystitis.  No acute abdominopelvic process.  -6/26/2024 labs from GI mitochondria M2 Ab negative <20, actin smooth muscle IgG  Ab 3, negative.  Total bilirubin 1.6, AST 17, ALT 29.  Hepatitis panel negative  -9/30/2024 CMP total bilirubin 1.7, AST 13, ALT 13  -1/27/2025 ER visit [facility] for shortness of air, CBC WBC 7.0, hemoglobin 9.4, hematocrit 30.6%, .7, platelet count 179,000.  CMP creatinine 1.10, glucose 136, calcium 8.3, AST 64, ALT 43, total bilirubin 2.8.    Hematology history timeline:  -2019 colonoscopy with polyps.  Next 1 due 2024.  -12/22/2020 iron 91.  Ferritin 63.6  -November 2021 hemoglobin 12.4 with normal ferritin, iron, B12, folate Per primary care note.  -2/10/2022 iron 82 ferritin 67.5.  Folic  "acid greater than 22.5.  Hemoglobin 11.9 with MCV 97 and normal white count platelet count and differential.  - 3/16/2022 EGD duodenal biopsy x2, stomach antrum biopsy x2 unremarkable.  Descending colon polyp on colonoscopy as well as sigmoid polyp both tubular adenomas without dysplasia    -5/31/2022 [facility] initial consultation: Here because of his \"anemia\".  He is barely anemic with normochromic normocytic indices which would make iron deficiency or folate deficiency or B12 deficiency unlikely and none of the serologies suggest this.  His wife and he are both convinced he has had an EGD and a colonoscopy done within the last year with Dr. Jerez and I will try to get those results but in the meantime before putting him through an M panel, hemolytic work-up, megaloblastic work-up, and certainly before do anything more aggressive like a bone marrow biopsy to look for myelodysplasia, I will simply repeat his blood work now and again in 4 to 5 months to see what the trend is.  If it is normalizing or stable he is not sure how much work-up he did want to do.  The odds of myelodysplasia or some more sinister marrow disorder with normochromic normocytic indices is unlikely.  With a combination of mild chronic kidney disease and polyneuropathy, if his anemia worsens we will also check the M panel for possible monoclonal gammopathy as a hint towards plasma cell dyscrasia.    -5/31/2022 hemoglobin 11.5 With MCV 96 otherwise unremarkable CBC and differential.  -6/23/2022 hemoglobin 13 MCV 97.  Ferritin 87 with iron 73, B12 greater than 2000, and folate greater than 20  - 10/6/2022 hemoglobin 12.8 with MCV 99.2 otherwise unremarkable CBC    -10/11/2022 [facility] follow-up: Over the last 6 months, he essentially has not been anemic.  He is mildly macrocytic but without B12 or folate deficiency.  No further hematologic evaluation at this junction unless he develops significant anemia I.  E.  Refer him " back if he needs less than a hemoglobin of 10 but otherwise no further hematology work-up.  I will see as needed.     -1/27/2025 ER visit with c/o SOA and weakness.  Hgb 9.4, Hct 30.6, .7, Plt 179,000.  Total Bili 2.8, AST 64, ALT 43, Alk Phos 77. NT-Pro-BNP 3057.  Chest x-ray shows similar cardiomegaly without appreciable edema, no other acute findings.    -2/4/2025 abdominal ultrasound shows increased echogenicity of the liver suggestive of steatosis.  No suspicious focal liver lesions detected.  Portal vein shows antegrade flow within the main portal vein.  Gallstones noted within the otherwise unremarkable gallbladder.  Common duct 3 mm.  Visible portions of the pancreas appear unremarkable.  Spleen is normal size measuring 12.5 cm.  No ascites.  -2/6/2025 [facility] hematology clinic follow-up: Since we released Mr. Fung to follow-up in October 2022 at that time his anemia had resolved, more currently he is now presenting with macrocytic anemia in the face of newly diagnosed cirrhosis.  Abdominal ultrasound showed some portal vein anterograde flow and echogenicity of the liver suggestive of steatosis.  He has not had follow-up as recommended with GI, his wife is going to reach out to get an appointment.  His bilirubin has worsened over the last several months, he did not appear overtly jaundiced today.  For further evaluation of his anemia we will get a peripheral smear, check for hemolysis with haptoglobin and LDH, I will also check a megaloblastic panel, I will repeat his CMP, Iron studies, direct Coral, reticulocyte count, erythropoietin level and total and indirect bilirubin, also for reversible causes checking his B12, methylmalonic acid and folate.  Most likely his anemia is due to his cirrhosis and portal hypertension.  He follows with Dr. Corey cardiology, he has not had a follow-up since his ER visit, his BNP was elevated at over 3000.  I have encouraged his wife to get him in sooner than his  scheduled follow-up in April.  We will see him back in 3-4 weeks for follow-up to go over these results.    - 2/6/2025 hemoglobin 10.8  RDW 16.6 with normal white count platelet count and differential.  Peripheral smear review by pathology showed macrocytic anemia with mild nonspecific anisocytosis.  No schistocytes.  Normal white count and platelet count and morphologies.   sodium 146 bicarb 34 BUN 28 glucose 129 otherwise unremarkable CMP save for bilirubin 2 direct bilirubin 0.6 indirect bilirubin 1.4 all mildly elevated.  Normal AST and ALT.  Hemolytic panel negative but haptoglobin not as ordered.   upper limit of normal 227.  Reticulocyte count 3.27% uncorrected.  Rheumatoid factor negative.  TANI negative.  Erythropoietin normal 43.5.  Megaloblastic panel normal but folate not done as ordered B12 greater than 600.  Methylmalonic acid normal 219.  Homocystine normal 17.4.  Iron panel consistent with iron deficiency: Ferritin normal 58.  Iron low 56 lower limit of normal 65 with iron saturation low 14.8% and normal total iron binding capacity 377    -3/11/2025 [facility] hematology follow-up: Though he has macrocytic anemia and I do believe this is related to his underlying liver disease, he actually has iron deficiency anemia which typically is microcytic.  Nonetheless he is not B12 or folate deficient and is not hemolyzing.  They did not draw his folate level and I will check that with his next blood draw with iron panel and folic acid level prior to return in 4 months in the meantime we will get him in with Dr. Ely who is now covering Dr. Jerez's office to look for bleeding sources from varices or malignancy with a EGD and colonoscopy.     Macrocytic anemia       HISTORY OF PRESENT ILLNESS:  The patient is a 80 y.o. male, here for follow up on management of cirrhosis with macrocytic anemia    Past Medical History:   Diagnosis Date   • Adenomatous polyp of colon    • Bilateral exudative  "age-related macular degeneration    • BMI 33.0-33.9,adult    • BPH associated with nocturia    • Bursitis of left elbow    • Chronic fatigue    • Chronic systolic heart failure    • CKD (chronic kidney disease)    • Diabetic retinopathy    • Diastolic dysfunction    • Dilated cardiomyopathy    • Elevated lipids    • Frequent falls    • Insomnia, unspecified    • Long term (current) use of insulin    • Mild episode of recurrent major depressive disorder    • Mixed hyperlipidemia    • Nocturia    • Other long term (current) drug therapy    • Perennial allergic rhinitis    • Peripheral polyneuropathy    • Primary osteoarthritis involving multiple joints    • Rotator cuff tear    • Skin cancer    • Small vessel coronary artery disease    • Type 2 diabetes mellitus with diabetic neuropathy, with long-term current use of insulin    • Vitamin D insufficiency      Past Surgical History:   Procedure Laterality Date   • CATARACT EXTRACTION     • ELBOW OLECRANNON BURSA EXCISION     • KNEE ARTHROSCOPY Bilateral    • ROTATOR CUFF REPAIR         Allergies   Allergen Reactions   • Lortab [Hydrocodone-Acetaminophen] Nausea Only       Family History and Social History reviewed and changed as necessary    REVIEW OF SYSTEM:   No new somatic complaint    PHYSICAL EXAM:  No jaundice icterus or pallor.  No respiratory distress.    Vitals:    03/11/25 1509   BP: 104/78   Pulse: 71   Resp: 18   Temp: 97.7 °F (36.5 °C)   SpO2: 97%   Weight: 100 kg (221 lb)   Height: 177.8 cm (70\")     Vitals:    03/11/25 1509   PainSc: 4    PainLoc: Back          ECOG score: 1           Vitals reviewed.    ECOG: (1) Restricted in Physically Strenuous Activity, Ambulatory & Able to Do Work of Light Nature    Lab Results   Component Value Date    HGB 10.5 (L) 10/17/2024    HCT 31.9 (L) 10/17/2024    MCV 96 10/17/2024     10/17/2024    WBC 6.9 10/17/2024    NEUTROABS 4.7 10/17/2024    LYMPHSABS 1.5 10/17/2024    MONOSABS 0.5 10/17/2024    EOSABS 0.2 " 10/17/2024    BASOSABS 0.0 10/17/2024       Lab Results   Component Value Date    GLUCOSE 161 (H) 05/06/2024    BUN 29 (H) 05/06/2024    CREATININE 1.01 05/06/2024     05/06/2024    K 4.4 05/06/2024     05/06/2024    CO2 27 05/06/2024    CALCIUM 9.3 05/06/2024    PROTEINTOT 6.7 05/06/2024    ALBUMIN 4.4 05/06/2024    BILITOT 1.8 (H) 05/06/2024    ALKPHOS 97 05/06/2024    AST 22 05/06/2024    ALT 14 05/06/2024             ASSESSMENT & PLAN:  1.  Macrocytic anemia nonetheless with iron deficiency anemia  2.  History of adenoma  3.  Diastolic dysfunction   4.  Diabetes  5.  Hyperlipidemia  6.  Polyneuropathy  7.  Macular degeneration  8.  Chronic kidney disease  9.  Diarrhea felt to be due to Bumex  10.  Osteoarthritis  11.  Cirrhosis identified on gallbladder ultrasound April 2024  -Seen by MIRANDA, SATNAM Ceballos June 2024  -6/13/2024 CT abdomen and pelvis shows hepatic steatosis, cholelithiasis without CT evidence of cholecystitis.  No acute abdominopelvic process.  -6/26/2024 labs from GI mitochondria M2 Ab negative <20, actin smooth muscle IgG  Ab 3, negative.  Total bilirubin 1.6, AST 17, ALT 29.  Hepatitis panel negative  -9/30/2024 CMP total bilirubin 1.7, AST 13, ALT 13  -1/27/2025 ER visit [facility] for shortness of air, CBC WBC 7.0, hemoglobin 9.4, hematocrit 30.6%, .7, platelet count 179,000.  CMP creatinine 1.10, glucose 136, calcium 8.3, AST 64, ALT 43, total bilirubin 2.8.    Hematology history timeline:  -2019 colonoscopy with polyps.  Next 1 due 2024.  -12/22/2020 iron 91.  Ferritin 63.6  -November 2021 hemoglobin 12.4 with normal ferritin, iron, B12, folate Per primary care note.  -2/10/2022 iron 82 ferritin 67.5.  Folic acid greater than 22.5.  Hemoglobin 11.9 with MCV 97 and normal white count platelet count and differential.  - 3/16/2022 EGD duodenal biopsy x2, stomach antrum biopsy x2 unremarkable.  Descending colon polyp on colonoscopy as well as sigmoid polyp both  "tubular adenomas without dysplasia    -5/31/2022 [facility] hematology initial consultation: Here because of his \"anemia\".  He is barely anemic with normochromic normocytic indices which would make iron deficiency or folate deficiency or B12 deficiency unlikely and none of the serologies suggest this.  His wife and he are both convinced he has had an EGD and a colonoscopy done within the last year with Dr. Jerez and I will try to get those results but in the meantime before putting him through an M panel, hemolytic work-up, megaloblastic work-up, and certainly before do anything more aggressive like a bone marrow biopsy to look for myelodysplasia, I will simply repeat his blood work now and again in 4 to 5 months to see what the trend is.  If it is normalizing or stable he is not sure how much work-up he did want to do.  The odds of myelodysplasia or some more sinister marrow disorder with normochromic normocytic indices is unlikely.  With a combination of mild chronic kidney disease and polyneuropathy, if his anemia worsens we will also check the M panel for possible monoclonal gammopathy as a hint towards plasma cell dyscrasia.    -5/31/2022 hemoglobin 11.5 With MCV 96 otherwise unremarkable CBC and differential.  -6/23/2022 hemoglobin 13 MCV 97.  Ferritin 87 with iron 73, B12 greater than 2000, and folate greater than 20  - 10/6/2022 hemoglobin 12.8 with MCV 99.2 otherwise unremarkable CBC    -10/11/2022 [facility] hematology follow-up: Over the last 6 months, he essentially has not been anemic.  He is mildly macrocytic but without B12 or folate deficiency.  No further hematologic evaluation at this junction unless he develops significant anemia I.  E.  Refer him back if he needs less than a hemoglobin of 10 but otherwise no further hematology work-up.  I will see as needed.     -1/27/2025 ER visit with c/o SOA and weakness.  Hgb 9.4, Hct 30.6, .7, Plt 179,000.  Total Bili 2.8, AST 64, ALT 43, Alk Phos 77. " NT-Pro-BNP 3057.  Chest x-ray shows similar cardiomegaly without appreciable edema, no other acute findings.    -2/4/2025 abdominal ultrasound shows increased echogenicity of the liver suggestive of steatosis.  No suspicious focal liver lesions detected.  Portal vein shows antegrade flow within the main portal vein.  Gallstones noted within the otherwise unremarkable gallbladder.  Common duct 3 mm.  Visible portions of the pancreas appear unremarkable.  Spleen is normal size measuring 12.5 cm.  No ascites.  -2/6/2025 [facility] hematology clinic follow-up: Since we released Mr. Fung to follow-up in October 2022 at that time his anemia had resolved, more currently he is now presenting with macrocytic anemia in the face of newly diagnosed cirrhosis.  Abdominal ultrasound showed some portal vein anterograde flow and echogenicity of the liver suggestive of steatosis.  He has not had follow-up as recommended with GI, his wife is going to reach out to get an appointment.  His bilirubin has worsened over the last several months, he did not appear overtly jaundiced today.  For further evaluation of his anemia we will get a peripheral smear, check for hemolysis with haptoglobin and LDH, I will also check a megaloblastic panel, I will repeat his CMP, Iron studies, direct Coral, reticulocyte count, erythropoietin level and total and indirect bilirubin, also for reversible causes checking his B12, methylmalonic acid and folate.  Most likely his anemia is due to his cirrhosis and portal hypertension.  He follows with Dr. Corey cardiology, he has not had a follow-up since his ER visit, his BNP was elevated at over 3000.  I have encouraged his wife to get him in sooner than his scheduled follow-up in April.  We will see him back in 3-4 weeks for follow-up to go over these results.    - 2/6/2025 hemoglobin 10.8  RDW 16.6 with normal white count platelet count and differential.  Peripheral smear review by pathology showed  macrocytic anemia with mild nonspecific anisocytosis.  No schistocytes.  Normal white count and platelet count and morphologies.   sodium 146 bicarb 34 BUN 28 glucose 129 otherwise unremarkable CMP save for bilirubin 2 direct bilirubin 0.6 indirect bilirubin 1.4 all mildly elevated.  Normal AST and ALT.  Hemolytic panel negative but haptoglobin not as ordered.   upper limit of normal 227.  Reticulocyte count 3.27% uncorrected.  Rheumatoid factor negative.  TANI negative.  Erythropoietin normal 43.5.  Megaloblastic panel normal but folate not done as ordered B12 greater than 600.  Methylmalonic acid normal 219.  Homocystine normal 17.4.  Iron panel consistent with iron deficiency: Ferritin normal 58.  Iron low 56 lower limit of normal 65 with iron saturation low 14.8% and normal total iron binding capacity 377    -3/11/2025 [facility] hematology follow-up: Though he has macrocytic anemia and I do believe this is related to his underlying liver disease, he actually has iron deficiency anemia which typically is microcytic.  Nonetheless he is not B12 or folate deficient and is not hemolyzing.  They did not draw his folate level and I will check that with his next blood draw with iron panel and folic acid level prior to return in 4 months in the meantime we will get him in with Dr. Ely who is now covering Dr. Jerez's office to look for bleeding sources from varices or malignancy with a EGD and colonoscopy.  Told him to hold the clopidogrel a week before the biopsy.    Total time of care today inclusive of time spent today prior to patient's arrival reviewing interval data and during visit translating information to patient putting forth plan as outlined above after visit instituting this plan took 50 minutes patient care time throughout the day today.  Afshin Das MD    03/11/2025

## 2025-03-11 NOTE — PROGRESS NOTES
"CHIEF COMPLAINT: No new somatic complaint    Problem List:  Oncology/Hematology History Overview Note   1.  Macrocytic anemia nonetheless with iron deficiency anemia  2.  History of adenoma  3.  Diastolic dysfunction   4.  Diabetes  5.  Hyperlipidemia  6.  Polyneuropathy  7.  Macular degeneration  8.  Chronic kidney disease  9.  Diarrhea felt to be due to Bumex  10.  Osteoarthritis  11.  Cirrhosis identified on gallbladder ultrasound April 2024  -Seen by MIRANDA, Marlene Zamorano, SATNAM June 2024  -6/13/2024 CT abdomen and pelvis shows hepatic steatosis, cholelithiasis without CT evidence of cholecystitis.  No acute abdominopelvic process.  -6/26/2024 labs from GI mitochondria M2 Ab negative <20, actin smooth muscle IgG  Ab 3, negative.  Total bilirubin 1.6, AST 17, ALT 29.  Hepatitis panel negative  -9/30/2024 CMP total bilirubin 1.7, AST 13, ALT 13  -1/27/2025 ER visit [facility] for shortness of air, CBC WBC 7.0, hemoglobin 9.4, hematocrit 30.6%, .7, platelet count 179,000.  CMP creatinine 1.10, glucose 136, calcium 8.3, AST 64, ALT 43, total bilirubin 2.8.    Hematology history timeline:  -2019 colonoscopy with polyps.  Next 1 due 2024.  -12/22/2020 iron 91.  Ferritin 63.6  -November 2021 hemoglobin 12.4 with normal ferritin, iron, B12, folate Per primary care note.  -2/10/2022 iron 82 ferritin 67.5.  Folic acid greater than 22.5.  Hemoglobin 11.9 with MCV 97 and normal white count platelet count and differential.  - 3/16/2022 EGD duodenal biopsy x2, stomach antrum biopsy x2 unremarkable.  Descending colon polyp on colonoscopy as well as sigmoid polyp both tubular adenomas without dysplasia    -5/31/2022 [facility] hematology initial consultation: Here because of his \"anemia\".  He is barely anemic with normochromic normocytic indices which would make iron deficiency or folate deficiency or B12 deficiency unlikely and none of the serologies suggest this.  His wife and he are both convinced he has had an EGD and " a colonoscopy done within the last year with Dr. Jerez and I will try to get those results but in the meantime before putting him through an M panel, hemolytic work-up, megaloblastic work-up, and certainly before do anything more aggressive like a bone marrow biopsy to look for myelodysplasia, I will simply repeat his blood work now and again in 4 to 5 months to see what the trend is.  If it is normalizing or stable he is not sure how much work-up he did want to do.  The odds of myelodysplasia or some more sinister marrow disorder with normochromic normocytic indices is unlikely.  With a combination of mild chronic kidney disease and polyneuropathy, if his anemia worsens we will also check the M panel for possible monoclonal gammopathy as a hint towards plasma cell dyscrasia.    -5/31/2022 hemoglobin 11.5 With MCV 96 otherwise unremarkable CBC and differential.  -6/23/2022 hemoglobin 13 MCV 97.  Ferritin 87 with iron 73, B12 greater than 2000, and folate greater than 20  - 10/6/2022 hemoglobin 12.8 with MCV 99.2 otherwise unremarkable CBC    -10/11/2022 [facility] hematology follow-up: Over the last 6 months, he essentially has not been anemic.  He is mildly macrocytic but without B12 or folate deficiency.  No further hematologic evaluation at this junction unless he develops significant anemia I.  E.  Refer him back if he needs less than a hemoglobin of 10 but otherwise no further hematology work-up.  I will see as needed.     -1/27/2025 ER visit with c/o SOA and weakness.  Hgb 9.4, Hct 30.6, .7, Plt 179,000.  Total Bili 2.8, AST 64, ALT 43, Alk Phos 77. NT-Pro-BNP 3057.  Chest x-ray shows similar cardiomegaly without appreciable edema, no other acute findings.    -2/4/2025 abdominal ultrasound shows increased echogenicity of the liver suggestive of steatosis.  No suspicious focal liver lesions detected.  Portal vein shows antegrade flow within the main portal vein.  Gallstones noted within the otherwise  unremarkable gallbladder.  Common duct 3 mm.  Visible portions of the pancreas appear unremarkable.  Spleen is normal size measuring 12.5 cm.  No ascites.  -2/6/2025 [facility] hematology clinic follow-up: Since we released Mr. Fung to follow-up in October 2022 at that time his anemia had resolved, more currently he is now presenting with macrocytic anemia in the face of newly diagnosed cirrhosis.  Abdominal ultrasound showed some portal vein anterograde flow and echogenicity of the liver suggestive of steatosis.  He has not had follow-up as recommended with GI, his wife is going to reach out to get an appointment.  His bilirubin has worsened over the last several months, he did not appear overtly jaundiced today.  For further evaluation of his anemia we will get a peripheral smear, check for hemolysis with haptoglobin and LDH, I will also check a megaloblastic panel, I will repeat his CMP, Iron studies, direct Coral, reticulocyte count, erythropoietin level and total and indirect bilirubin, also for reversible causes checking his B12, methylmalonic acid and folate.  Most likely his anemia is due to his cirrhosis and portal hypertension.  He follows with Dr. Corey cardiology, he has not had a follow-up since his ER visit, his BNP was elevated at over 3000.  I have encouraged his wife to get him in sooner than his scheduled follow-up in April.  We will see him back in 3-4 weeks for follow-up to go over these results.    - 2/6/2025 hemoglobin 10.8  RDW 16.6 with normal white count platelet count and differential.  Peripheral smear review by pathology showed macrocytic anemia with mild nonspecific anisocytosis.  No schistocytes.  Normal white count and platelet count and morphologies.   sodium 146 bicarb 34 BUN 28 glucose 129 otherwise unremarkable CMP save for bilirubin 2 direct bilirubin 0.6 indirect bilirubin 1.4 all mildly elevated.  Normal AST and ALT.  Hemolytic panel negative but haptoglobin not as  ordered.   upper limit of normal 227.  Reticulocyte count 3.27% uncorrected.  Rheumatoid factor negative.  TANI negative.  Erythropoietin normal 43.5.  Megaloblastic panel normal but folate not done as ordered B12 greater than 600.  Methylmalonic acid normal 219.  Homocystine normal 17.4.  Iron panel consistent with iron deficiency: Ferritin normal 58.  Iron low 56 lower limit of normal 65 with iron saturation low 14.8% and normal total iron binding capacity 377    -3/11/2025 [facility] hematology follow-up: Though he has macrocytic anemia and I do believe this is related to his underlying liver disease, he actually has iron deficiency anemia which typically is microcytic.  Nonetheless he is not B12 or folate deficient and is not hemolyzing.  They did not draw his folate level and I will check that with his next blood draw with iron panel and folic acid level prior to return in 4 months in the meantime we will get him in with Dr. Ely who is now covering Dr. Jerez's office to look for bleeding sources from varices or malignancy with a EGD and colonoscopy.     Macrocytic anemia       HISTORY OF PRESENT ILLNESS:  The patient is a 80 y.o. male, here for follow up on management of cirrhosis with macrocytic anemia    Past Medical History:   Diagnosis Date    Adenomatous polyp of colon     Bilateral exudative age-related macular degeneration     BMI 33.0-33.9,adult     BPH associated with nocturia     Bursitis of left elbow     Chronic fatigue     Chronic systolic heart failure     CKD (chronic kidney disease)     Diabetic retinopathy     Diastolic dysfunction     Dilated cardiomyopathy     Elevated lipids     Frequent falls     Insomnia, unspecified     Long term (current) use of insulin     Mild episode of recurrent major depressive disorder     Mixed hyperlipidemia     Nocturia     Other long term (current) drug therapy     Perennial allergic rhinitis     Peripheral polyneuropathy     Primary osteoarthritis  "involving multiple joints     Rotator cuff tear     Skin cancer     Small vessel coronary artery disease     Type 2 diabetes mellitus with diabetic neuropathy, with long-term current use of insulin     Vitamin D insufficiency      Past Surgical History:   Procedure Laterality Date    CATARACT EXTRACTION      ELBOW OLECRANNON BURSA EXCISION      KNEE ARTHROSCOPY Bilateral     ROTATOR CUFF REPAIR         Allergies   Allergen Reactions    Lortab [Hydrocodone-Acetaminophen] Nausea Only       Family History and Social History reviewed and changed as necessary    REVIEW OF SYSTEM:   No new somatic complaint    PHYSICAL EXAM:  No jaundice icterus or pallor.  No respiratory distress.    Vitals:    03/11/25 1509   BP: 104/78   Pulse: 71   Resp: 18   Temp: 97.7 °F (36.5 °C)   SpO2: 97%   Weight: 100 kg (221 lb)   Height: 177.8 cm (70\")     Vitals:    03/11/25 1509   PainSc: 4    PainLoc: Back          ECOG score: 1           Vitals reviewed.    ECOG: (1) Restricted in Physically Strenuous Activity, Ambulatory & Able to Do Work of Light Nature    Lab Results   Component Value Date    HGB 10.5 (L) 10/17/2024    HCT 31.9 (L) 10/17/2024    MCV 96 10/17/2024     10/17/2024    WBC 6.9 10/17/2024    NEUTROABS 4.7 10/17/2024    LYMPHSABS 1.5 10/17/2024    MONOSABS 0.5 10/17/2024    EOSABS 0.2 10/17/2024    BASOSABS 0.0 10/17/2024       Lab Results   Component Value Date    GLUCOSE 161 (H) 05/06/2024    BUN 29 (H) 05/06/2024    CREATININE 1.01 05/06/2024     05/06/2024    K 4.4 05/06/2024     05/06/2024    CO2 27 05/06/2024    CALCIUM 9.3 05/06/2024    PROTEINTOT 6.7 05/06/2024    ALBUMIN 4.4 05/06/2024    BILITOT 1.8 (H) 05/06/2024    ALKPHOS 97 05/06/2024    AST 22 05/06/2024    ALT 14 05/06/2024             ASSESSMENT & PLAN:  1.  Macrocytic anemia nonetheless with iron deficiency anemia  2.  History of adenoma  3.  Diastolic dysfunction   4.  Diabetes  5.  Hyperlipidemia  6.  Polyneuropathy  7.  Macular " "degeneration  8.  Chronic kidney disease  9.  Diarrhea felt to be due to Bumex  10.  Osteoarthritis  11.  Cirrhosis identified on gallbladder ultrasound April 2024  -Seen by Marlene JEAN APRN June 2024  -6/13/2024 CT abdomen and pelvis shows hepatic steatosis, cholelithiasis without CT evidence of cholecystitis.  No acute abdominopelvic process.  -6/26/2024 labs from GI mitochondria M2 Ab negative <20, actin smooth muscle IgG  Ab 3, negative.  Total bilirubin 1.6, AST 17, ALT 29.  Hepatitis panel negative  -9/30/2024 CMP total bilirubin 1.7, AST 13, ALT 13  -1/27/2025 ER visit [facility] for shortness of air, CBC WBC 7.0, hemoglobin 9.4, hematocrit 30.6%, .7, platelet count 179,000.  CMP creatinine 1.10, glucose 136, calcium 8.3, AST 64, ALT 43, total bilirubin 2.8.    Hematology history timeline:  -2019 colonoscopy with polyps.  Next 1 due 2024.  -12/22/2020 iron 91.  Ferritin 63.6  -November 2021 hemoglobin 12.4 with normal ferritin, iron, B12, folate Per primary care note.  -2/10/2022 iron 82 ferritin 67.5.  Folic acid greater than 22.5.  Hemoglobin 11.9 with MCV 97 and normal white count platelet count and differential.  - 3/16/2022 EGD duodenal biopsy x2, stomach antrum biopsy x2 unremarkable.  Descending colon polyp on colonoscopy as well as sigmoid polyp both tubular adenomas without dysplasia    -5/31/2022 [facility] hematology initial consultation: Here because of his \"anemia\".  He is barely anemic with normochromic normocytic indices which would make iron deficiency or folate deficiency or B12 deficiency unlikely and none of the serologies suggest this.  His wife and he are both convinced he has had an EGD and a colonoscopy done within the last year with Dr. Jerez and I will try to get those results but in the meantime before putting him through an M panel, hemolytic work-up, megaloblastic work-up, and certainly before do anything more aggressive like a bone marrow biopsy to look for " myelodysplasia, I will simply repeat his blood work now and again in 4 to 5 months to see what the trend is.  If it is normalizing or stable he is not sure how much work-up he did want to do.  The odds of myelodysplasia or some more sinister marrow disorder with normochromic normocytic indices is unlikely.  With a combination of mild chronic kidney disease and polyneuropathy, if his anemia worsens we will also check the M panel for possible monoclonal gammopathy as a hint towards plasma cell dyscrasia.    -5/31/2022 hemoglobin 11.5 With MCV 96 otherwise unremarkable CBC and differential.  -6/23/2022 hemoglobin 13 MCV 97.  Ferritin 87 with iron 73, B12 greater than 2000, and folate greater than 20  - 10/6/2022 hemoglobin 12.8 with MCV 99.2 otherwise unremarkable CBC    -10/11/2022 [facility] hematology follow-up: Over the last 6 months, he essentially has not been anemic.  He is mildly macrocytic but without B12 or folate deficiency.  No further hematologic evaluation at this junction unless he develops significant anemia I.  E.  Refer him back if he needs less than a hemoglobin of 10 but otherwise no further hematology work-up.  I will see as needed.     -1/27/2025 ER visit with c/o SOA and weakness.  Hgb 9.4, Hct 30.6, .7, Plt 179,000.  Total Bili 2.8, AST 64, ALT 43, Alk Phos 77. NT-Pro-BNP 3057.  Chest x-ray shows similar cardiomegaly without appreciable edema, no other acute findings.    -2/4/2025 abdominal ultrasound shows increased echogenicity of the liver suggestive of steatosis.  No suspicious focal liver lesions detected.  Portal vein shows antegrade flow within the main portal vein.  Gallstones noted within the otherwise unremarkable gallbladder.  Common duct 3 mm.  Visible portions of the pancreas appear unremarkable.  Spleen is normal size measuring 12.5 cm.  No ascites.  -2/6/2025 [facility] hematology clinic follow-up: Since we released Mr. Fung to follow-up in October 2022 at that time his anemia  had resolved, more currently he is now presenting with macrocytic anemia in the face of newly diagnosed cirrhosis.  Abdominal ultrasound showed some portal vein anterograde flow and echogenicity of the liver suggestive of steatosis.  He has not had follow-up as recommended with GI, his wife is going to reach out to get an appointment.  His bilirubin has worsened over the last several months, he did not appear overtly jaundiced today.  For further evaluation of his anemia we will get a peripheral smear, check for hemolysis with haptoglobin and LDH, I will also check a megaloblastic panel, I will repeat his CMP, Iron studies, direct Coral, reticulocyte count, erythropoietin level and total and indirect bilirubin, also for reversible causes checking his B12, methylmalonic acid and folate.  Most likely his anemia is due to his cirrhosis and portal hypertension.  He follows with Dr. Corey cardiology, he has not had a follow-up since his ER visit, his BNP was elevated at over 3000.  I have encouraged his wife to get him in sooner than his scheduled follow-up in April.  We will see him back in 3-4 weeks for follow-up to go over these results.    - 2/6/2025 hemoglobin 10.8  RDW 16.6 with normal white count platelet count and differential.  Peripheral smear review by pathology showed macrocytic anemia with mild nonspecific anisocytosis.  No schistocytes.  Normal white count and platelet count and morphologies.   sodium 146 bicarb 34 BUN 28 glucose 129 otherwise unremarkable CMP save for bilirubin 2 direct bilirubin 0.6 indirect bilirubin 1.4 all mildly elevated.  Normal AST and ALT.  Hemolytic panel negative but haptoglobin not as ordered.   upper limit of normal 227.  Reticulocyte count 3.27% uncorrected.  Rheumatoid factor negative.  TANI negative.  Erythropoietin normal 43.5.  Megaloblastic panel normal but folate not done as ordered B12 greater than 600.  Methylmalonic acid normal 219.  Homocystine normal  17.4.  Iron panel consistent with iron deficiency: Ferritin normal 58.  Iron low 56 lower limit of normal 65 with iron saturation low 14.8% and normal total iron binding capacity 377    -3/11/2025 [facility] follow-up: Though he has macrocytic anemia and I do believe this is related to his underlying liver disease, he actually has iron deficiency anemia which typically is microcytic.  Nonetheless he is not B12 or folate deficient and is not hemolyzing.  They did not draw his folate level and I will check that with his next blood draw with iron panel and folic acid level prior to return in 4 months in the meantime we will get him in with Dr. Ely who is now covering Dr. Jerez's office to look for bleeding sources from varices or malignancy with a EGD and colonoscopy.  Told him to hold the clopidogrel a week before the biopsy.    Total time of care today inclusive of time spent today prior to patient's arrival reviewing interval data and during visit translating information to patient putting forth plan as outlined above after visit instituting this plan took 50 minutes patient care time throughout the day today.  Afshin Das MD    03/11/2025

## 2025-03-14 ENCOUNTER — TELEPHONE (OUTPATIENT)
Dept: ONCOLOGY | Facility: CLINIC | Age: 80
End: 2025-03-14
Payer: MEDICARE

## 2025-03-14 DIAGNOSIS — E11.621 DIABETIC ULCER OF TOE OF LEFT FOOT ASSOCIATED WITH TYPE 2 DIABETES MELLITUS, WITH OTHER ULCER SEVERITY: Primary | ICD-10-CM

## 2025-03-14 DIAGNOSIS — Z79.4 TYPE 2 DIABETES MELLITUS WITH HYPOGLYCEMIA WITHOUT COMA, WITH LONG-TERM CURRENT USE OF INSULIN: ICD-10-CM

## 2025-03-14 DIAGNOSIS — R09.89 DIMINISHED PULSES IN LOWER EXTREMITY: ICD-10-CM

## 2025-03-14 DIAGNOSIS — L97.528 DIABETIC ULCER OF TOE OF LEFT FOOT ASSOCIATED WITH TYPE 2 DIABETES MELLITUS, WITH OTHER ULCER SEVERITY: Primary | ICD-10-CM

## 2025-03-14 DIAGNOSIS — E11.649 TYPE 2 DIABETES MELLITUS WITH HYPOGLYCEMIA WITHOUT COMA, WITH LONG-TERM CURRENT USE OF INSULIN: ICD-10-CM

## 2025-03-14 RX ORDER — FERROUS GLUCONATE 324(38)MG
TABLET ORAL
Qty: 45 TABLET | Refills: 3 | Status: SHIPPED | OUTPATIENT
Start: 2025-03-14

## 2025-03-14 NOTE — TELEPHONE ENCOUNTER
Caller: KYREE RAMIREZ    Relationship: Emergency Contact    Best call back number: [phone]    What is the best time to reach you: ANY    Who are you requesting to speak with (clinical staff, provider,  specific staff member): CLINICAL       What was the call regarding: KYREE IS CALLING STATES THAT DAVIE WAS IN ON 3-11 AND A PRESCRIPTION FOR IRON WAS TO BE CALLED TO THE PHARMACY    [facility] DOES NOT HAVE THE MEDICATION AND SHE IS ASKING FOR IT TO BE CALLED IN     ALSO HOW MANY MG OF VITAMIN C DOES HE NEEDS TO TAKE AND WHAT BRAND           PLEASE ADVISE

## 2025-03-14 NOTE — TELEPHONE ENCOUNTER
Called Winter back and informed her that Rx was being sent and he can take Vitamin C 500-1000 mg with it. Patients wife verbalized understanding.

## 2025-03-18 DIAGNOSIS — D53.9 MACROCYTIC ANEMIA: Primary | ICD-10-CM

## 2025-03-25 ENCOUNTER — TELEPHONE (OUTPATIENT)
Dept: ENDOCRINOLOGY | Facility: CLINIC | Age: 80
End: 2025-03-25
Payer: MEDICARE

## 2025-03-25 NOTE — TELEPHONE ENCOUNTER
Pt's wife michell called stating pt's bs has morgan running 150-200+ at night. She requested a call back to consult.

## 2025-03-25 NOTE — TELEPHONE ENCOUNTER
Called and spoke to the wife she stated he is taking Farxiga 10 mg, Lantus 30 u nightly and Humalog with sliding scale.    He is sharing Diana report was printed and sent to the provider.    Please advise.

## 2025-03-26 NOTE — TELEPHONE ENCOUNTER
Spoke with patient's wife in regard to BG; reports elevated fasting readings high 100-200s; Improved daytime readings mid 100s. Has been taking Lantus 30 units hs; Humalog 4 units TID as needed before meals. Discussed lantus 40 units once daily at visit; denies concerns for low BG recently/unsure why started 30 units. Advised gradual titration lantus 34 units tonight and continue increase 2 units every 2 days if fasting is more than 140, max 40 units. Advised contact office if continues elevated or concerns for low BG.

## 2025-04-09 ENCOUNTER — OFFICE VISIT (OUTPATIENT)
Dept: ENDOCRINOLOGY | Facility: CLINIC | Age: 80
End: 2025-04-09
Payer: MEDICARE

## 2025-04-09 VITALS
HEIGHT: 70 IN | BODY MASS INDEX: 29.2 KG/M2 | SYSTOLIC BLOOD PRESSURE: 108 MMHG | DIASTOLIC BLOOD PRESSURE: 76 MMHG | HEART RATE: 76 BPM | WEIGHT: 204 LBS | OXYGEN SATURATION: 96 %

## 2025-04-09 DIAGNOSIS — E11.649 TYPE 2 DIABETES MELLITUS WITH HYPOGLYCEMIA WITHOUT COMA, WITH LONG-TERM CURRENT USE OF INSULIN: Primary | ICD-10-CM

## 2025-04-09 DIAGNOSIS — E11.621 DIABETIC ULCER OF TOE OF LEFT FOOT ASSOCIATED WITH TYPE 2 DIABETES MELLITUS, WITH FAT LAYER EXPOSED: ICD-10-CM

## 2025-04-09 DIAGNOSIS — L97.522 DIABETIC ULCER OF TOE OF LEFT FOOT ASSOCIATED WITH TYPE 2 DIABETES MELLITUS, WITH FAT LAYER EXPOSED: ICD-10-CM

## 2025-04-09 DIAGNOSIS — Z79.4 TYPE 2 DIABETES MELLITUS WITH HYPOGLYCEMIA WITHOUT COMA, WITH LONG-TERM CURRENT USE OF INSULIN: Primary | ICD-10-CM

## 2025-04-09 DIAGNOSIS — R29.6 FREQUENT FALLS: ICD-10-CM

## 2025-04-09 PROCEDURE — 3078F DIAST BP <80 MM HG: CPT | Performed by: PHYSICIAN ASSISTANT

## 2025-04-09 PROCEDURE — 1159F MED LIST DOCD IN RCRD: CPT | Performed by: PHYSICIAN ASSISTANT

## 2025-04-09 PROCEDURE — 3074F SYST BP LT 130 MM HG: CPT | Performed by: PHYSICIAN ASSISTANT

## 2025-04-09 PROCEDURE — 1160F RVW MEDS BY RX/DR IN RCRD: CPT | Performed by: PHYSICIAN ASSISTANT

## 2025-04-09 PROCEDURE — 99214 OFFICE O/P EST MOD 30 MIN: CPT | Performed by: PHYSICIAN ASSISTANT

## 2025-04-09 PROCEDURE — 95251 CONT GLUC MNTR ANALYSIS I&R: CPT | Performed by: PHYSICIAN ASSISTANT

## 2025-04-09 NOTE — PROGRESS NOTES
Chief Complaint   Patient presents with    Diabetes          Joe Fung is a 80 y.o. male presents today for follow-up evaluation of type 2 diabetes. They were last seen for this 2/26/2025, A1C 5.7%. Advised Decrease lantus 40 units nightly - titrate 2 units every 3 days as needed if fasting BG >140. Start Humalog 4 units before meals with CF 1:50 mg/dl over 150. Continue farxiga. Continue CGM.     Accompanied by wife at visit today who assists with care at home.     Lantus 40 units  Humalog 4-6 units; taking about twice daily right at meal.   Reports hyperglycemia in evenings after dinner/has milk for snack.     Debridement for sore to left great toe through wound clinic; healing well until dragged foot and reopened earlier this week.   Has had hx of ulcerations and previously following with wound clinic at [facility]. Has not followed since last visit. In process of scheduling BEN.    Fell twice since last visit; reaching down for dog; denies symptoms at the time. Fell in hallway 3/29; skinned arms/legs; unknown cause. Denies other injury.      - Vision loss with injections in past   - Chronic numbness/sensation loss. Admits sore to toe; healing.   - Admits constipation, BM 2x/week, occasional loose stools  - Admits intermittent heartburn taking PPI  - Admits intermittent SOB, swelling.    - Denies abdominal pain.  - Denies increased thirst or urination.   - Denies burning with urination. Hx leakage improving with Botox and Myrbetriq    - Denies chest pain.     Following with cardiology.      Medical history: DM, HTN, CAD, CHF, anemia, hearing loss, skin cancer s/p excision      Type 2 Diabetes:   Diagnosed with diabetes: 2005   Complications: CAD, hx foot ulcer     Current regimen includes:  basal insulin BID, farxiga (reports $300 at [facility])  Has tried: metformin (stopped due to CHF)     Compliance with medications is good.  - HTN: carvedilol, Bumetanide, spironolactone  - HLD: atorvastatin 20  mg   - Neuropathy: gabapentin   - Aspirin: 81 mg, Plavix      -Diabetes education/nutritionist: completed in past; agreeable for referral      CGM Download  -Dates reviewed: 3/27/2025 to 4/9/2025  -Data: 54% target range, 39% high, 7% very high, 0% low, 0% very low  -CGM Interpretation: Target range mostly overnight with postprandial hyperglycemia during day.  Delayed down trending occasionally overnight without hypoglycemia.    DM Health Maintenance:  Ophthalmology: 11/2024; hx retinopathy; follows q4m   Monofilament / Foot exam: 2/2025; ulceration left great toe  Urine microalbumin: cr: unable to perform at visit today  GFR: 60s 2/6/2025  LDL: unknown last      Social Hx:  Diet: Meals: 2x/day Breakfast: occasional granola/yogurt if low Lunch/Dinner: meat + vegetable Snacks/Dessert: popcorn or chex or fruit Drinks: water, occasional diet soda, coconut water (diet)   Exercise: minimal    The following portions of the patient's history were reviewed and updated as appropriate: allergies, current medications, past family history, past medical history, past social history, past surgical history and problem list.    Past Medical History:   Diagnosis Date    Adenomatous polyp of colon     Bilateral exudative age-related macular degeneration     BMI 33.0-33.9,adult     BPH associated with nocturia     Bursitis of left elbow     Chronic fatigue     Chronic systolic heart failure     CKD (chronic kidney disease)     Diabetic retinopathy     Diastolic dysfunction     Dilated cardiomyopathy     Elevated lipids     Frequent falls     Insomnia, unspecified     Long term (current) use of insulin     Mild episode of recurrent major depressive disorder     Mixed hyperlipidemia     Nocturia     Other long term (current) drug therapy     Perennial allergic rhinitis     Peripheral polyneuropathy     Primary osteoarthritis involving multiple joints     Rotator cuff tear     Skin cancer     Small vessel coronary artery disease     Type  2 diabetes mellitus with diabetic neuropathy, with long-term current use of insulin     Vitamin D insufficiency      Past Surgical History:   Procedure Laterality Date    CATARACT EXTRACTION      ELBOW OLECRANNON BURSA EXCISION      KNEE ARTHROSCOPY Bilateral     ROTATOR CUFF REPAIR        Family History   Problem Relation Age of Onset    Stroke Mother     Diabetes Mother     Cancer Mother     Diabetes Maternal Grandmother     Heart disease Maternal Grandmother       Social History     Socioeconomic History    Marital status:    Tobacco Use    Smoking status: Never    Smokeless tobacco: Never   Vaping Use    Vaping status: Never Used   Substance and Sexual Activity    Alcohol use: Yes    Drug use: Never    Sexual activity: Defer      Allergies   Allergen Reactions    Lortab [Hydrocodone-Acetaminophen] Nausea Only      Current Outpatient Medications on File Prior to Visit   Medication Sig Dispense Refill    Alpha-Lipoic Acid 600 MG tablet Take  by mouth. Indications: Muscle Cramps      atorvastatin (LIPITOR) 20 MG tablet Take 1 tablet by mouth Daily. 90 tablet 3    bumetanide (BUMEX) 2 MG tablet Take 1 tablet by mouth Daily. Prn      carvedilol (COREG) 6.25 MG tablet Take 1 tablet by mouth 2 (Two) Times a Day With Meals. Indications: High Blood Pressure 180 tablet 3    cetirizine (zyrTEC) 10 MG tablet Take 1 tablet by mouth Daily. Indications: Hayfever      Cinnamon 500 MG capsule Take  by mouth.      clopidogrel (PLAVIX) 75 MG tablet Take 1 tablet by mouth Daily.      coenzyme Q10 100 MG capsule Take  by mouth.      Continuous Glucose Sensor (FreeStyle Diana 3 Sensor) misc USE AS DIRECTED CHANGE EVERY 14 DAYS 6 each 3    dapagliflozin Propanediol (Farxiga) 10 MG tablet Take 10 mg by mouth Daily. Indications: Type 2 Diabetes 90 tablet 1    eszopiclone (LUNESTA) 2 MG tablet Take 1 tablet by mouth Every Night. Take immediately before bedtime 30 tablet 5    ferrous gluconate (FERGON) 324 MG tablet 1 tablet every  other day with vitamin C.  90-day supply 45 tablet 3    finasteride (PROSCAR) 5 MG tablet Take 1 tablet by mouth Daily. 90 tablet 3    gabapentin (NEURONTIN) 100 MG capsule Take 1-2 po bid for neuropathy pain, and take 1 - 2 po qhs along with the 300mg pills as directed  Indications: Diabetes with Nerve Disease 360 capsule 1    gabapentin (NEURONTIN) 300 MG capsule Take 1-2 po qhs as needed for neuropathy  Indications: Diabetes with Nerve Disease 180 capsule 1    glucagon (GLUCAGEN) 1 MG injection Inject 1 mg under the skin into the appropriate area as directed See Admin Instructions for 365 doses. Follow package directions for low blood sugar. 1 kit 0    Insulin Glargine (Lantus SoloStar) 100 UNIT/ML injection pen Inject 28 Units under the skin into the appropriate area as directed Every Night. Titrate 2 units every 3 days as needed if fasting blood sugar continues more than 140. Max daily dose 50 units.      Insulin Lispro, 1 Unit Dial, (HumaLOG KwikPen) 100 UNIT/ML solution pen-injector Inject 2 Units under the skin into the appropriate area as directed 3 (Three) Times a Day Before Meals. Add 1 unit per 50 mg/dl over 150. Max daily dose 30 units      Insulin Pen Needle (Droplet Pen Needles) 31G X 8 MM misc Inject 1 each under the skin into the appropriate area as directed 2 (Two) Times a Day. 200 each 3    lidocaine (LIDODERM) 5 % Place 1 patch on the skin as directed by provider Daily. Remove & Discard patch within 12 hours or as directed by MD. Must leave off for 12 hours before applying new patch 30 patch 11    Mirabegron ER (MYRBETRIQ) 50 MG tablet sustained-release 24 hour 24 hr tablet Myrbetriq 50 mg tablet,extended release   Take 1 tablet every day by oral route in the morning.      montelukast (SINGULAIR) 10 MG tablet Take 1 tablet by mouth Every Evening. 90 tablet 3    multivitamin with minerals tablet tablet Take 1 tablet by mouth Daily. WITH FOOD      pantoprazole (PROTONIX) 40 MG EC tablet Take 1  "tablet by mouth Daily. 90 tablet 3    spironolactone (ALDACTONE) 25 MG tablet Take 1 tablet by mouth Daily. 90 tablet 1    traMADol (ULTRAM) 50 MG tablet Take 1 tablet by mouth Every 6 (Six) Hours As Needed for Moderate Pain.      venlafaxine XR (Effexor XR) 75 MG 24 hr capsule Take 1 capsule by mouth Daily. For depression and for neuropathy pain 90 capsule 3     No current facility-administered medications on file prior to visit.        Review of Systems   Constitutional:  Negative for activity change, appetite change, unexpected weight gain and unexpected weight loss.   Eyes:  Positive for visual disturbance.   Respiratory:  Positive for shortness of breath.    Cardiovascular:  Negative for chest pain.   Gastrointestinal:  Positive for constipation and GERD. Negative for abdominal pain and diarrhea.   Endocrine: Negative for polydipsia and polyuria.   Skin:  Negative for rash and skin lesions.   Neurological:  Positive for numbness. Negative for dizziness.        /76 (BP Location: Right arm, Patient Position: Sitting, Cuff Size: Adult)   Pulse 76   Ht 177.8 cm (70\")   Wt 92.5 kg (204 lb)   SpO2 96%   BMI 29.27 kg/m²      Physical Exam  Constitutional:       Appearance: Normal appearance.   Cardiovascular:      Rate and Rhythm: Normal rate.   Pulmonary:      Effort: Pulmonary effort is normal.   Musculoskeletal:         General: Normal range of motion.   Feet:      Comments: Left great toe with stage 2 ulceration approximately 0.5 cm without discharge, eschar, expanding erythema.   Skin:     General: Skin is warm and dry.   Neurological:      General: No focal deficit present.      Mental Status: He is alert and oriented to person, place, and time.   Psychiatric:         Mood and Affect: Mood normal.         Behavior: Behavior normal.         LABS AND IMAGING  CMP:  Lab Results   Component Value Date    Glucose 161 (H) 05/06/2024    BUN 29 (H) 05/06/2024    BUN/Creatinine Ratio 29 (H) 05/06/2024    " "Creatinine 1.01 05/06/2024    EGFR Result 77 05/06/2024    Total CO2 27 05/06/2024    Calcium 9.3 05/06/2024    Albumin 4.4 05/06/2024    AST (SGOT) 22 05/06/2024    ALT (SGPT) 14 05/06/2024     Lipid Panel:  Lab Results   Component Value Date    TRIG 93 09/18/2023    HDL 36 (L) 09/18/2023    VLDL 18 09/18/2023    LDL 25 09/18/2023     HbA1c:  Hemoglobin A1C   Date Value Ref Range Status   02/26/2025 5.7 4.5 - 5.7 % Final     Glucose:  No results found for: \"POCGLU\"  Microalbumin:  No results found for: \"MALBCRERATIO\"  TSH:  Lab Results   Component Value Date    TSH 2.280 05/06/2024       Assessment and Plan    Diagnoses and all orders for this visit:    1. Type 2 diabetes mellitus with hypoglycemia without coma, with long-term current use of insulin (Primary)  Assessment & Plan:  Diabetes is not controlled; hypoglycemia improving.  A1C 5.7% 2/26/2025;    CGM Interpretation: Target range mostly overnight with postprandial hyperglycemia during day.  Delayed down trending occasionally overnight without hypoglycemia.    Advised bolus 15-30 minutes before meal to prevent postprandial hyperglycemia.   Rx: Continue lantus 40 units nightly - titrate 2 units every 3 days as needed if fasting BG >140. Continue Humalog 4 units before meals with CF 1:50 mg/dl over 150. Continue farxiga. Continue CGM.   Cautioned risk for hypoglycemia; glucose supplement/glucagon as needed.     Foot exam 2/2025 with neuropathy; continued ulceration left great toe. today  Microalbumin due; unable to get in office today.  Eye exam utd, continue following with opthalmology for diabetic eye disease.  LDL unknown last; continue statin  BP at goal <130/80; continue current regimen.       2. Diabetic ulcer of toe of left foot associated with type 2 diabetes mellitus, with fat layer exposed  Assessment & Plan:  Callus removed exposing stage 2-3 ulceration.  Cautioned risk for infection.   Referred to wound clinic after last visit; encouraged continued " follow-up for monitoring and management.  Continue BEN for evaluation PAD.      3. Frequent falls  Assessment & Plan:  Likely neuropathy contributor; continue management per PCP.   Consider SE medications, PAD.  Encouraged continued effort toward BG control.           Return in about 8 weeks (around 6/4/2025) for follow-up diabetes. The patient was instructed to contact the clinic with any interval questions or concerns.    Electronically signed by: Arabella Eddy PA-C   Endocrinology     Please note that portions of this note were completed with a voice recognition program.

## 2025-04-09 NOTE — ASSESSMENT & PLAN NOTE
Diabetes is not controlled; hypoglycemia improving.  A1C 5.7% 2/26/2025;    CGM Interpretation: Target range mostly overnight with postprandial hyperglycemia during day.  Delayed down trending occasionally overnight without hypoglycemia.    Advised bolus 15-30 minutes before meal to prevent postprandial hyperglycemia.   Rx: Continue lantus 40 units nightly - titrate 2 units every 3 days as needed if fasting BG >140. Continue Humalog 4 units before meals with CF 1:50 mg/dl over 150. Continue farxiga. Continue CGM.   Cautioned risk for hypoglycemia; glucose supplement/glucagon as needed.     Foot exam 2/2025 with neuropathy; continued ulceration left great toe. today  Microalbumin due; unable to get in office today.  Eye exam utd, continue following with opthalmology for diabetic eye disease.  LDL unknown last; continue statin  BP at goal <130/80; continue current regimen.

## 2025-04-10 NOTE — ASSESSMENT & PLAN NOTE
Likely neuropathy contributor; continue management per PCP.   Consider SE medications, PAD.  Encouraged continued effort toward BG control.

## 2025-04-10 NOTE — ASSESSMENT & PLAN NOTE
Callus removed exposing stage 2-3 ulceration.  Cautioned risk for infection.   Referred to wound clinic after last visit; encouraged continued follow-up for monitoring and management.  Continue BEN for evaluation PAD.

## 2025-04-30 RX ORDER — SPIRONOLACTONE 25 MG/1
25 TABLET ORAL DAILY
Qty: 90 TABLET | Refills: 1 | Status: SHIPPED | OUTPATIENT
Start: 2025-04-30

## 2025-04-30 RX ORDER — DAPAGLIFLOZIN 10 MG/1
10 TABLET, FILM COATED ORAL DAILY
Qty: 90 TABLET | Refills: 1 | Status: SHIPPED | OUTPATIENT
Start: 2025-04-30

## 2025-05-26 DIAGNOSIS — F51.04 CHRONIC INSOMNIA: ICD-10-CM

## 2025-05-27 RX ORDER — ESZOPICLONE 2 MG/1
2 TABLET, FILM COATED ORAL
Qty: 30 TABLET | Refills: 2 | Status: SHIPPED | OUTPATIENT
Start: 2025-05-27

## 2025-05-31 ENCOUNTER — TELEPHONE (OUTPATIENT)
Dept: FAMILY MEDICINE CLINIC | Facility: CLINIC | Age: 80
End: 2025-05-31
Payer: MEDICARE

## 2025-05-31 NOTE — TELEPHONE ENCOUNTER
Incoming Refill Request      Medication requested (name and dose):   LIDOCAINE 5%    Pharmacy where request should be sent:   [facility]    Additional details provided by patient:   NONE    Best call back number:   [phone]    Does the patient have less than a 3 day supply:  [] Yes  [x] No    Vielka Lugo Rep  05/31/25, 10:23 EDT

## 2025-06-02 RX ORDER — LIDOCAINE 50 MG/G
1 OINTMENT TOPICAL
Qty: 240 G | Refills: 11 | Status: SHIPPED | OUTPATIENT
Start: 2025-06-02 | End: 2025-06-04 | Stop reason: SDUPTHER

## 2025-06-02 RX ORDER — LIDOCAINE 50 MG/G
1 OINTMENT TOPICAL
Qty: 240 G | Refills: 11 | Status: SHIPPED | OUTPATIENT
Start: 2025-06-02 | End: 2025-06-02 | Stop reason: SDUPTHER

## 2025-06-02 NOTE — TELEPHONE ENCOUNTER
Spoke to Winter patients wife and she states he needs the topical ointment refilled. Not the patches

## 2025-06-02 NOTE — TELEPHONE ENCOUNTER
Spoke to wife and she states its fine they can wait until the system is back up. They still have some left in the tube they have.

## 2025-06-02 NOTE — TELEPHONE ENCOUNTER
[facility] and their computer system is currently [place].     Will have to call back tomorrow.

## 2025-06-04 ENCOUNTER — TELEPHONE (OUTPATIENT)
Dept: FAMILY MEDICINE CLINIC | Facility: CLINIC | Age: 80
End: 2025-06-04
Payer: MEDICARE

## 2025-06-04 RX ORDER — LIDOCAINE 50 MG/G
1 OINTMENT TOPICAL
Qty: 240 G | Refills: 11 | Status: SHIPPED | OUTPATIENT
Start: 2025-06-04

## 2025-06-11 ENCOUNTER — OFFICE VISIT (OUTPATIENT)
Dept: ENDOCRINOLOGY | Facility: CLINIC | Age: 80
End: 2025-06-11
Payer: MEDICARE

## 2025-06-11 VITALS
WEIGHT: 202 LBS | BODY MASS INDEX: 28.92 KG/M2 | HEIGHT: 70 IN | OXYGEN SATURATION: 96 % | DIASTOLIC BLOOD PRESSURE: 60 MMHG | HEART RATE: 64 BPM | SYSTOLIC BLOOD PRESSURE: 102 MMHG

## 2025-06-11 DIAGNOSIS — E11.65 TYPE 2 DIABETES MELLITUS WITH HYPERGLYCEMIA, WITH LONG-TERM CURRENT USE OF INSULIN: Primary | ICD-10-CM

## 2025-06-11 DIAGNOSIS — E11.649 TYPE 2 DIABETES MELLITUS WITH HYPOGLYCEMIA WITHOUT COMA, WITH LONG-TERM CURRENT USE OF INSULIN: ICD-10-CM

## 2025-06-11 DIAGNOSIS — Z79.4 TYPE 2 DIABETES MELLITUS WITH HYPERGLYCEMIA, WITH LONG-TERM CURRENT USE OF INSULIN: Primary | ICD-10-CM

## 2025-06-11 DIAGNOSIS — Z79.4 TYPE 2 DIABETES MELLITUS WITH HYPOGLYCEMIA WITHOUT COMA, WITH LONG-TERM CURRENT USE OF INSULIN: ICD-10-CM

## 2025-06-11 LAB
EXPIRATION DATE: ABNORMAL
EXPIRATION DATE: NORMAL
GLUCOSE BLDC GLUCOMTR-MCNC: 126 MG/DL (ref 70–130)
HBA1C MFR BLD: 6.3 % (ref 4.5–5.7)
Lab: ABNORMAL
Lab: NORMAL

## 2025-06-11 RX ORDER — PEN NEEDLE, DIABETIC 32GX 5/32"
1 NEEDLE, DISPOSABLE MISCELLANEOUS 3 TIMES DAILY
Qty: 200 EACH | Refills: 3 | Status: SHIPPED | OUTPATIENT
Start: 2025-06-11

## 2025-06-11 RX ORDER — INSULIN LISPRO 100 [IU]/ML
5 INJECTION, SOLUTION INTRAVENOUS; SUBCUTANEOUS
Start: 2025-06-11

## 2025-06-11 RX ORDER — INSULIN GLARGINE 100 [IU]/ML
40 INJECTION, SOLUTION SUBCUTANEOUS NIGHTLY
Start: 2025-06-11

## 2025-06-11 NOTE — ASSESSMENT & PLAN NOTE
Diabetes is reasonably controlled with hyperglycemia. Hypoglycemia improved.  A1C 6.3% today; sensor data reading higher at 7.4%.  Discussed A1c likely higher more consistent with sensor data given history of anemia.  Advised additional fructosamine with future lab.  CGM Interpretation: Target range mostly overnight with postprandial hyperglycemia during day.  Delayed down trending occasionally overnight without hypoglycemia.     Advised bolus 15-30 minutes before meal to prevent postprandial hyperglycemia.   Rx: Increase Humalog 5 units before lunch/6 before dinner with CF 1:50 mg/dl over 150. Continue lantus 40 units nightly. Continue farxiga. Continue CGM.   Cautioned risk for hypoglycemia; glucose supplement/glucagon as needed.     Foot exam 2/2025 with neuropathy; Left foot with small preulcerative callus to great toe <0.5 cm.  Improving from prior visit.  Microalbumin due; unable to get in office today; orders provided   Eye exam due 11/2025, continue following with opthalmology for diabetic eye disease.  LDL unknown last; continue statin per cardiology   BP at goal <130/80; continue current regimen.

## 2025-06-11 NOTE — PATIENT INSTRUCTIONS
Diabetes Treatment Recommendations  06/11/25     Joe Fung 1945     Your A1C is:   Lab Results   Component Value Date    HGBA1C 6.3 (A) 06/11/2025    HGBA1C 5.7 02/26/2025    HGBA1C 5.4 04/03/2024       ADA General Goals: A1c: < 7%                                                  Fasting/before meal glucose: <150 mg/dL                                    2 Hour after meal glucoses: < 180 mg/dL                                        Bedtime glucose:120-180                                                                Glucose testing frequency: daily before insulin use    Medication Changes:   - Continue farxiga 10 mg     Insulin dosing:  Basal insulin (Long acting) Lantus  40 units at bedtime             Mealtime/Bolus Insulin (Short acting) Humalog (U-100) 5 units before lunch/6 units before dinner.   Add additional correction insulin if blood sugar before meal is more than 150:   If skipping meal/4 hours since last injection and blood sugar is above 150 use correction insulin only:    Correction insulin (add to meal insulin)   0 unit  if glucose less than 150   1unit   if glucose  150 - 199   2 units if glucose 200 - 249   3 units if glucose 250 - 299   4 units if glucose 300 - 349   5  units if glucose Greater than 350        Keep records of your glucose levels and insulin adjustments.   We may ask you to keep records on the content of your meals with insulin doses and before/after meal glucose levels to evaluate your ratios.    Call for advice if you have unexplained or unexpected hypoglycemia  (glucose < 70) or persistent high glucose > 250.    Office: [phone]    Arabella Eddy PA-C

## 2025-06-11 NOTE — PROGRESS NOTES
Chief Complaint   Patient presents with    Diabetes        HPI  Joe Fung is a 80 y.o. male presents today for follow-up evaluation of diabetes. They were last seen for this 4/2025.     Hemoglobin A1C   Date Value Ref Range Status   06/11/2025 6.3 (A) 4.5 - 5.7 % Final   02/26/2025 5.7 4.5 - 5.7 % Final     Accompanied by wife at visit today who assists with care at home.      Lantus 40 units  Humalog 4-6 units; taking about twice daily right at meal.   Reports hyperglycemia in evenings; has limited snacks since last visit      Debridement for sore to left great toe through wound clinic; healing well until dragged foot and reopened 4/2025. Improved over time.   Has had hx of ulcerations and previously following with wound clinic at [facility].      Hx frequent falls.      - Vision loss with injections in past   - Chronic numbness/sensation loss. Admits sore to toe; healing.   - Admits constipation, BM 2x/week, occasional loose stools  - Admits intermittent heartburn taking PPI  - Admits intermittent SOB, swelling.     - Denies abdominal pain.  - Denies increased thirst or urination.   - Denies burning with urination. Hx leakage improving with Botox and Myrbetriq    - Denies chest pain.     Following with cardiology.      Medical history: DM, HTN, CAD, CHF, anemia, hearing loss, skin cancer s/p excision      Type 2 Diabetes:   Diagnosed with diabetes: 2005   Complications: CAD, hx foot ulcer     Current regimen includes: basal-bolus insulin, farxiga 10 mg   Has tried: metformin (stopped due to CHF)     Compliance with medications is good.  - HTN: carvedilol, Bumetanide, spironolactone  - HLD: atorvastatin 20 mg   - Neuropathy: gabapentin   - Aspirin: 81 mg, Plavix      CGM Download  -Dates reviewed: 5/29/2025 to 6/11/2025  -Data: 63% target range, 30% high, 7% very high, 0% low, 0% very low  -CGM Interpretation: Target range in early morning hours with postprandial hyperglycemia during day with delayed  return to target range     DM Health Maintenance:  Ophthalmology: 11/2024; hx retinopathy; follows q4m   Monofilament / Foot exam: 4/2025; ulceration left great toe  Urine microalbumin: cr: unable to perform at visit today; orders provided   GFR: 60s 2/6/2025  LDL: unknown last; managed by cardiology      Social Hx:  Diet: Meals: 2x/day Breakfast: occasional granola/yogurt if low Lunch/Dinner: meat + vegetable Snacks/Dessert: popcorn or chex or fruit Drinks: water, occasional diet soda, coconut water (diet)   Exercise: minimal    The following portions of the patient's history were reviewed and updated as appropriate: allergies, current medications, past family history, past medical history, past social history, past surgical history and problem list.    Past Medical History:   Diagnosis Date    Adenomatous polyp of colon     Bilateral exudative age-related macular degeneration     BMI 33.0-33.9,adult     BPH associated with nocturia     Bursitis of left elbow     Chronic fatigue     Chronic systolic heart failure     CKD (chronic kidney disease)     Diabetic retinopathy     Diastolic dysfunction     Dilated cardiomyopathy     Elevated lipids     Frequent falls     Insomnia, unspecified     Long term (current) use of insulin     Mild episode of recurrent major depressive disorder     Mixed hyperlipidemia     Nocturia     Other long term (current) drug therapy     Perennial allergic rhinitis     Peripheral polyneuropathy     Primary osteoarthritis involving multiple joints     Rotator cuff tear     Skin cancer     Small vessel coronary artery disease     Type 2 diabetes mellitus with diabetic neuropathy, with long-term current use of insulin     Vitamin D insufficiency      Past Surgical History:   Procedure Laterality Date    CATARACT EXTRACTION      ELBOW OLECRANNON BURSA EXCISION      KNEE ARTHROSCOPY Bilateral     ROTATOR CUFF REPAIR        Family History   Problem Relation Age of Onset    Stroke Mother      Diabetes Mother     Cancer Mother     Diabetes Maternal Grandmother     Heart disease Maternal Grandmother       Social History     Socioeconomic History    Marital status:    Tobacco Use    Smoking status: Never    Smokeless tobacco: Never   Vaping Use    Vaping status: Never Used   Substance and Sexual Activity    Alcohol use: Yes    Drug use: Never    Sexual activity: Defer      Allergies   Allergen Reactions    Lortab [Hydrocodone-Acetaminophen] Nausea Only      Current Outpatient Medications on File Prior to Visit   Medication Sig Dispense Refill    Alpha-Lipoic Acid 600 MG tablet Take  by mouth. Indications: Muscle Cramps      atorvastatin (LIPITOR) 20 MG tablet Take 1 tablet by mouth Daily. 90 tablet 3    bumetanide (BUMEX) 2 MG tablet Take 1 tablet by mouth Daily. Prn  Indications: High Blood Pressure      carvedilol (COREG) 6.25 MG tablet Take 1 tablet by mouth 2 (Two) Times a Day With Meals. Indications: High Blood Pressure 180 tablet 3    cetirizine (zyrTEC) 10 MG tablet Take 1 tablet by mouth Daily. Indications: Hayfever      Cinnamon 500 MG capsule Take  by mouth.      clopidogrel (PLAVIX) 75 MG tablet Take 1 tablet by mouth Daily.      coenzyme Q10 100 MG capsule Take  by mouth.      Continuous Glucose Sensor (FreeStyle Diana 3 Sensor) misc USE AS DIRECTED CHANGE EVERY 14 DAYS 6 each 3    dapagliflozin Propanediol (Farxiga) 10 MG tablet Take 10 mg by mouth Daily. Indications: Type 2 Diabetes 90 tablet 1    eszopiclone (LUNESTA) 2 MG tablet TAKE ONE TABLET BY MOUTH ONCE NIGHTLY IMMEDIATELY BEFORE BEDTIME 30 tablet 2    ferrous gluconate (FERGON) 324 MG tablet 1 tablet every other day with vitamin C.  90-day supply 45 tablet 3    finasteride (PROSCAR) 5 MG tablet Take 1 tablet by mouth Daily. 90 tablet 3    gabapentin (NEURONTIN) 100 MG capsule Take 1-2 po bid for neuropathy pain, and take 1 - 2 po qhs along with the 300mg pills as directed  Indications: Diabetes with Nerve Disease 360 capsule 1     gabapentin (NEURONTIN) 300 MG capsule Take 1-2 po qhs as needed for neuropathy  Indications: Diabetes with Nerve Disease 180 capsule 1    glucagon (GLUCAGEN) 1 MG injection Inject 1 mg under the skin into the appropriate area as directed See Admin Instructions for 365 doses. Follow package directions for low blood sugar. 1 kit 0    lidocaine (LIDODERM) 5 % Place 1 patch on the skin as directed by provider Daily. Remove & Discard patch within 12 hours or as directed by MD. Must leave off for 12 hours before applying new patch 30 patch 11    lidocaine (XYLOCAINE) 5 % ointment Apply 1 Application topically to the appropriate area as directed Every 2 (Two) Hours As Needed for Moderate Pain. 240 g 11    Mirabegron ER (MYRBETRIQ) 50 MG tablet sustained-release 24 hour 24 hr tablet Myrbetriq 50 mg tablet,extended release   Take 1 tablet every day by oral route in the morning.      montelukast (SINGULAIR) 10 MG tablet Take 1 tablet by mouth Every Evening. 90 tablet 3    multivitamin with minerals tablet tablet Take 1 tablet by mouth Daily. WITH FOOD      pantoprazole (PROTONIX) 40 MG EC tablet Take 1 tablet by mouth Daily. 90 tablet 3    spironolactone (ALDACTONE) 25 MG tablet TAKE ONE TABLET BY MOUTH EVERY DAY 90 tablet 1    traMADol (ULTRAM) 50 MG tablet Take 1 tablet by mouth Every 6 (Six) Hours As Needed for Moderate Pain.      venlafaxine XR (Effexor XR) 75 MG 24 hr capsule Take 1 capsule by mouth Daily. For depression and for neuropathy pain 90 capsule 3    [DISCONTINUED] Insulin Glargine (Lantus SoloStar) 100 UNIT/ML injection pen Inject 28 Units under the skin into the appropriate area as directed Every Night. Titrate 2 units every 3 days as needed if fasting blood sugar continues more than 140. Max daily dose 50 units.      [DISCONTINUED] Insulin Lispro, 1 Unit Dial, (HumaLOG KwikPen) 100 UNIT/ML solution pen-injector Inject 2 Units under the skin into the appropriate area as directed 3 (Three) Times a Day  "Before Meals. Add 1 unit per 50 mg/dl over 150. Max daily dose 30 units      [DISCONTINUED] Insulin Pen Needle (Droplet Pen Needles) 31G X 8 MM misc Inject 1 each under the skin into the appropriate area as directed 2 (Two) Times a Day. 200 each 3     No current facility-administered medications on file prior to visit.        Review of Systems   Constitutional:  Negative for activity change, appetite change, unexpected weight gain and unexpected weight loss.   Eyes:  Negative for visual disturbance.   Respiratory:  Negative for shortness of breath.    Cardiovascular:  Negative for chest pain.   Gastrointestinal:  Negative for abdominal pain, constipation and diarrhea.   Endocrine: Negative for polydipsia and polyuria.   Musculoskeletal:  Positive for gait problem.   Skin:  Positive for wound.   Neurological:  Positive for numbness. Negative for dizziness.        /60 (BP Location: Right arm, Patient Position: Sitting, Cuff Size: Adult)   Pulse 64   Ht 177.8 cm (70\")   Wt 91.6 kg (202 lb)   SpO2 96%   BMI 28.98 kg/m²      Physical Exam  Constitutional:       Appearance: Normal appearance.   Cardiovascular:      Rate and Rhythm: Normal rate.   Pulmonary:      Effort: Pulmonary effort is normal.   Musculoskeletal:         General: Normal range of motion.   Feet:      Comments: Left foot with small preulcerative callus to great toe <0.5 cm.  Improving from prior visit.  Skin:     General: Skin is warm and dry.   Neurological:      General: No focal deficit present.      Mental Status: He is alert and oriented to person, place, and time.   Psychiatric:         Mood and Affect: Mood normal.         Behavior: Behavior normal.         LABS AND IMAGING  CMP:  Lab Results   Component Value Date    Glucose 126 06/11/2025    BUN 29 (H) 05/06/2024    BUN/Creatinine Ratio 29 (H) 05/06/2024    Creatinine 1.01 05/06/2024    EGFR Result 77 05/06/2024    Total CO2 27 05/06/2024    Calcium 9.3 05/06/2024    Albumin 4.4 " "05/06/2024    AST (SGOT) 22 05/06/2024    ALT (SGPT) 14 05/06/2024     Lipid Panel:  Lab Results   Component Value Date    TRIG 93 09/18/2023    HDL 36 (L) 09/18/2023    VLDL 18 09/18/2023    LDL 25 09/18/2023     HbA1c:  Hemoglobin A1C   Date Value Ref Range Status   06/11/2025 6.3 (A) 4.5 - 5.7 % Final     Glucose:  Lab Results   Component Value Date    POCGLU 126 06/11/2025     Microalbumin:  No results found for: \"MALBCRERATIO\"  TSH:  Lab Results   Component Value Date    TSH 2.280 05/06/2024       Assessment and Plan    Diagnoses and all orders for this visit:    1. Type 2 diabetes mellitus with hyperglycemia, with long-term current use of insulin (Primary)  Assessment & Plan:  Diabetes is reasonably controlled with hyperglycemia. Hypoglycemia improved.  A1C 6.3% today; sensor data reading higher at 7.4%.  Discussed A1c likely higher more consistent with sensor data given history of anemia.  Advised additional fructosamine with future lab.  CGM Interpretation: Target range mostly overnight with postprandial hyperglycemia during day.  Delayed down trending occasionally overnight without hypoglycemia.     Advised bolus 15-30 minutes before meal to prevent postprandial hyperglycemia.   Rx: Increase Humalog 5 units before lunch/6 before dinner with CF 1:50 mg/dl over 150. Continue lantus 40 units nightly. Continue farxiga. Continue CGM.   Cautioned risk for hypoglycemia; glucose supplement/glucagon as needed.     Foot exam 2/2025 with neuropathy; Left foot with small preulcerative callus to great toe <0.5 cm.  Improving from prior visit.  Microalbumin due; unable to get in office today; orders provided   Eye exam due 11/2025, continue following with opthalmology for diabetic eye disease.  LDL unknown last; continue statin per cardiology   BP at goal <130/80; continue current regimen.     Orders:  -     POC Glycosylated Hemoglobin (Hb A1C)  -     POC Glucose, Blood  -     Microalbumin / Creatinine Urine Ratio - " Urine, Clean Catch; Future  -     Fructosamine; Future    2. Type 2 diabetes mellitus with hypoglycemia without coma, with long-term current use of insulin  -     Insulin Lispro, 1 Unit Dial, (HumaLOG KwikPen) 100 UNIT/ML solution pen-injector; Inject 5 Units under the skin into the appropriate area as directed 3 (Three) Times a Day Before Meals. Add 1 unit per 50 mg/dl over 150. Max daily dose 30 units  -     Insulin Glargine (Lantus SoloStar) 100 UNIT/ML injection pen; Inject 40 Units under the skin into the appropriate area as directed Every Night. Titrate 2 units every 3 days as needed if fasting blood sugar continues more than 140. Max daily dose 50 units.    Other orders  -     Insulin Pen Needle (Droplet Pen Needles) 31G X 8 MM misc; Inject 1 each under the skin into the appropriate area as directed 3 times a day.  Dispense: 200 each; Refill: 3         Return in about 3 months (around 9/11/2025) for follow-up diabetes. The patient was instructed to contact the clinic with any interval questions or concerns.    Electronically signed by: Arabella Eddy PA-C   Endocrinology     Please note that portions of this note were completed with a voice recognition program.

## 2025-07-08 DIAGNOSIS — E11.42 TYPE 2 DIABETES MELLITUS WITH PERIPHERAL NEUROPATHY: ICD-10-CM

## 2025-07-08 RX ORDER — GABAPENTIN 100 MG/1
CAPSULE ORAL
Qty: 360 CAPSULE | Refills: 0 | Status: SHIPPED | OUTPATIENT
Start: 2025-07-08

## 2025-07-08 RX ORDER — GABAPENTIN 300 MG/1
CAPSULE ORAL
Qty: 180 CAPSULE | Refills: 0 | Status: SHIPPED | OUTPATIENT
Start: 2025-07-08

## 2025-07-15 ENCOUNTER — OFFICE VISIT (OUTPATIENT)
Dept: ONCOLOGY | Facility: CLINIC | Age: 80
End: 2025-07-15
Payer: MEDICARE

## 2025-07-15 VITALS
HEART RATE: 63 BPM | TEMPERATURE: 97.7 F | RESPIRATION RATE: 18 BRPM | SYSTOLIC BLOOD PRESSURE: 116 MMHG | DIASTOLIC BLOOD PRESSURE: 59 MMHG | BODY MASS INDEX: 29.35 KG/M2 | HEIGHT: 70 IN | WEIGHT: 205 LBS

## 2025-07-15 DIAGNOSIS — D53.9 MACROCYTIC ANEMIA: Primary | ICD-10-CM

## 2025-07-15 PROCEDURE — 1125F AMNT PAIN NOTED PAIN PRSNT: CPT | Performed by: INTERNAL MEDICINE

## 2025-07-15 PROCEDURE — 99214 OFFICE O/P EST MOD 30 MIN: CPT | Performed by: INTERNAL MEDICINE

## 2025-07-15 PROCEDURE — 3078F DIAST BP <80 MM HG: CPT | Performed by: INTERNAL MEDICINE

## 2025-07-15 PROCEDURE — 3074F SYST BP LT 130 MM HG: CPT | Performed by: INTERNAL MEDICINE

## 2025-07-15 PROCEDURE — 1160F RVW MEDS BY RX/DR IN RCRD: CPT | Performed by: INTERNAL MEDICINE

## 2025-07-15 PROCEDURE — 1159F MED LIST DOCD IN RCRD: CPT | Performed by: INTERNAL MEDICINE

## 2025-07-15 NOTE — PROGRESS NOTES
"CHIEF COMPLAINT: No new somatic complaints    Problem List:  Oncology/Hematology History Overview Note   1.  Macrocytic anemia nonetheless with iron deficiency anemia  2.  History of adenoma  3.  Diastolic dysfunction   4.  Diabetes  5.  Hyperlipidemia  6.  Polyneuropathy  7.  Macular degeneration  8.  Chronic kidney disease  9.  Diarrhea felt to be due to Bumex  10.  Osteoarthritis  11.  Cirrhosis identified on gallbladder ultrasound April 2024  -Seen by MIRANDA, Marlene Zamorano, SATNAM June 2024  -6/13/2024 CT abdomen and pelvis shows hepatic steatosis, cholelithiasis without CT evidence of cholecystitis.  No acute abdominopelvic process.  -6/26/2024 labs from GI mitochondria M2 Ab negative <20, actin smooth muscle IgG  Ab 3, negative.  Total bilirubin 1.6, AST 17, ALT 29.  Hepatitis panel negative  -9/30/2024 CMP total bilirubin 1.7, AST 13, ALT 13  -1/27/2025 ER visit [facility] for shortness of air, CBC WBC 7.0, hemoglobin 9.4, hematocrit 30.6%, .7, platelet count 179,000.  CMP creatinine 1.10, glucose 136, calcium 8.3, AST 64, ALT 43, total bilirubin 2.8.    Hematology history timeline:  -2019 colonoscopy with polyps.  Next 1 due 2024.  -12/22/2020 iron 91.  Ferritin 63.6  -November 2021 hemoglobin 12.4 with normal ferritin, iron, B12, folate Per primary care note.  -2/10/2022 iron 82 ferritin 67.5.  Folic acid greater than 22.5.  Hemoglobin 11.9 with MCV 97 and normal white count platelet count and differential.  - 3/16/2022 EGD duodenal biopsy x2, stomach antrum biopsy x2 unremarkable.  Descending colon polyp on colonoscopy as well as sigmoid polyp both tubular adenomas without dysplasia    -5/31/2022 [facility] initial consultation: Here because of his \"anemia\".  He is barely anemic with normochromic normocytic indices which would make iron deficiency or folate deficiency or B12 deficiency unlikely and none of the serologies suggest this.  His wife and he are both convinced he has had an EGD and " a colonoscopy done within the last year with Dr. Jerez and I will try to get those results but in the meantime before putting him through an M panel, hemolytic work-up, megaloblastic work-up, and certainly before do anything more aggressive like a bone marrow biopsy to look for myelodysplasia, I will simply repeat his blood work now and again in 4 to 5 months to see what the trend is.  If it is normalizing or stable he is not sure how much work-up he did want to do.  The odds of myelodysplasia or some more sinister marrow disorder with normochromic normocytic indices is unlikely.  With a combination of mild chronic kidney disease and polyneuropathy, if his anemia worsens we will also check the M panel for possible monoclonal gammopathy as a hint towards plasma cell dyscrasia.    -5/31/2022 hemoglobin 11.5 With MCV 96 otherwise unremarkable CBC and differential.  -6/23/2022 hemoglobin 13 MCV 97.  Ferritin 87 with iron 73, B12 greater than 2000, and folate greater than 20  - 10/6/2022 hemoglobin 12.8 with MCV 99.2 otherwise unremarkable CBC    -10/11/2022 [facility] hematology follow-up: Over the last 6 months, he essentially has not been anemic.  He is mildly macrocytic but without B12 or folate deficiency.  No further hematologic evaluation at this junction unless he develops significant anemia I.  E.  Refer him back if he needs less than a hemoglobin of 10 but otherwise no further hematology work-up.  I will see as needed.     -1/27/2025 ER visit with c/o SOA and weakness.  Hgb 9.4, Hct 30.6, .7, Plt 179,000.  Total Bili 2.8, AST 64, ALT 43, Alk Phos 77. NT-Pro-BNP 3057.  Chest x-ray shows similar cardiomegaly without appreciable edema, no other acute findings.    -2/4/2025 abdominal ultrasound shows increased echogenicity of the liver suggestive of steatosis.  No suspicious focal liver lesions detected.  Portal vein shows antegrade flow within the main portal vein.  Gallstones noted within the otherwise  unremarkable gallbladder.  Common duct 3 mm.  Visible portions of the pancreas appear unremarkable.  Spleen is normal size measuring 12.5 cm.  No ascites.  -2/6/2025 [facility] hematology clinic follow-up: Since we released Mr. Fung to follow-up in October 2022 at that time his anemia had resolved, more currently he is now presenting with macrocytic anemia in the face of newly diagnosed cirrhosis.  Abdominal ultrasound showed some portal vein anterograde flow and echogenicity of the liver suggestive of steatosis.  He has not had follow-up as recommended with GI, his wife is going to reach out to get an appointment.  His bilirubin has worsened over the last several months, he did not appear overtly jaundiced today.  For further evaluation of his anemia we will get a peripheral smear, check for hemolysis with haptoglobin and LDH, I will also check a megaloblastic panel, I will repeat his CMP, Iron studies, direct Coral, reticulocyte count, erythropoietin level and total and indirect bilirubin, also for reversible causes checking his B12, methylmalonic acid and folate.  Most likely his anemia is due to his cirrhosis and portal hypertension.  He follows with Dr. Corey cardiology, he has not had a follow-up since his ER visit, his BNP was elevated at over 3000.  I have encouraged his wife to get him in sooner than his scheduled follow-up in April.  We will see him back in 3-4 weeks for follow-up to go over these results.    - 2/6/2025 hemoglobin 10.8  RDW 16.6 with normal white count platelet count and differential.  Peripheral smear review by pathology showed macrocytic anemia with mild nonspecific anisocytosis.  No schistocytes.  Normal white count and platelet count and morphologies.   sodium 146 bicarb 34 BUN 28 glucose 129 otherwise unremarkable CMP save for bilirubin 2 direct bilirubin 0.6 indirect bilirubin 1.4 all mildly elevated.  Normal AST and ALT.  Hemolytic panel negative but haptoglobin not as  ordered.   upper limit of normal 227.  Reticulocyte count 3.27% uncorrected.  Rheumatoid factor negative.  TANI negative.  Erythropoietin normal 43.5.  Megaloblastic panel normal but folate not done as ordered B12 greater than 600.  Methylmalonic acid normal 219.  Homocystine normal 17.4.  Iron panel consistent with iron deficiency: Ferritin normal 58.  Iron low 56 lower limit of normal 65 with iron saturation low 14.8% and normal total iron binding capacity 377    -3/11/2025 [facility] follow-up: Though he has macrocytic anemia and I do believe this is related to his underlying liver disease, he actually has iron deficiency anemia which typically is microcytic.  Nonetheless he is not B12 or folate deficient and is not hemolyzing.  They did not draw his folate level and I will check that with his next blood draw with iron panel and folic acid level prior to return in 4 months in the meantime we will get him in with Dr. Ely who is now covering Dr. Jerez's office to look for bleeding sources from varices or malignancy with a EGD and colonoscopy.    -7/11/2025 iron normal 73 ferritin low 22.4 with normal RDW.    - 7/15/2025 [facility] follow-up: Still has not folate as ordered will order that today.  Normal despite low ferritin and I suspect some degree of iron deficiency still.  Saw Dr. Obrien who in his note talked about colonoscopy but patient states that he was told he did not need EGD and colonoscopy.  I do think he needs endoscopy.  He has risk for variceal bleeding with cirrhosis and suspect the bulk of his anemia is still related to his liver disease.  Ultimately he needs to see gastroenterology regarding his portal hypertension and cirrhosis if not ultimately hepatology.  I will get a CBC with his RBC folate today and CMP there is not a gastroenterologist in [place] now who manages complex gastrointestinal/hepatic diseases such as cirrhosis.     Macrocytic anemia       HISTORY OF  "PRESENT ILLNESS:  The patient is a 80 y.o. male, here for follow up on management of anemia with cirrhosis    Past Medical History:   Diagnosis Date    Adenomatous polyp of colon     Bilateral exudative age-related macular degeneration     BMI 33.0-33.9,adult     BPH associated with nocturia     Bursitis of left elbow     Chronic fatigue     Chronic systolic heart failure     CKD (chronic kidney disease)     Diabetic retinopathy     Diastolic dysfunction     Dilated cardiomyopathy     Elevated lipids     Frequent falls     Insomnia, unspecified     Long term (current) use of insulin     Mild episode of recurrent major depressive disorder     Mixed hyperlipidemia     Nocturia     Other long term (current) drug therapy     Perennial allergic rhinitis     Peripheral polyneuropathy     Primary osteoarthritis involving multiple joints     Rotator cuff tear     Skin cancer     Small vessel coronary artery disease     Type 2 diabetes mellitus with diabetic neuropathy, with long-term current use of insulin     Vitamin D insufficiency      Past Surgical History:   Procedure Laterality Date    CATARACT EXTRACTION      ELBOW OLECRANNON BURSA EXCISION      KNEE ARTHROSCOPY Bilateral     ROTATOR CUFF REPAIR         Allergies   Allergen Reactions    Lortab [Hydrocodone-Acetaminophen] Nausea Only       Family History and Social History reviewed and changed as necessary    REVIEW OF SYSTEM:   No new somatic complaints.  Walks with a walker due to chronic knee pain.    PHYSICAL EXAM:  Jaundice icterus or pallor.  No respiratory distress.    Vitals:    07/15/25 1452   BP: 116/59   Pulse: 63   Resp: 18   Temp: 97.7 °F (36.5 °C)   Weight: 93 kg (205 lb)   Height: 177.8 cm (70\")     Vitals:    07/15/25 1452   PainSc: 8    PainLoc: Knee  Comment: left knee          ECOG score: 1           Vitals reviewed.    ECOG: (1) Restricted in Physically Strenuous Activity, Ambulatory & Able to Do Work of Light Nature    Lab Results   Component " Value Date    HGB 10.5 (L) 10/17/2024    HCT 31.9 (L) 10/17/2024    MCV 96 10/17/2024     10/17/2024    WBC 6.9 10/17/2024    NEUTROABS 4.7 10/17/2024    LYMPHSABS 1.5 10/17/2024    MONOSABS 0.5 10/17/2024    EOSABS 0.2 10/17/2024    BASOSABS 0.0 10/17/2024       Lab Results   Component Value Date    GLUCOSE 161 (H) 05/06/2024    BUN 29 (H) 05/06/2024    CREATININE 1.01 05/06/2024     05/06/2024    K 4.4 05/06/2024     05/06/2024    CO2 27 05/06/2024    CALCIUM 9.3 05/06/2024    PROTEINTOT 6.7 05/06/2024    ALBUMIN 4.4 05/06/2024    BILITOT 1.8 (H) 05/06/2024    ALKPHOS 97 05/06/2024    AST 22 05/06/2024    ALT 14 05/06/2024             ASSESSMENT & PLAN:  1.  Macrocytic anemia nonetheless with iron deficiency anemia  2.  History of adenoma  3.  Diastolic dysfunction   4.  Diabetes  5.  Hyperlipidemia  6.  Polyneuropathy  7.  Macular degeneration  8.  Chronic kidney disease  9.  Diarrhea felt to be due to Bumex  10.  Osteoarthritis  11.  Cirrhosis identified on gallbladder ultrasound April 2024  -Seen by MIRANDA, SATNAM Ceballos June 2024  -6/13/2024 CT abdomen and pelvis shows hepatic steatosis, cholelithiasis without CT evidence of cholecystitis.  No acute abdominopelvic process.  -6/26/2024 labs from GI mitochondria M2 Ab negative <20, actin smooth muscle IgG  Ab 3, negative.  Total bilirubin 1.6, AST 17, ALT 29.  Hepatitis panel negative  -9/30/2024 CMP total bilirubin 1.7, AST 13, ALT 13  -1/27/2025 ER visit [facility] for shortness of air, CBC WBC 7.0, hemoglobin 9.4, hematocrit 30.6%, .7, platelet count 179,000.  CMP creatinine 1.10, glucose 136, calcium 8.3, AST 64, ALT 43, total bilirubin 2.8.    Hematology history timeline:  -2019 colonoscopy with polyps.  Next 1 due 2024.  -12/22/2020 iron 91.  Ferritin 63.6  -November 2021 hemoglobin 12.4 with normal ferritin, iron, B12, folate Per primary care note.  -2/10/2022 iron 82 ferritin 67.5.  Folic acid greater than 22.5.   "Hemoglobin 11.9 with MCV 97 and normal white count platelet count and differential.  - 3/16/2022 EGD duodenal biopsy x2, stomach antrum biopsy x2 unremarkable.  Descending colon polyp on colonoscopy as well as sigmoid polyp both tubular adenomas without dysplasia    -5/31/2022 [facility] initial consultation: Here because of his \"anemia\".  He is barely anemic with normochromic normocytic indices which would make iron deficiency or folate deficiency or B12 deficiency unlikely and none of the serologies suggest this.  His wife and he are both convinced he has had an EGD and a colonoscopy done within the last year with Dr. Jerez and I will try to get those results but in the meantime before putting him through an M panel, hemolytic work-up, megaloblastic work-up, and certainly before do anything more aggressive like a bone marrow biopsy to look for myelodysplasia, I will simply repeat his blood work now and again in 4 to 5 months to see what the trend is.  If it is normalizing or stable he is not sure how much work-up he did want to do.  The odds of myelodysplasia or some more sinister marrow disorder with normochromic normocytic indices is unlikely.  With a combination of mild chronic kidney disease and polyneuropathy, if his anemia worsens we will also check the M panel for possible monoclonal gammopathy as a hint towards plasma cell dyscrasia.    -5/31/2022 hemoglobin 11.5 With MCV 96 otherwise unremarkable CBC and differential.  -6/23/2022 hemoglobin 13 MCV 97.  Ferritin 87 with iron 73, B12 greater than 2000, and folate greater than 20  - 10/6/2022 hemoglobin 12.8 with MCV 99.2 otherwise unremarkable CBC    -10/11/2022 [facility] follow-up: Over the last 6 months, he essentially has not been anemic.  He is mildly macrocytic but without B12 or folate deficiency.  No further hematologic evaluation at this junction unless he develops significant anemia I.  E.  Refer him back if he needs less than " a hemoglobin of 10 but otherwise no further hematology work-up.  I will see as needed.     -1/27/2025 ER visit with c/o SOA and weakness.  Hgb 9.4, Hct 30.6, .7, Plt 179,000.  Total Bili 2.8, AST 64, ALT 43, Alk Phos 77. NT-Pro-BNP 3057.  Chest x-ray shows similar cardiomegaly without appreciable edema, no other acute findings.    -2/4/2025 abdominal ultrasound shows increased echogenicity of the liver suggestive of steatosis.  No suspicious focal liver lesions detected.  Portal vein shows antegrade flow within the main portal vein.  Gallstones noted within the otherwise unremarkable gallbladder.  Common duct 3 mm.  Visible portions of the pancreas appear unremarkable.  Spleen is normal size measuring 12.5 cm.  No ascites.  -2/6/2025 [facility] hematology clinic follow-up: Since we released Mr. Fung to follow-up in October 2022 at that time his anemia had resolved, more currently he is now presenting with macrocytic anemia in the face of newly diagnosed cirrhosis.  Abdominal ultrasound showed some portal vein anterograde flow and echogenicity of the liver suggestive of steatosis.  He has not had follow-up as recommended with GI, his wife is going to reach out to get an appointment.  His bilirubin has worsened over the last several months, he did not appear overtly jaundiced today.  For further evaluation of his anemia we will get a peripheral smear, check for hemolysis with haptoglobin and LDH, I will also check a megaloblastic panel, I will repeat his CMP, Iron studies, direct Coral, reticulocyte count, erythropoietin level and total and indirect bilirubin, also for reversible causes checking his B12, methylmalonic acid and folate.  Most likely his anemia is due to his cirrhosis and portal hypertension.  He follows with Dr. Corey cardiology, he has not had a follow-up since his ER visit, his BNP was elevated at over 3000.  I have encouraged his wife to get him in sooner than his scheduled follow-up in April.   We will see him back in 3-4 weeks for follow-up to go over these results.    - 2/6/2025 hemoglobin 10.8  RDW 16.6 with normal white count platelet count and differential.  Peripheral smear review by pathology showed macrocytic anemia with mild nonspecific anisocytosis.  No schistocytes.  Normal white count and platelet count and morphologies.   sodium 146 bicarb 34 BUN 28 glucose 129 otherwise unremarkable CMP save for bilirubin 2 direct bilirubin 0.6 indirect bilirubin 1.4 all mildly elevated.  Normal AST and ALT.  Hemolytic panel negative but haptoglobin not as ordered.   upper limit of normal 227.  Reticulocyte count 3.27% uncorrected.  Rheumatoid factor negative.  TANI negative.  Erythropoietin normal 43.5.  Megaloblastic panel normal but folate not done as ordered B12 greater than 600.  Methylmalonic acid normal 219.  Homocystine normal 17.4.  Iron panel consistent with iron deficiency: Ferritin normal 58.  Iron low 56 lower limit of normal 65 with iron saturation low 14.8% and normal total iron binding capacity 377    -3/11/2025 [facility] follow-up: Though he has macrocytic anemia and I do believe this is related to his underlying liver disease, he actually has iron deficiency anemia which typically is microcytic.  Nonetheless he is not B12 or folate deficient and is not hemolyzing.  They did not draw his folate level and I will check that with his next blood draw with iron panel and folic acid level prior to return in 4 months in the meantime we will get him in with Dr. Ely who is now covering Dr. Jerez's office to look for bleeding sources from varices or malignancy with a EGD and colonoscopy.    -7/11/2025 iron normal 73 ferritin low 22.4 with normal RDW.    - 7/15/2025 [facility] follow-up: Still has not folate as ordered will order that today.  Normal despite low ferritin and I suspect some degree of iron deficiency still.  Saw Dr. Obrien who in his note talked about  colonoscopy but patient states that he was told he did not need EGD and colonoscopy.  I do think he needs endoscopy.  He has risk for variceal bleeding with cirrhosis and suspect the bulk of his anemia is still related to his liver disease.  Ultimately he needs to see gastroenterology regarding his portal hypertension and cirrhosis if not ultimately hepatology.  I will get  his RBC folate today and CMP.  He does not want to get endoscopic procedures of any kind.  Ultimately if he had variceal bleeding from cirrhosis there are things we can do to help but he is not interested in any of those maneuvers so I will not do imaging to look for that as he would not want interventions and his hemoglobin is not terrible.  If his folic acid is low we will supplement but otherwise I will just check a CBC again in about 6 months.    The 7/15/2025 note above was cut pasted from his history overview note but that was from the same day of visit this is not a cut-and-paste from.  It is a separate location in the same record from pasted in the very same day.    Total time of care today inclusive of time spent today prior to patient's arrival reviewing interval records and during visit translating that patient going over his desires with him took 35 minutes patient care time throughout the day today.  Afshin Das MD    07/15/2025

## 2025-07-15 NOTE — LETTER
July 15, 2025     Vimal Irene MD  [address] [facility] [address]    Patient: Joe Fung   YOB: 1945   Date of Visit: 7/15/2025     Dear Vimal Irene MD:       Thank you for referring Joe Fung to me for evaluation. Below are the relevant portions of my assessment and plan of care.    If you have questions, please do not hesitate to call me. I look forward to following Joe along with you.         Sincerely,        Afshin Das MD        CC: MD Cory Alexandra MD Mary Matthews, PA-C Hicks, Lee G, MD  07/15/25 1535  Sign when Signing Visit  CHIEF COMPLAINT: No new somatic complaints    Problem List:  Oncology/Hematology History Overview Note   1.  Macrocytic anemia nonetheless with iron deficiency anemia  2.  History of adenoma  3.  Diastolic dysfunction   4.  Diabetes  5.  Hyperlipidemia  6.  Polyneuropathy  7.  Macular degeneration  8.  Chronic kidney disease  9.  Diarrhea felt to be due to Bumex  10.  Osteoarthritis  11.  Cirrhosis identified on gallbladder ultrasound April 2024  -Seen by Marlene JEAN APRN June 2024  -6/13/2024 CT abdomen and pelvis shows hepatic steatosis, cholelithiasis without CT evidence of cholecystitis.  No acute abdominopelvic process.  -6/26/2024 labs from GI mitochondria M2 Ab negative <20, actin smooth muscle IgG  Ab 3, negative.  Total bilirubin 1.6, AST 17, ALT 29.  Hepatitis panel negative  -9/30/2024 CMP total bilirubin 1.7, AST 13, ALT 13  -1/27/2025 ER visit [facility] for shortness of air, CBC WBC 7.0, hemoglobin 9.4, hematocrit 30.6%, .7, platelet count 179,000.  CMP creatinine 1.10, glucose 136, calcium 8.3, AST 64, ALT 43, total bilirubin 2.8.    Hematology history timeline:  -2019 colonoscopy with polyps.  Next 1 due 2024.  -12/22/2020 iron 91.  Ferritin 63.6  -November 2021 hemoglobin 12.4 with normal ferritin, iron, B12, folate Per primary care note.  -2/10/2022 iron 82 ferritin 67.5.  Folic  "acid greater than 22.5.  Hemoglobin 11.9 with MCV 97 and normal white count platelet count and differential.  - 3/16/2022 EGD duodenal biopsy x2, stomach antrum biopsy x2 unremarkable.  Descending colon polyp on colonoscopy as well as sigmoid polyp both tubular adenomas without dysplasia    -5/31/2022 [facility] initial consultation: Here because of his \"anemia\".  He is barely anemic with normochromic normocytic indices which would make iron deficiency or folate deficiency or B12 deficiency unlikely and none of the serologies suggest this.  His wife and he are both convinced he has had an EGD and a colonoscopy done within the last year with Dr. Jerez and I will try to get those results but in the meantime before putting him through an M panel, hemolytic work-up, megaloblastic work-up, and certainly before do anything more aggressive like a bone marrow biopsy to look for myelodysplasia, I will simply repeat his blood work now and again in 4 to 5 months to see what the trend is.  If it is normalizing or stable he is not sure how much work-up he did want to do.  The odds of myelodysplasia or some more sinister marrow disorder with normochromic normocytic indices is unlikely.  With a combination of mild chronic kidney disease and polyneuropathy, if his anemia worsens we will also check the M panel for possible monoclonal gammopathy as a hint towards plasma cell dyscrasia.    -5/31/2022 hemoglobin 11.5 With MCV 96 otherwise unremarkable CBC and differential.  -6/23/2022 hemoglobin 13 MCV 97.  Ferritin 87 with iron 73, B12 greater than 2000, and folate greater than 20  - 10/6/2022 hemoglobin 12.8 with MCV 99.2 otherwise unremarkable CBC    -10/11/2022 [facility] follow-up: Over the last 6 months, he essentially has not been anemic.  He is mildly macrocytic but without B12 or folate deficiency.  No further hematologic evaluation at this junction unless he develops significant anemia I.  E.  Refer him " back if he needs less than a hemoglobin of 10 but otherwise no further hematology work-up.  I will see as needed.     -1/27/2025 ER visit with c/o SOA and weakness.  Hgb 9.4, Hct 30.6, .7, Plt 179,000.  Total Bili 2.8, AST 64, ALT 43, Alk Phos 77. NT-Pro-BNP 3057.  Chest x-ray shows similar cardiomegaly without appreciable edema, no other acute findings.    -2/4/2025 abdominal ultrasound shows increased echogenicity of the liver suggestive of steatosis.  No suspicious focal liver lesions detected.  Portal vein shows antegrade flow within the main portal vein.  Gallstones noted within the otherwise unremarkable gallbladder.  Common duct 3 mm.  Visible portions of the pancreas appear unremarkable.  Spleen is normal size measuring 12.5 cm.  No ascites.  -2/6/2025 [facility] hematology clinic follow-up: Since we released Mr. Fung to follow-up in October 2022 at that time his anemia had resolved, more currently he is now presenting with macrocytic anemia in the face of newly diagnosed cirrhosis.  Abdominal ultrasound showed some portal vein anterograde flow and echogenicity of the liver suggestive of steatosis.  He has not had follow-up as recommended with GI, his wife is going to reach out to get an appointment.  His bilirubin has worsened over the last several months, he did not appear overtly jaundiced today.  For further evaluation of his anemia we will get a peripheral smear, check for hemolysis with haptoglobin and LDH, I will also check a megaloblastic panel, I will repeat his CMP, Iron studies, direct Coral, reticulocyte count, erythropoietin level and total and indirect bilirubin, also for reversible causes checking his B12, methylmalonic acid and folate.  Most likely his anemia is due to his cirrhosis and portal hypertension.  He follows with Dr. Corey cardiology, he has not had a follow-up since his ER visit, his BNP was elevated at over 3000.  I have encouraged his wife to get him in sooner than his  scheduled follow-up in April.  We will see him back in 3-4 weeks for follow-up to go over these results.    - 2/6/2025 hemoglobin 10.8  RDW 16.6 with normal white count platelet count and differential.  Peripheral smear review by pathology showed macrocytic anemia with mild nonspecific anisocytosis.  No schistocytes.  Normal white count and platelet count and morphologies.   sodium 146 bicarb 34 BUN 28 glucose 129 otherwise unremarkable CMP save for bilirubin 2 direct bilirubin 0.6 indirect bilirubin 1.4 all mildly elevated.  Normal AST and ALT.  Hemolytic panel negative but haptoglobin not as ordered.   upper limit of normal 227.  Reticulocyte count 3.27% uncorrected.  Rheumatoid factor negative.  TANI negative.  Erythropoietin normal 43.5.  Megaloblastic panel normal but folate not done as ordered B12 greater than 600.  Methylmalonic acid normal 219.  Homocystine normal 17.4.  Iron panel consistent with iron deficiency: Ferritin normal 58.  Iron low 56 lower limit of normal 65 with iron saturation low 14.8% and normal total iron binding capacity 377    -3/11/2025 [facility] follow-up: Though he has macrocytic anemia and I do believe this is related to his underlying liver disease, he actually has iron deficiency anemia which typically is microcytic.  Nonetheless he is not B12 or folate deficient and is not hemolyzing.  They did not draw his folate level and I will check that with his next blood draw with iron panel and folic acid level prior to return in 4 months in the meantime we will get him in with Dr. Ely who is now covering Dr. Jerez's office to look for bleeding sources from varices or malignancy with a EGD and colonoscopy.    -7/11/2025 iron normal 73 ferritin low 22.4 with normal RDW.    - 7/15/2025 [facility] follow-up: Still has not folate as ordered will order that today.  Normal despite low ferritin and I suspect some degree of iron deficiency still.  Saw Dr. Obrien  who in his note talked about colonoscopy but patient states that he was told he did not need EGD and colonoscopy.  I do think he needs endoscopy.  He has risk for variceal bleeding with cirrhosis and suspect the bulk of his anemia is still related to his liver disease.  Ultimately he needs to see gastroenterology regarding his portal hypertension and cirrhosis if not ultimately hepatology.  I will get a CBC with his RBC folate today and CMP there is not a gastroenterologist in [place] now who manages complex gastrointestinal/hepatic diseases such as cirrhosis.     Macrocytic anemia       HISTORY OF PRESENT ILLNESS:  The patient is a 80 y.o. male, here for follow up on management of anemia with cirrhosis    Past Medical History:   Diagnosis Date   • Adenomatous polyp of colon    • Bilateral exudative age-related macular degeneration    • BMI 33.0-33.9,adult    • BPH associated with nocturia    • Bursitis of left elbow    • Chronic fatigue    • Chronic systolic heart failure    • CKD (chronic kidney disease)    • Diabetic retinopathy    • Diastolic dysfunction    • Dilated cardiomyopathy    • Elevated lipids    • Frequent falls    • Insomnia, unspecified    • Long term (current) use of insulin    • Mild episode of recurrent major depressive disorder    • Mixed hyperlipidemia    • Nocturia    • Other long term (current) drug therapy    • Perennial allergic rhinitis    • Peripheral polyneuropathy    • Primary osteoarthritis involving multiple joints    • Rotator cuff tear    • Skin cancer    • Small vessel coronary artery disease    • Type 2 diabetes mellitus with diabetic neuropathy, with long-term current use of insulin    • Vitamin D insufficiency      Past Surgical History:   Procedure Laterality Date   • CATARACT EXTRACTION     • ELBOW OLECRANNON BURSA EXCISION     • KNEE ARTHROSCOPY Bilateral    • ROTATOR CUFF REPAIR         Allergies   Allergen Reactions   • Lortab [Hydrocodone-Acetaminophen] Nausea Only  "      Family History and Social History reviewed and changed as necessary    REVIEW OF SYSTEM:   No new somatic complaints.  Walks with a walker due to chronic knee pain.    PHYSICAL EXAM:  Jaundice icterus or pallor.  No respiratory distress.    Vitals:    07/15/25 1452   BP: 116/59   Pulse: 63   Resp: 18   Temp: 97.7 °F (36.5 °C)   Weight: 93 kg (205 lb)   Height: 177.8 cm (70\")     Vitals:    07/15/25 1452   PainSc: 8    PainLoc: Knee  Comment: left knee          ECOG score: 1           Vitals reviewed.    ECOG: (1) Restricted in Physically Strenuous Activity, Ambulatory & Able to Do Work of Light Nature    Lab Results   Component Value Date    HGB 10.5 (L) 10/17/2024    HCT 31.9 (L) 10/17/2024    MCV 96 10/17/2024     10/17/2024    WBC 6.9 10/17/2024    NEUTROABS 4.7 10/17/2024    LYMPHSABS 1.5 10/17/2024    MONOSABS 0.5 10/17/2024    EOSABS 0.2 10/17/2024    BASOSABS 0.0 10/17/2024       Lab Results   Component Value Date    GLUCOSE 161 (H) 05/06/2024    BUN 29 (H) 05/06/2024    CREATININE 1.01 05/06/2024     05/06/2024    K 4.4 05/06/2024     05/06/2024    CO2 27 05/06/2024    CALCIUM 9.3 05/06/2024    PROTEINTOT 6.7 05/06/2024    ALBUMIN 4.4 05/06/2024    BILITOT 1.8 (H) 05/06/2024    ALKPHOS 97 05/06/2024    AST 22 05/06/2024    ALT 14 05/06/2024             ASSESSMENT & PLAN:  1.  Macrocytic anemia nonetheless with iron deficiency anemia  2.  History of adenoma  3.  Diastolic dysfunction   4.  Diabetes  5.  Hyperlipidemia  6.  Polyneuropathy  7.  Macular degeneration  8.  Chronic kidney disease  9.  Diarrhea felt to be due to Bumex  10.  Osteoarthritis  11.  Cirrhosis identified on gallbladder ultrasound April 2024  -Seen by Marlene JEAN APRN June 2024  -6/13/2024 CT abdomen and pelvis shows hepatic steatosis, cholelithiasis without CT evidence of cholecystitis.  No acute abdominopelvic process.  -6/26/2024 labs from GI mitochondria M2 Ab negative <20, actin smooth muscle IgG  Ab 3, " "negative.  Total bilirubin 1.6, AST 17, ALT 29.  Hepatitis panel negative  -9/30/2024 CMP total bilirubin 1.7, AST 13, ALT 13  -1/27/2025 ER visit [facility] for shortness of air, CBC WBC 7.0, hemoglobin 9.4, hematocrit 30.6%, .7, platelet count 179,000.  CMP creatinine 1.10, glucose 136, calcium 8.3, AST 64, ALT 43, total bilirubin 2.8.    Hematology history timeline:  -2019 colonoscopy with polyps.  Next 1 due 2024.  -12/22/2020 iron 91.  Ferritin 63.6  -November 2021 hemoglobin 12.4 with normal ferritin, iron, B12, folate Per primary care note.  -2/10/2022 iron 82 ferritin 67.5.  Folic acid greater than 22.5.  Hemoglobin 11.9 with MCV 97 and normal white count platelet count and differential.  - 3/16/2022 EGD duodenal biopsy x2, stomach antrum biopsy x2 unremarkable.  Descending colon polyp on colonoscopy as well as sigmoid polyp both tubular adenomas without dysplasia    -5/31/2022 [facility] initial consultation: Here because of his \"anemia\".  He is barely anemic with normochromic normocytic indices which would make iron deficiency or folate deficiency or B12 deficiency unlikely and none of the serologies suggest this.  His wife and he are both convinced he has had an EGD and a colonoscopy done within the last year with Dr. Jerez and I will try to get those results but in the meantime before putting him through an M panel, hemolytic work-up, megaloblastic work-up, and certainly before do anything more aggressive like a bone marrow biopsy to look for myelodysplasia, I will simply repeat his blood work now and again in 4 to 5 months to see what the trend is.  If it is normalizing or stable he is not sure how much work-up he did want to do.  The odds of myelodysplasia or some more sinister marrow disorder with normochromic normocytic indices is unlikely.  With a combination of mild chronic kidney disease and polyneuropathy, if his anemia worsens we will also check the M panel for " possible monoclonal gammopathy as a hint towards plasma cell dyscrasia.    -5/31/2022 hemoglobin 11.5 With MCV 96 otherwise unremarkable CBC and differential.  -6/23/2022 hemoglobin 13 MCV 97.  Ferritin 87 with iron 73, B12 greater than 2000, and folate greater than 20  - 10/6/2022 hemoglobin 12.8 with MCV 99.2 otherwise unremarkable CBC    -10/11/2022 [facility] hematology follow-up: Over the last 6 months, he essentially has not been anemic.  He is mildly macrocytic but without B12 or folate deficiency.  No further hematologic evaluation at this junction unless he develops significant anemia I.  E.  Refer him back if he needs less than a hemoglobin of 10 but otherwise no further hematology work-up.  I will see as needed.     -1/27/2025 ER visit with c/o SOA and weakness.  Hgb 9.4, Hct 30.6, .7, Plt 179,000.  Total Bili 2.8, AST 64, ALT 43, Alk Phos 77. NT-Pro-BNP 3057.  Chest x-ray shows similar cardiomegaly without appreciable edema, no other acute findings.    -2/4/2025 abdominal ultrasound shows increased echogenicity of the liver suggestive of steatosis.  No suspicious focal liver lesions detected.  Portal vein shows antegrade flow within the main portal vein.  Gallstones noted within the otherwise unremarkable gallbladder.  Common duct 3 mm.  Visible portions of the pancreas appear unremarkable.  Spleen is normal size measuring 12.5 cm.  No ascites.  -2/6/2025 [facility] hematology clinic follow-up: Since we released Mr. Fung to follow-up in October 2022 at that time his anemia had resolved, more currently he is now presenting with macrocytic anemia in the face of newly diagnosed cirrhosis.  Abdominal ultrasound showed some portal vein anterograde flow and echogenicity of the liver suggestive of steatosis.  He has not had follow-up as recommended with GI, his wife is going to reach out to get an appointment.  His bilirubin has worsened over the last several months, he did not appear overtly jaundiced  today.  For further evaluation of his anemia we will get a peripheral smear, check for hemolysis with haptoglobin and LDH, I will also check a megaloblastic panel, I will repeat his CMP, Iron studies, direct Coral, reticulocyte count, erythropoietin level and total and indirect bilirubin, also for reversible causes checking his B12, methylmalonic acid and folate.  Most likely his anemia is due to his cirrhosis and portal hypertension.  He follows with Dr. Corey cardiology, he has not had a follow-up since his ER visit, his BNP was elevated at over 3000.  I have encouraged his wife to get him in sooner than his scheduled follow-up in April.  We will see him back in 3-4 weeks for follow-up to go over these results.    - 2/6/2025 hemoglobin 10.8  RDW 16.6 with normal white count platelet count and differential.  Peripheral smear review by pathology showed macrocytic anemia with mild nonspecific anisocytosis.  No schistocytes.  Normal white count and platelet count and morphologies.   sodium 146 bicarb 34 BUN 28 glucose 129 otherwise unremarkable CMP save for bilirubin 2 direct bilirubin 0.6 indirect bilirubin 1.4 all mildly elevated.  Normal AST and ALT.  Hemolytic panel negative but haptoglobin not as ordered.   upper limit of normal 227.  Reticulocyte count 3.27% uncorrected.  Rheumatoid factor negative.  TANI negative.  Erythropoietin normal 43.5.  Megaloblastic panel normal but folate not done as ordered B12 greater than 600.  Methylmalonic acid normal 219.  Homocystine normal 17.4.  Iron panel consistent with iron deficiency: Ferritin normal 58.  Iron low 56 lower limit of normal 65 with iron saturation low 14.8% and normal total iron binding capacity 377    -3/11/2025 [facility] hematology follow-up: Though he has macrocytic anemia and I do believe this is related to his underlying liver disease, he actually has iron deficiency anemia which typically is microcytic.  Nonetheless he is not B12 or folate  deficient and is not hemolyzing.  They did not draw his folate level and I will check that with his next blood draw with iron panel and folic acid level prior to return in 4 months in the meantime we will get him in with Dr. Ely who is now covering Dr. Jerez's office to look for bleeding sources from varices or malignancy with a EGD and colonoscopy.    -7/11/2025 iron normal 73 ferritin low 22.4 with normal RDW.    - 7/15/2025 [facility] follow-up: Still has not folate as ordered will order that today.  Normal despite low ferritin and I suspect some degree of iron deficiency still.  Saw Dr. Obrien who in his note talked about colonoscopy but patient states that he was told he did not need EGD and colonoscopy.  I do think he needs endoscopy.  He has risk for variceal bleeding with cirrhosis and suspect the bulk of his anemia is still related to his liver disease.  Ultimately he needs to see gastroenterology regarding his portal hypertension and cirrhosis if not ultimately hepatology.  I will get  his RBC folate today and CMP.  He does not want to get endoscopic procedures of any kind.  Ultimately if he had variceal bleeding from cirrhosis there are things we can do to help but he is not interested in any of those maneuvers so I will not do imaging to look for that as he would not want interventions and his hemoglobin is not terrible.  If his folic acid is low we will supplement but otherwise I will just check a CBC again in about 6 months.    The 7/15/2025 note above was cut pasted from his history overview note but that was from the same day of visit this is not a cut-and-paste from.  It is a separate location in the same record from pasted in the very same day.    Total time of care today inclusive of time spent today prior to patient's arrival reviewing interval records and during visit translating that patient going over his desires with him took 35 minutes patient care time throughout the day  today.  Afshin Das MD    07/15/2025

## 2025-07-16 ENCOUNTER — TELEPHONE (OUTPATIENT)
Dept: ENDOCRINOLOGY | Facility: CLINIC | Age: 80
End: 2025-07-16
Payer: MEDICARE

## 2025-07-16 ENCOUNTER — TELEPHONE (OUTPATIENT)
Dept: ONCOLOGY | Facility: CLINIC | Age: 80
End: 2025-07-16
Payer: MEDICARE

## 2025-07-16 RX ORDER — HYDROCHLOROTHIAZIDE 12.5 MG/1
CAPSULE ORAL
Qty: 6 EACH | Refills: 1 | Status: SHIPPED | OUTPATIENT
Start: 2025-07-16

## 2025-07-16 NOTE — TELEPHONE ENCOUNTER
Pt wife Winter called states they received a letter from [facility] pharmacy freestyle diana 3 sensor will be discontinued. Please send new prescription for Freestyle Diana 3 Plus to [facility]

## 2025-07-16 NOTE — TELEPHONE ENCOUNTER
Caller: KYREE RAMIREZ    Relationship: Emergency Contact    Best call back number: [phone]       Who are you requesting to speak with (clinical staff, provider,  specific staff member): CLINICAL      What was the call regarding: CALLING TO CLARIFY THAT PROVIDER IS NEEDING ANOTHER FOLATE LAB SO SOON AFTER 7/11/25 APPT

## 2025-07-16 NOTE — TELEPHONE ENCOUNTER
Called Winter back and informed her that we did not have the folate level result. She states he had it drawn on 7/11/25 with the rest of his labs at [facility]. Informed her that results would be requested.

## 2025-07-16 NOTE — TELEPHONE ENCOUNTER
Last office visit with prescribing clinician: 6/11/2025       Next office visit with prescribing clinician: 11/26/2025     {

## 2025-08-05 DIAGNOSIS — Z79.4 TYPE 2 DIABETES MELLITUS WITH HYPOGLYCEMIA WITHOUT COMA, WITH LONG-TERM CURRENT USE OF INSULIN: ICD-10-CM

## 2025-08-05 DIAGNOSIS — E11.649 TYPE 2 DIABETES MELLITUS WITH HYPOGLYCEMIA WITHOUT COMA, WITH LONG-TERM CURRENT USE OF INSULIN: ICD-10-CM

## 2025-08-05 RX ORDER — INSULIN GLARGINE 100 [IU]/ML
40 INJECTION, SOLUTION SUBCUTANEOUS NIGHTLY
Start: 2025-08-05

## 2025-08-05 RX ORDER — INSULIN GLARGINE 100 [IU]/ML
INJECTION, SOLUTION SUBCUTANEOUS
Qty: 75 ML | Refills: 3 | OUTPATIENT
Start: 2025-08-05

## 2025-08-18 RX ORDER — CARVEDILOL 6.25 MG/1
6.25 TABLET ORAL 2 TIMES DAILY WITH MEALS
Qty: 180 TABLET | Refills: 0 | Status: SHIPPED | OUTPATIENT
Start: 2025-08-18